# Patient Record
Sex: FEMALE | Race: WHITE | NOT HISPANIC OR LATINO | Employment: FULL TIME | URBAN - METROPOLITAN AREA
[De-identification: names, ages, dates, MRNs, and addresses within clinical notes are randomized per-mention and may not be internally consistent; named-entity substitution may affect disease eponyms.]

---

## 2017-04-19 ENCOUNTER — OFFICE VISIT (OUTPATIENT)
Dept: FAMILY MEDICINE CLINIC | Facility: CLINIC | Age: 32
End: 2017-04-19

## 2017-04-19 VITALS
DIASTOLIC BLOOD PRESSURE: 90 MMHG | OXYGEN SATURATION: 98 % | WEIGHT: 259 LBS | TEMPERATURE: 98.4 F | HEIGHT: 69 IN | HEART RATE: 117 BPM | SYSTOLIC BLOOD PRESSURE: 138 MMHG | BODY MASS INDEX: 38.36 KG/M2

## 2017-04-19 DIAGNOSIS — H81.10 BPPV (BENIGN PAROXYSMAL POSITIONAL VERTIGO), UNSPECIFIED LATERALITY: ICD-10-CM

## 2017-04-19 DIAGNOSIS — J02.9 SORE THROAT: Primary | ICD-10-CM

## 2017-04-19 LAB
EXPIRATION DATE: ABNORMAL
INTERNAL CONTROL: ABNORMAL
Lab: ABNORMAL
S PYO AG THROAT QL: POSITIVE

## 2017-04-19 PROCEDURE — 99213 OFFICE O/P EST LOW 20 MIN: CPT | Performed by: PHYSICIAN ASSISTANT

## 2017-04-19 PROCEDURE — 87880 STREP A ASSAY W/OPTIC: CPT | Performed by: PHYSICIAN ASSISTANT

## 2017-04-19 RX ORDER — MECLIZINE HYDROCHLORIDE 25 MG/1
25 TABLET ORAL 3 TIMES DAILY PRN
Qty: 15 TABLET | Refills: 0 | Status: SHIPPED | OUTPATIENT
Start: 2017-04-19 | End: 2017-10-12

## 2017-04-19 RX ORDER — LISINOPRIL 20 MG/1
1 TABLET ORAL DAILY
COMMUNITY
Start: 2017-01-16 | End: 2018-01-17 | Stop reason: SDUPTHER

## 2017-04-19 RX ORDER — AMOXICILLIN 875 MG/1
875 TABLET, COATED ORAL 2 TIMES DAILY
Qty: 20 TABLET | Refills: 0 | Status: SHIPPED | OUTPATIENT
Start: 2017-04-19 | End: 2017-10-12

## 2017-04-19 NOTE — PROGRESS NOTES
Subjective   Dianna Caicedo is a 32 y.o. female    History of Present Illness    Patient presents today complaining of sore throat since yesterday.  She states that last night she got difficult to swallow because of pain in her throat.  She has felt feverish and achy as well.     The following portions of the patient's history were reviewed and updated as appropriate: allergies, current medications, past social history and problem list    Review of Systems   Constitutional: Positive for fatigue and fever.   HENT: Positive for sore throat, trouble swallowing and voice change.    Respiratory: Negative for apnea and shortness of breath.    Skin: Negative.    Neurological: Positive for dizziness and headaches.       Objective     Vitals:    04/19/17 1111   BP: 138/90   Pulse: 117   Temp: 98.4 °F (36.9 °C)   SpO2: 98%       Physical Exam   Constitutional: She is oriented to person, place, and time. She appears well-developed and well-nourished. No distress.   HENT:   Head: Normocephalic and atraumatic.   Right Ear: Hearing and tympanic membrane normal.   Left Ear: Hearing and tympanic membrane normal.   Mouth/Throat: Posterior oropharyngeal erythema present. No oropharyngeal exudate, posterior oropharyngeal edema or tonsillar abscesses. Tonsils are 1+ on the right. Tonsils are 1+ on the left. No tonsillar exudate.   Eyes: Conjunctivae are normal. Pupils are equal, round, and reactive to light.   Neck: Normal carotid pulses present. Carotid bruit is not present.   Cardiovascular: Normal rate, regular rhythm and normal heart sounds.    Pulmonary/Chest: Effort normal and breath sounds normal. No respiratory distress.   Musculoskeletal: Normal range of motion.   Lymphadenopathy:     She has cervical adenopathy.   Neurological: She is alert and oriented to person, place, and time. She displays normal reflexes. No cranial nerve deficit. She exhibits normal muscle tone. Coordination normal.   Skin: She is not diaphoretic.    Psychiatric: She has a normal mood and affect. Her behavior is normal. Judgment and thought content normal.   Nursing note and vitals reviewed.      Assessment/Plan     Diagnoses and all orders for this visit:    Sore throat  -     POCT rapid strep A  -     amoxicillin (AMOXIL) 875 MG tablet; Take 1 tablet by mouth 2 (Two) Times a Day.    BPPV (benign paroxysmal positional vertigo), unspecified laterality  -     meclizine (ANTIVERT) 25 MG tablet; Take 1 tablet by mouth 3 (Three) Times a Day As Needed for dizziness.    Other orders  -     lisinopril (PRINIVIL,ZESTRIL) 20 MG tablet; Take 1 tablet by mouth Daily.  -     sertraline (ZOLOFT) 50 MG tablet; Take 1 tablet by mouth Daily As Needed.

## 2017-10-12 ENCOUNTER — APPOINTMENT (OUTPATIENT)
Dept: LAB | Facility: HOSPITAL | Age: 32
End: 2017-10-12

## 2017-10-12 ENCOUNTER — OFFICE VISIT (OUTPATIENT)
Dept: FAMILY MEDICINE CLINIC | Facility: CLINIC | Age: 32
End: 2017-10-12

## 2017-10-12 VITALS
RESPIRATION RATE: 14 BRPM | OXYGEN SATURATION: 100 % | TEMPERATURE: 98.4 F | SYSTOLIC BLOOD PRESSURE: 160 MMHG | BODY MASS INDEX: 41.92 KG/M2 | DIASTOLIC BLOOD PRESSURE: 110 MMHG | HEIGHT: 69 IN | WEIGHT: 283 LBS | HEART RATE: 100 BPM

## 2017-10-12 DIAGNOSIS — IMO0001 CLASS 3 OBESITY DUE TO EXCESS CALORIES WITHOUT SERIOUS COMORBIDITY WITH BODY MASS INDEX (BMI) OF 40.0 TO 44.9 IN ADULT: ICD-10-CM

## 2017-10-12 DIAGNOSIS — E03.9 HYPOTHYROIDISM, UNSPECIFIED TYPE: ICD-10-CM

## 2017-10-12 DIAGNOSIS — R60.0 LOCALIZED EDEMA: ICD-10-CM

## 2017-10-12 DIAGNOSIS — I10 ESSENTIAL HYPERTENSION: Primary | ICD-10-CM

## 2017-10-12 LAB
ANION GAP SERPL CALCULATED.3IONS-SCNC: 8 MMOL/L (ref 3–11)
BUN BLD-MCNC: 13 MG/DL (ref 9–23)
BUN/CREAT SERPL: 16.3 (ref 7–25)
CALCIUM SPEC-SCNC: 9.4 MG/DL (ref 8.7–10.4)
CHLORIDE SERPL-SCNC: 101 MMOL/L (ref 99–109)
CO2 SERPL-SCNC: 31 MMOL/L (ref 20–31)
CREAT BLD-MCNC: 0.8 MG/DL (ref 0.6–1.3)
GFR SERPL CREATININE-BSD FRML MDRD: 84 ML/MIN/1.73
GLUCOSE BLD-MCNC: 109 MG/DL (ref 70–100)
POTASSIUM BLD-SCNC: 4 MMOL/L (ref 3.5–5.5)
SODIUM BLD-SCNC: 140 MMOL/L (ref 132–146)
TSH SERPL DL<=0.05 MIU/L-ACNC: 3.32 MIU/ML (ref 0.35–5.35)

## 2017-10-12 PROCEDURE — 84443 ASSAY THYROID STIM HORMONE: CPT | Performed by: PHYSICIAN ASSISTANT

## 2017-10-12 PROCEDURE — 99214 OFFICE O/P EST MOD 30 MIN: CPT | Performed by: PHYSICIAN ASSISTANT

## 2017-10-12 PROCEDURE — 36415 COLL VENOUS BLD VENIPUNCTURE: CPT | Performed by: PHYSICIAN ASSISTANT

## 2017-10-12 PROCEDURE — 80048 BASIC METABOLIC PNL TOTAL CA: CPT | Performed by: PHYSICIAN ASSISTANT

## 2017-10-12 RX ORDER — HYDROCHLOROTHIAZIDE 25 MG/1
25 TABLET ORAL DAILY
Qty: 30 TABLET | Refills: 1 | Status: SHIPPED | OUTPATIENT
Start: 2017-10-12 | End: 2017-11-07 | Stop reason: SDUPTHER

## 2017-10-12 NOTE — PROGRESS NOTES
"Subjective   Dianna Caicedo is a 32 y.o. female    History of Present Illness  Patient comes in today concerned with swelling in her legs and ankles over the past 2 weeks.  Her weight has increased by about 25 pounds since the spring and we last saw her.  She denies any shortness of breath chest pain or headache.  Blood pressure is notably elevated today.  Patient states that she feels fine other than noticing some swelling in her ankles.  The swelling goes down at nighttime.  She states that she drinks \"her weight and water\" each day.  The following portions of the patient's history were reviewed and updated as appropriate: allergies, current medications, past social history and problem list    Review of Systems   Constitutional: Positive for unexpected weight change. Negative for fatigue.   Eyes: Negative for visual disturbance.   Respiratory: Negative.  Negative for cough, chest tightness and shortness of breath.    Cardiovascular: Positive for leg swelling. Negative for chest pain and palpitations.   Gastrointestinal: Negative for constipation, diarrhea and nausea.   Endocrine: Negative for cold intolerance and heat intolerance.   Skin: Negative for color change and rash.   Neurological: Negative for dizziness, tremors, syncope, weakness and headaches.   Psychiatric/Behavioral: Negative for agitation. The patient is not nervous/anxious.        Objective     Vitals:    10/12/17 0934   BP: (!) 160/110   Pulse: 100   Resp: 14   Temp: 98.4 °F (36.9 °C)   SpO2: 100%       Physical Exam   Constitutional: She appears well-developed and well-nourished. No distress.   HENT:   Head: Normocephalic.   No Exopthalmos   Neck: No JVD present. No thyromegaly present.   Cardiovascular: Normal rate, regular rhythm, normal heart sounds and intact distal pulses.    No murmur heard.  Pulmonary/Chest: Effort normal and breath sounds normal. No respiratory distress. She exhibits no tenderness.   Abdominal: Soft. She exhibits no " distension. There is no tenderness.   Musculoskeletal: Normal range of motion. She exhibits edema ( 1+ nonpitting ankle edema bilaterally).   Lymphadenopathy:     She has no cervical adenopathy.   Skin: Skin is warm and dry. She is not diaphoretic. No erythema. No pallor.   Psychiatric: She has a normal mood and affect.   Nursing note and vitals reviewed.      Assessment/Plan     Diagnoses and all orders for this visit:    Essential hypertension  -     hydrochlorothiazide (HYDRODIURIL) 25 MG tablet; Take 1 tablet by mouth Daily.  -     Basic Metabolic Panel    Hypothyroidism, unspecified type  -     TSH    Localized edema    Class 3 obesity due to excess calories without serious comorbidity with body mass index (BMI) of 40.0 to 44.9 in adult    Continue on lisinopril, add hydrochlorothiazide 25 mg, we'll raise to 50 mg if edema and uncontrolled hypertension persist.  Check TSH and BMP.  I will contact patient with lab results when I receive them, she'll monitor her blood pressure and her edema and follow-up if unimproved within the next 2 weeks.  Advised low calorie high-protein low-carb diet and adequate water intake daily.

## 2017-10-13 RX ORDER — LEVOTHYROXINE SODIUM 0.2 MG/1
200 TABLET ORAL DAILY
Qty: 30 TABLET | Refills: 2 | Status: SHIPPED | OUTPATIENT
Start: 2017-10-13 | End: 2019-04-24 | Stop reason: SDUPTHER

## 2017-11-01 ENCOUNTER — TELEPHONE (OUTPATIENT)
Dept: FAMILY MEDICINE CLINIC | Facility: CLINIC | Age: 32
End: 2017-11-01

## 2017-11-01 RX ORDER — AZITHROMYCIN 250 MG/1
TABLET, FILM COATED ORAL
Qty: 6 TABLET | Refills: 0 | Status: SHIPPED | OUTPATIENT
Start: 2017-11-01 | End: 2017-11-07

## 2017-11-01 NOTE — TELEPHONE ENCOUNTER
----- Message from Selin Marquez sent at 11/1/2017  9:49 AM EDT -----  Pt usually sees Dr. LYNCH    Pt called to say that she was here in the last couple weeks and now has cough, congestion and dark mucous.  She wants to know if he would call in a z pack for her?  Pt is allergic to sulfa drugs.  She uses [facility] in [place].  Thanks  And best # for pt is home # above.

## 2017-11-01 NOTE — TELEPHONE ENCOUNTER
Interesting.  I haven't seen the patient for at least 2 years but she has seen Charisse recently.  Okay for a Z-Adrian

## 2017-11-06 ENCOUNTER — TELEPHONE (OUTPATIENT)
Dept: FAMILY MEDICINE CLINIC | Facility: CLINIC | Age: 32
End: 2017-11-06

## 2017-11-06 NOTE — TELEPHONE ENCOUNTER
Last time I saw patient and her blood pressure was markedly elevated, as Dr. Lakhani and a Z-Adrian and she is are taken this and not feeling better with the combination of those 2 she needs to be seen to evaluate further.  I would be glad to work her in tomorrow at her convenience.

## 2017-11-06 NOTE — TELEPHONE ENCOUNTER
----- Message from Selin Marquez sent at 11/6/2017  1:05 PM EST -----  Pt sees Charisse (charisse wanted me to send a message)    She called to say that she took her last day of the z pack she was given this past Saturday.  She's not feeling any better and feels like it's down deep in her chest now.  She wanted to be worked in today or something stronger called in?  shes allergic to nifedepine and sulfa drugs.  She uses [facility] in [facility].  thanks

## 2017-11-07 ENCOUNTER — OFFICE VISIT (OUTPATIENT)
Dept: FAMILY MEDICINE CLINIC | Facility: CLINIC | Age: 32
End: 2017-11-07

## 2017-11-07 VITALS
HEART RATE: 82 BPM | BODY MASS INDEX: 42.36 KG/M2 | DIASTOLIC BLOOD PRESSURE: 100 MMHG | WEIGHT: 286 LBS | HEIGHT: 69 IN | OXYGEN SATURATION: 98 % | TEMPERATURE: 98.3 F | SYSTOLIC BLOOD PRESSURE: 152 MMHG

## 2017-11-07 DIAGNOSIS — I10 ESSENTIAL HYPERTENSION: ICD-10-CM

## 2017-11-07 DIAGNOSIS — J40 BRONCHITIS: Primary | ICD-10-CM

## 2017-11-07 PROCEDURE — 99214 OFFICE O/P EST MOD 30 MIN: CPT | Performed by: PHYSICIAN ASSISTANT

## 2017-11-07 RX ORDER — FLUCONAZOLE 150 MG/1
150 TABLET ORAL ONCE
Qty: 1 TABLET | Refills: 0 | Status: SHIPPED | OUTPATIENT
Start: 2017-11-07 | End: 2017-11-07

## 2017-11-07 RX ORDER — PREDNISONE 20 MG/1
20 TABLET ORAL 2 TIMES DAILY
Qty: 10 TABLET | Refills: 0 | Status: SHIPPED | OUTPATIENT
Start: 2017-11-07 | End: 2018-01-16

## 2017-11-07 RX ORDER — CEFDINIR 300 MG/1
300 CAPSULE ORAL 2 TIMES DAILY
Qty: 14 CAPSULE | Refills: 0 | Status: SHIPPED | OUTPATIENT
Start: 2017-11-07 | End: 2018-01-16

## 2017-11-07 RX ORDER — BENZONATATE 200 MG/1
200 CAPSULE ORAL 3 TIMES DAILY PRN
Qty: 21 CAPSULE | Refills: 1 | Status: SHIPPED | OUTPATIENT
Start: 2017-11-07 | End: 2018-01-16

## 2017-11-07 RX ORDER — HYDROCHLOROTHIAZIDE 25 MG/1
TABLET ORAL
Qty: 60 TABLET | Refills: 2 | Status: SHIPPED | OUTPATIENT
Start: 2017-11-07 | End: 2018-10-01 | Stop reason: SDUPTHER

## 2017-11-07 NOTE — PROGRESS NOTES
Subjective   Dianna Caicedo is a 32 y.o. female    History of Present Illness  Patient comes in today for follow-up on hypertension as well as persistent cough.  Please see previous office notes and phone messages.  Blood pressure still elevated.  Patient is tolerating hydrochlorthiazide well, she states she still having some edema although it is improved.  She denies adverse effects from it.  Regarding cough is worsened, increased chest tightness and wheezing.  The following portions of the patient's history were reviewed and updated as appropriate: allergies, current medications, past social history and problem list    Review of Systems   Constitutional: Negative for chills, fatigue, fever and unexpected weight change.   HENT: Negative.    Respiratory: Positive for wheezing. Negative for cough, chest tightness and shortness of breath.    Cardiovascular: Negative for chest pain, palpitations and leg swelling.   Gastrointestinal: Negative for nausea.   Skin: Negative for color change and rash.   Neurological: Negative for dizziness, syncope, weakness and headaches.       Objective     Vitals:    11/07/17 1424   BP: 152/100   Pulse: 82   Temp: 98.3 °F (36.8 °C)   SpO2: 98%       Physical Exam   Constitutional: She appears well-developed and well-nourished. No distress.   HENT:   Head: Normocephalic and atraumatic.   Nose: Nose normal.   Mouth/Throat: Oropharynx is clear and moist.   Neck: Neck supple. No JVD present.   Cardiovascular: Normal rate, regular rhythm, normal heart sounds and intact distal pulses.    No murmur heard.  Pulmonary/Chest: Effort normal. No stridor. No respiratory distress. She has wheezes. She exhibits no tenderness.   Abdominal: Soft. She exhibits no distension. There is no tenderness.   Musculoskeletal: She exhibits no edema.   Lymphadenopathy:     She has no cervical adenopathy.   Skin: Skin is warm and dry. She is not diaphoretic. No erythema. No pallor.   Psychiatric: She has a normal mood  and affect.   Nursing note and vitals reviewed.      Assessment/Plan     Diagnoses and all orders for this visit:    Bronchitis  -     cefdinir (OMNICEF) 300 MG capsule; Take 1 capsule by mouth 2 (Two) Times a Day.  -     fluconazole (DIFLUCAN) 150 MG tablet; Take 1 tablet by mouth 1 (One) Time for 1 dose.  -     predniSONE (DELTASONE) 20 MG tablet; Take 1 tablet by mouth 2 (Two) Times a Day.  -     benzonatate (TESSALON) 200 MG capsule; Take 1 capsule by mouth 3 (Three) Times a Day As Needed for Cough.    Essential hypertension  -     hydrochlorothiazide (HYDRODIURIL) 25 MG tablet; Take 2 daily for edema and HTN

## 2018-01-16 ENCOUNTER — APPOINTMENT (OUTPATIENT)
Dept: LAB | Facility: HOSPITAL | Age: 33
End: 2018-01-16

## 2018-01-16 ENCOUNTER — OFFICE VISIT (OUTPATIENT)
Dept: FAMILY MEDICINE CLINIC | Facility: CLINIC | Age: 33
End: 2018-01-16

## 2018-01-16 VITALS
HEIGHT: 69 IN | SYSTOLIC BLOOD PRESSURE: 142 MMHG | OXYGEN SATURATION: 99 % | HEART RATE: 122 BPM | TEMPERATURE: 98.5 F | WEIGHT: 293 LBS | DIASTOLIC BLOOD PRESSURE: 90 MMHG | BODY MASS INDEX: 43.4 KG/M2

## 2018-01-16 DIAGNOSIS — E03.9 HYPOTHYROIDISM, UNSPECIFIED TYPE: ICD-10-CM

## 2018-01-16 DIAGNOSIS — J40 BRONCHITIS: Primary | ICD-10-CM

## 2018-01-16 LAB — TSH SERPL DL<=0.05 MIU/L-ACNC: 2.09 MIU/ML (ref 0.35–5.35)

## 2018-01-16 PROCEDURE — 84443 ASSAY THYROID STIM HORMONE: CPT | Performed by: PHYSICIAN ASSISTANT

## 2018-01-16 PROCEDURE — 36415 COLL VENOUS BLD VENIPUNCTURE: CPT | Performed by: PHYSICIAN ASSISTANT

## 2018-01-16 PROCEDURE — 99213 OFFICE O/P EST LOW 20 MIN: CPT | Performed by: PHYSICIAN ASSISTANT

## 2018-01-16 RX ORDER — PREDNISONE 20 MG/1
TABLET ORAL
Qty: 10 TABLET | Refills: 0 | Status: SHIPPED | OUTPATIENT
Start: 2018-01-16 | End: 2018-03-28

## 2018-01-16 RX ORDER — AZITHROMYCIN 250 MG/1
TABLET, FILM COATED ORAL
Qty: 6 TABLET | Refills: 0 | Status: SHIPPED | OUTPATIENT
Start: 2018-01-16 | End: 2018-03-28

## 2018-01-16 NOTE — PROGRESS NOTES
Subjective   Dianna Caicedo is a 32 y.o. female  Patient comes in today for evaluation of head congestion, postnasal drainage, sore throat cough and chest congestion that have worsened since Friday.  Both her children are home with upper respiratory infections currently.  She is also due to have her TSH rechecked for hypothyroidism, on levothyroxine currently.  See previous labs.  History of Present Illness    The following portions of the patient's history were reviewed and updated as appropriate: allergies, current medications, past social history and problem list    Review of Systems   Constitutional: Negative for chills, fatigue and fever.   HENT: Positive for congestion and sore throat.    Respiratory: Positive for cough, shortness of breath and wheezing. Negative for chest tightness.    Cardiovascular: Negative for chest pain.   Gastrointestinal: Negative for nausea.   Psychiatric/Behavioral: Positive for sleep disturbance ( due to cough).       Objective     Vitals:    01/16/18 1300   BP: 142/90   Pulse: (!) 122   Temp: 98.5 °F (36.9 °C)   SpO2: 99%       Physical Exam   Constitutional: She appears well-developed and well-nourished. No distress.   Tachycardic rate in association with frequent cough   HENT:   Head: Normocephalic and atraumatic.   Nose: Nose normal.   Mouth/Throat: Oropharynx is clear and moist.   Neck: Neck supple. No JVD present. No thyromegaly present.   Cardiovascular: Regular rhythm and normal heart sounds.    No murmur heard.  Pulmonary/Chest: Effort normal. No stridor. No respiratory distress. She has wheezes. She has no rales.   Musculoskeletal: She exhibits no edema.   Lymphadenopathy:     She has no cervical adenopathy.   Skin: She is not diaphoretic.   Nursing note and vitals reviewed.      Assessment/Plan     Diagnoses and all orders for this visit:    Bronchitis  -     azithromycin (ZITHROMAX Z-AMANDA) 250 MG tablet; Take 2 tablets the first day, then 1 tablet daily for 4 days.  -      predniSONE (DELTASONE) 20 MG tablet; Take one twice daily for bronchitis    Hypothyroidism, unspecified type  -     TSH

## 2018-01-17 RX ORDER — LISINOPRIL 20 MG/1
20 TABLET ORAL DAILY
Qty: 30 TABLET | Refills: 5 | Status: SHIPPED | OUTPATIENT
Start: 2018-01-17 | End: 2018-03-28 | Stop reason: ALTCHOICE

## 2018-03-28 ENCOUNTER — OFFICE VISIT (OUTPATIENT)
Dept: FAMILY MEDICINE CLINIC | Facility: CLINIC | Age: 33
End: 2018-03-28

## 2018-03-28 VITALS
TEMPERATURE: 98.4 F | OXYGEN SATURATION: 99 % | DIASTOLIC BLOOD PRESSURE: 96 MMHG | SYSTOLIC BLOOD PRESSURE: 148 MMHG | HEIGHT: 69 IN | HEART RATE: 103 BPM | WEIGHT: 289 LBS | BODY MASS INDEX: 42.8 KG/M2

## 2018-03-28 DIAGNOSIS — IMO0001 CLASS 3 OBESITY DUE TO EXCESS CALORIES WITHOUT SERIOUS COMORBIDITY WITH BODY MASS INDEX (BMI) OF 40.0 TO 44.9 IN ADULT: ICD-10-CM

## 2018-03-28 DIAGNOSIS — R06.81 APNEA SPELL: ICD-10-CM

## 2018-03-28 DIAGNOSIS — I10 ESSENTIAL HYPERTENSION: Primary | ICD-10-CM

## 2018-03-28 PROCEDURE — 99213 OFFICE O/P EST LOW 20 MIN: CPT | Performed by: PHYSICIAN ASSISTANT

## 2018-03-28 RX ORDER — VALSARTAN 320 MG/1
320 TABLET ORAL DAILY
Qty: 30 TABLET | Refills: 1 | Status: SHIPPED | OUTPATIENT
Start: 2018-03-28 | End: 2019-01-28

## 2018-03-28 NOTE — PROGRESS NOTES
Subjective   Dianna Caicedo is a 33 y.o. female  Hypertension (Follow up on blood pressure, seen at [facility] on monday)      History of Present Illness  Patient comes today for follow-up on hypertension, she states her blood pressures fluctuated on off through the years seems like medications were 4 on stop working.  She's currently on 15 oh grams of HCTZ with 20 mg of lisinopril, blood pressure shot up last week as high as 199/156 that she went to the ER.  She states that they gave her a prescription to fill for Avapro and clonidine and chlorthalidone and suggested that she switch all of her medications.  She states she wasn't comfortable with this and she wanted to check with me.  No family history for hypertension.  She states several people that they have on medication with recently have commented that she sounds like she stops breathing in the middle the night and so she feels that she needs sleep apnea evaluation.  The following portions of the patient's history were reviewed and updated as appropriate: allergies, current medications, past social history and problem list    Review of Systems   Constitutional: Negative for fatigue and unexpected weight change.   Respiratory: Positive for apnea. Negative for cough, chest tightness and shortness of breath.    Cardiovascular: Negative for chest pain, palpitations and leg swelling.   Gastrointestinal: Negative for nausea.   Skin: Negative for color change and rash.   Neurological: Negative for dizziness, syncope, weakness and headaches.       Objective     Vitals:    03/28/18 1338   BP: 148/96   Pulse: 103   Temp: 98.4 °F (36.9 °C)   SpO2: 99%       Physical Exam   Constitutional: She appears well-developed and well-nourished. No distress.   Obesity noted     Neck: No JVD present. No thyromegaly present.   Cardiovascular: Normal rate, regular rhythm, normal heart sounds and intact distal pulses.    No murmur heard.  Pulmonary/Chest: Effort normal and breath  sounds normal. No respiratory distress. She exhibits no tenderness.   Abdominal: Soft. She exhibits no distension. There is no tenderness.   Musculoskeletal: She exhibits no edema.   Skin: Skin is warm and dry. She is not diaphoretic. No erythema. No pallor.   Psychiatric: She has a normal mood and affect. Her behavior is normal. Judgment and thought content normal.   Nursing note and vitals reviewed.      Assessment/Plan     Diagnoses and all orders for this visit:    Essential hypertension  -     valsartan (DIOVAN) 320 MG tablet; Take 1 tablet by mouth Daily.    Apnea spell  -     Ambulatory Referral to Sleep Lab    Class 3 obesity due to excess calories without serious comorbidity with body mass index (BMI) of 40.0 to 44.9 in adult    Advised patient to continue on HCTZ 50 mg daily but discontinue lisinopril, start on Diovan 320, monitor blood pressure and follow-up in 4 weeks for recheck.  If blood pressure continues to stay elevated will consider adding on metoprolol.

## 2018-04-05 ENCOUNTER — APPOINTMENT (OUTPATIENT)
Dept: LAB | Facility: HOSPITAL | Age: 33
End: 2018-04-05

## 2018-04-05 ENCOUNTER — OFFICE VISIT (OUTPATIENT)
Dept: FAMILY MEDICINE CLINIC | Facility: CLINIC | Age: 33
End: 2018-04-05

## 2018-04-05 VITALS
BODY MASS INDEX: 43.35 KG/M2 | OXYGEN SATURATION: 98 % | HEART RATE: 127 BPM | HEIGHT: 68 IN | TEMPERATURE: 101.9 F | WEIGHT: 286 LBS | DIASTOLIC BLOOD PRESSURE: 110 MMHG | SYSTOLIC BLOOD PRESSURE: 160 MMHG

## 2018-04-05 DIAGNOSIS — I10 ESSENTIAL HYPERTENSION: ICD-10-CM

## 2018-04-05 DIAGNOSIS — R50.9 ACUTE FEBRILE ILLNESS: Primary | ICD-10-CM

## 2018-04-05 DIAGNOSIS — J02.9 SORE THROAT: ICD-10-CM

## 2018-04-05 DIAGNOSIS — R52 BODY ACHES: ICD-10-CM

## 2018-04-05 LAB
BASOPHILS # BLD AUTO: 0.03 10*3/MM3 (ref 0–0.2)
BASOPHILS NFR BLD AUTO: 0.2 % (ref 0–1)
DEPRECATED RDW RBC AUTO: 38.7 FL (ref 37–54)
EOSINOPHIL # BLD AUTO: 0 10*3/MM3 (ref 0–0.3)
EOSINOPHIL NFR BLD AUTO: 0 % (ref 0–3)
ERYTHROCYTE [DISTWIDTH] IN BLOOD BY AUTOMATED COUNT: 12.8 % (ref 11.3–14.5)
EXPIRATION DATE: NORMAL
EXPIRATION DATE: NORMAL
FLUAV AG NPH QL: NEGATIVE
FLUBV AG NPH QL: NEGATIVE
HCT VFR BLD AUTO: 42.9 % (ref 34.5–44)
HGB BLD-MCNC: 14.8 G/DL (ref 11.5–15.5)
IMM GRANULOCYTES # BLD: 0.05 10*3/MM3 (ref 0–0.03)
IMM GRANULOCYTES NFR BLD: 0.3 % (ref 0–0.6)
INTERNAL CONTROL: NORMAL
INTERNAL CONTROL: NORMAL
LYMPHOCYTES # BLD AUTO: 2.56 10*3/MM3 (ref 0.6–4.8)
LYMPHOCYTES NFR BLD AUTO: 14 % (ref 24–44)
Lab: NORMAL
Lab: NORMAL
MCH RBC QN AUTO: 28.6 PG (ref 27–31)
MCHC RBC AUTO-ENTMCNC: 34.5 G/DL (ref 32–36)
MCV RBC AUTO: 83 FL (ref 80–99)
MONOCYTES # BLD AUTO: 1.16 10*3/MM3 (ref 0–1)
MONOCYTES NFR BLD AUTO: 6.3 % (ref 0–12)
NEUTROPHILS # BLD AUTO: 14.51 10*3/MM3 (ref 1.5–8.3)
NEUTROPHILS NFR BLD AUTO: 79.2 % (ref 41–71)
PLATELET # BLD AUTO: 306 10*3/MM3 (ref 150–450)
PMV BLD AUTO: 9.4 FL (ref 6–12)
RBC # BLD AUTO: 5.17 10*6/MM3 (ref 3.89–5.14)
S PYO AG THROAT QL: NEGATIVE
WBC NRBC COR # BLD: 18.31 10*3/MM3 (ref 3.5–10.8)

## 2018-04-05 PROCEDURE — 87880 STREP A ASSAY W/OPTIC: CPT | Performed by: FAMILY MEDICINE

## 2018-04-05 PROCEDURE — 85025 COMPLETE CBC W/AUTO DIFF WBC: CPT | Performed by: FAMILY MEDICINE

## 2018-04-05 PROCEDURE — 87804 INFLUENZA ASSAY W/OPTIC: CPT | Performed by: FAMILY MEDICINE

## 2018-04-05 PROCEDURE — 96372 THER/PROPH/DIAG INJ SC/IM: CPT | Performed by: FAMILY MEDICINE

## 2018-04-05 PROCEDURE — 99213 OFFICE O/P EST LOW 20 MIN: CPT | Performed by: FAMILY MEDICINE

## 2018-04-05 PROCEDURE — 36415 COLL VENOUS BLD VENIPUNCTURE: CPT | Performed by: FAMILY MEDICINE

## 2018-04-05 RX ORDER — CEFTRIAXONE 500 MG/1
500 INJECTION, POWDER, FOR SOLUTION INTRAMUSCULAR; INTRAVENOUS ONCE
Status: COMPLETED | OUTPATIENT
Start: 2018-04-05 | End: 2018-04-05

## 2018-04-05 RX ORDER — PREDNISONE 20 MG/1
20 TABLET ORAL 2 TIMES DAILY
Qty: 10 TABLET | Refills: 0 | Status: SHIPPED | OUTPATIENT
Start: 2018-04-05 | End: 2018-07-13

## 2018-04-05 RX ADMIN — CEFTRIAXONE 500 MG: 500 INJECTION, POWDER, FOR SOLUTION INTRAMUSCULAR; INTRAVENOUS at 16:39

## 2018-04-05 NOTE — PROGRESS NOTES
Subjective   Dianna Caicedo is a 33 y.o. female    Patient presents with 5 day history of fever, sore throat, chills, myalgias, earache, nasal congestion, sinus pain and pressure, generalized headache and loss of appetite.  Seen at urgent care on Monday with negative flu and strep screens.  Symptoms have persisted all week.  She is trying ibuprofen which will help her fever for about an hour.  Waking up at night with sweats.  Blood pressure has been elevated.  Urine output decreased.  Not eating or drinking much.      Fever    Associated symptoms include congestion, coughing, ear pain, headaches and a sore throat. Pertinent negatives include no chest pain, diarrhea, nausea or vomiting.   Sore Throat    Associated symptoms include congestion, coughing, ear pain, headaches and neck pain. Pertinent negatives include no diarrhea, shortness of breath or vomiting.   Earache    Associated symptoms include coughing, headaches, neck pain, rhinorrhea and a sore throat. Pertinent negatives include no diarrhea or vomiting.   Headache    Associated symptoms include coughing, ear pain, eye pain, a fever, neck pain, rhinorrhea, sinus pressure and a sore throat. Pertinent negatives include no dizziness, nausea or vomiting.       The following portions of the patient's history were reviewed and updated as appropriate: allergies, current medications, past social history and problem list    Review of Systems   Constitutional: Positive for chills and fever. Negative for fatigue.   HENT: Positive for congestion, ear pain, postnasal drip, rhinorrhea, sinus pressure and sore throat.    Eyes: Positive for pain.   Respiratory: Positive for cough. Negative for shortness of breath.    Cardiovascular: Negative for chest pain, palpitations and leg swelling.   Gastrointestinal: Negative for diarrhea, nausea and vomiting.   Endocrine: Negative for polydipsia, polyphagia and polyuria.   Genitourinary: Negative.    Musculoskeletal: Positive for  arthralgias, myalgias, neck pain and neck stiffness.   Neurological: Positive for headaches. Negative for dizziness.   Hematological: Positive for adenopathy. Does not bruise/bleed easily.   Psychiatric/Behavioral: Negative for dysphoric mood. The patient is not nervous/anxious.        Objective     Vitals:    04/05/18 1526   BP: (!) 160/110   Pulse: (!) 127   Temp: (!) 101.9 °F (38.8 °C)   SpO2: 98%       Physical Exam   Constitutional: She appears well-developed and well-nourished. She appears ill.   HENT:   Head: Normocephalic and atraumatic.   Right Ear: Tympanic membrane and ear canal normal.   Left Ear: Tympanic membrane and ear canal normal.   Nose: Mucosal edema, rhinorrhea and sinus tenderness present. Right sinus exhibits maxillary sinus tenderness and frontal sinus tenderness. Left sinus exhibits maxillary sinus tenderness and frontal sinus tenderness.   Mouth/Throat: Posterior oropharyngeal edema and posterior oropharyngeal erythema present. No oropharyngeal exudate.   Eyes: Conjunctivae are normal. Pupils are equal, round, and reactive to light. No scleral icterus.   Neck: Muscular tenderness present.   Cardiovascular: Normal rate and regular rhythm.    Pulmonary/Chest: Effort normal and breath sounds normal.   Lymphadenopathy:     She has cervical adenopathy.   Nursing note and vitals reviewed.      Assessment/Plan   Problem List Items Addressed This Visit     None      Visit Diagnoses     Acute febrile illness    -  Primary    Relevant Medications    cefTRIAXone (ROCEPHIN) injection 500 mg (Completed)    Other Relevant Orders    POC Rapid Strep A (Completed)    POC Influenza A / B (Completed)    CBC w AUTO Differential (Completed)    POC Infectious Mononucleosis Antibody    CBC Auto Differential (Completed)    Sore throat        Relevant Medications    predniSONE (DELTASONE) 20 MG tablet    cefTRIAXone (ROCEPHIN) injection 500 mg (Completed)    Other Relevant Orders    POC Rapid Strep A (Completed)     POC Influenza A / B (Completed)    CBC w AUTO Differential (Completed)    POC Infectious Mononucleosis Antibody    CBC Auto Differential (Completed)    Body aches        Relevant Medications    predniSONE (DELTASONE) 20 MG tablet    cefTRIAXone (ROCEPHIN) injection 500 mg (Completed)    Other Relevant Orders    POC Rapid Strep A (Completed)    POC Influenza A / B (Completed)    Essential hypertension            Rocephin 500 mg IM today  Follow up in 24 hours.

## 2018-04-06 ENCOUNTER — TRANSCRIBE ORDERS (OUTPATIENT)
Dept: LAB | Facility: HOSPITAL | Age: 33
End: 2018-04-06

## 2018-04-06 ENCOUNTER — OFFICE VISIT (OUTPATIENT)
Dept: FAMILY MEDICINE CLINIC | Facility: CLINIC | Age: 33
End: 2018-04-06

## 2018-04-06 ENCOUNTER — LAB (OUTPATIENT)
Dept: LAB | Facility: HOSPITAL | Age: 33
End: 2018-04-06

## 2018-04-06 VITALS
WEIGHT: 286 LBS | OXYGEN SATURATION: 98 % | SYSTOLIC BLOOD PRESSURE: 172 MMHG | DIASTOLIC BLOOD PRESSURE: 120 MMHG | TEMPERATURE: 98 F | BODY MASS INDEX: 43.35 KG/M2 | HEART RATE: 113 BPM | HEIGHT: 68 IN

## 2018-04-06 DIAGNOSIS — J02.9 ACUTE PHARYNGITIS, UNSPECIFIED ETIOLOGY: ICD-10-CM

## 2018-04-06 DIAGNOSIS — J02.9 ACUTE PHARYNGITIS, UNSPECIFIED ETIOLOGY: Primary | ICD-10-CM

## 2018-04-06 DIAGNOSIS — R50.9 ACUTE FEBRILE ILLNESS: Primary | ICD-10-CM

## 2018-04-06 DIAGNOSIS — I10 ESSENTIAL HYPERTENSION: ICD-10-CM

## 2018-04-06 DIAGNOSIS — G47.19 EXCESSIVE DAYTIME SLEEPINESS: ICD-10-CM

## 2018-04-06 DIAGNOSIS — R50.9 HYPERTHERMIA-INDUCED DEFECT: ICD-10-CM

## 2018-04-06 LAB
ALBUMIN SERPL-MCNC: 4.4 G/DL (ref 3.2–4.8)
ALBUMIN/GLOB SERPL: 1.8 G/DL (ref 1.5–2.5)
ALP SERPL-CCNC: 67 U/L (ref 25–100)
ALT SERPL W P-5'-P-CCNC: 18 U/L (ref 7–40)
ANION GAP SERPL CALCULATED.3IONS-SCNC: 9 MMOL/L (ref 3–11)
AST SERPL-CCNC: 16 U/L (ref 0–33)
BILIRUB SERPL-MCNC: 0.4 MG/DL (ref 0.3–1.2)
BUN BLD-MCNC: 11 MG/DL (ref 9–23)
BUN/CREAT SERPL: 15.7 (ref 7–25)
CALCIUM SPEC-SCNC: 9.2 MG/DL (ref 8.7–10.4)
CHLORIDE SERPL-SCNC: 104 MMOL/L (ref 99–109)
CO2 SERPL-SCNC: 29 MMOL/L (ref 20–31)
CREAT BLD-MCNC: 0.7 MG/DL (ref 0.6–1.3)
GFR SERPL CREATININE-BSD FRML MDRD: 98 ML/MIN/1.73
GLOBULIN UR ELPH-MCNC: 2.5 GM/DL
GLUCOSE BLD-MCNC: 105 MG/DL (ref 70–100)
POTASSIUM BLD-SCNC: 3.7 MMOL/L (ref 3.5–5.5)
PROT SERPL-MCNC: 6.9 G/DL (ref 5.7–8.2)
SODIUM BLD-SCNC: 142 MMOL/L (ref 132–146)

## 2018-04-06 PROCEDURE — 99214 OFFICE O/P EST MOD 30 MIN: CPT | Performed by: FAMILY MEDICINE

## 2018-04-06 PROCEDURE — 80053 COMPREHEN METABOLIC PANEL: CPT

## 2018-04-06 RX ORDER — LABETALOL 100 MG/1
100 TABLET, FILM COATED ORAL 2 TIMES DAILY
Qty: 60 TABLET | Refills: 5 | Status: SHIPPED | OUTPATIENT
Start: 2018-04-06 | End: 2018-07-13

## 2018-04-06 RX ORDER — CEFDINIR 300 MG/1
300 CAPSULE ORAL 2 TIMES DAILY
Qty: 20 CAPSULE | Refills: 0 | Status: SHIPPED | OUTPATIENT
Start: 2018-04-06 | End: 2018-07-13

## 2018-04-06 NOTE — PROGRESS NOTES
Subjective   Dianna Caicedo is a 33 y.o. female    24-hour follow-up for febrile illness.  Fever has improved.  Throat still sore.  Neck still sore.  Sinuses less congested.  Still complains of myalgias and arthralgias.  Blood pressure still quite elevated.  Lab work significant for elevated white count with left shift.  CMP was normal with normal kidney function and normal liver function.  Mono test still pending?  Patient's blood pressure remains markedly elevated which is concerning.  She has long-standing issues with hypertension. Also frustrated with sleep apnea referral not until August. Has multiple symptoms including daytime sleepiness.      Fever    Associated symptoms include congestion, coughing, ear pain, headaches and a sore throat. Pertinent negatives include no chest pain, diarrhea, nausea or vomiting.       The following portions of the patient's history were reviewed and updated as appropriate: allergies, current medications, past social history and problem list    Review of Systems   Constitutional: Positive for chills and fever. Negative for fatigue.   HENT: Positive for congestion, ear pain, postnasal drip, rhinorrhea, sinus pressure and sore throat.    Eyes: Positive for pain.   Respiratory: Positive for cough. Negative for shortness of breath.    Cardiovascular: Negative for chest pain, palpitations and leg swelling.   Gastrointestinal: Negative for diarrhea, nausea and vomiting.   Endocrine: Negative for polydipsia, polyphagia and polyuria.   Genitourinary: Negative.    Musculoskeletal: Positive for arthralgias, myalgias, neck pain and neck stiffness.   Neurological: Positive for headaches. Negative for dizziness.   Hematological: Positive for adenopathy. Does not bruise/bleed easily.   Psychiatric/Behavioral: Negative for dysphoric mood. The patient is not nervous/anxious.        Objective     Vitals:    04/06/18 1522   BP: (!) 172/120   Pulse: 113   Temp: 98 °F (36.7 °C)   SpO2: 98%        Physical Exam   Constitutional: She appears well-developed and well-nourished.   HENT:   Head: Normocephalic and atraumatic.   Right Ear: Tympanic membrane and ear canal normal.   Left Ear: Tympanic membrane and ear canal normal.   Nose: Mucosal edema, rhinorrhea and sinus tenderness present. Right sinus exhibits maxillary sinus tenderness and frontal sinus tenderness. Left sinus exhibits maxillary sinus tenderness and frontal sinus tenderness.   Mouth/Throat: Posterior oropharyngeal edema and posterior oropharyngeal erythema present. No oropharyngeal exudate.   Eyes: Conjunctivae are normal. Pupils are equal, round, and reactive to light. No scleral icterus.   Neck: Muscular tenderness present.   Cardiovascular: Normal rate and regular rhythm.    Pulmonary/Chest: Effort normal and breath sounds normal.   Lymphadenopathy:     She has cervical adenopathy.   Nursing note and vitals reviewed.      Assessment/Plan   Problem List Items Addressed This Visit     None      Visit Diagnoses     Acute febrile illness    -  Primary    Relevant Medications    cefdinir (OMNICEF) 300 MG capsule    Acute pharyngitis, unspecified etiology        Relevant Medications    cefdinir (OMNICEF) 300 MG capsule    Excessive daytime sleepiness        Relevant Orders    Home Sleep Study    Essential hypertension        Relevant Medications    labetalol (NORMODYNE) 100 MG tablet

## 2018-04-13 ENCOUNTER — TELEPHONE (OUTPATIENT)
Dept: FAMILY MEDICINE CLINIC | Facility: CLINIC | Age: 33
End: 2018-04-13

## 2018-04-13 NOTE — TELEPHONE ENCOUNTER
No.  Since she just started having the symptoms and we just started the labetalol that is the most likely culprit, but her symptoms should resolve in a few days.

## 2018-04-13 NOTE — TELEPHONE ENCOUNTER
Yes it could be due to the medication.  I am just very concerned about her blood pressure being so high.  Symptoms could be related to her high blood pressure also.  See how she feels off the medicine.

## 2018-04-13 NOTE — TELEPHONE ENCOUNTER
Patient states she is having some increased sweating, dizziness, light headedness, feels like she could pass out at any moment. Stopped labetalol because makes symptoms worse. These symptoms started on Monday. She is concerned that medication is causing this. Please advise

## 2018-04-13 NOTE — TELEPHONE ENCOUNTER
Patient already stopped the labetalol and still having symptoms, do you want her to stop all bp meds?   ( valsartan 320mg and HCTZ 25mg)

## 2018-04-13 NOTE — TELEPHONE ENCOUNTER
"----- Message from Madeleine Reese sent at 4/13/2018  1:17 PM EDT -----  Contact: PT.  Pt. SAW Dr. GANT RECENTLY.  Pt. IS HAVING \"DIFFERENT SYMPTOMS\" CAN THIS BE DUE TO MEDS. OR UNRELATED?   RX=DANIELLE/BETHANY HOPPER.  Pt. CAN BE REACHED @ [facility].  "

## 2018-04-16 ENCOUNTER — TELEPHONE (OUTPATIENT)
Dept: FAMILY MEDICINE CLINIC | Facility: CLINIC | Age: 33
End: 2018-04-16

## 2018-04-16 ENCOUNTER — LAB (OUTPATIENT)
Dept: LAB | Facility: HOSPITAL | Age: 33
End: 2018-04-16

## 2018-04-16 DIAGNOSIS — R50.9 FEBRILE ILLNESS, ACUTE: Primary | ICD-10-CM

## 2018-04-16 DIAGNOSIS — R50.9 FEBRILE ILLNESS, ACUTE: ICD-10-CM

## 2018-04-16 LAB — HETEROPH AB SER QL LA: NEGATIVE

## 2018-04-16 PROCEDURE — 86308 HETEROPHILE ANTIBODY SCREEN: CPT

## 2018-04-16 PROCEDURE — 36415 COLL VENOUS BLD VENIPUNCTURE: CPT

## 2018-04-16 NOTE — TELEPHONE ENCOUNTER
Spoke to lab, they state the order was put under POC and not a regular order for Mono test so it was not collected. Please advise

## 2018-04-16 NOTE — TELEPHONE ENCOUNTER
Spoke to patient, I already spoke to her on Friday. She is reporting a new problem.    As of today, Patient states she is now having widespread joint pain, feeling hot, some dizziness (but not as bad as Friday). Please advise

## 2018-04-16 NOTE — TELEPHONE ENCOUNTER
Likely related to recent febrile illness.  Recommend continued NSAID therapy on an as-needed basis.  Follow-up if not improved

## 2018-04-16 NOTE — TELEPHONE ENCOUNTER
----- Message from Kylee Guerrero sent at 4/16/2018 11:36 AM EDT -----  Contact: PATIENT  SAID SHE HAS BEEN WAITING FOR A CALL FROM BACK LAST Friday, SHE SENT A MESSAGE  THROUGH THE PORTAL AND HAS NOT GOT AN ANSWER. PLEASE CALL HER BACK [phone]

## 2018-04-16 NOTE — TELEPHONE ENCOUNTER
Patient wants to have this done today, she will be going to the [facility] location

## 2018-05-01 ENCOUNTER — TRANSCRIBE ORDERS (OUTPATIENT)
Dept: PULMONOLOGY | Facility: HOSPITAL | Age: 33
End: 2018-05-01

## 2018-05-01 DIAGNOSIS — G47.33 OBSTRUCTIVE SLEEP APNEA: Primary | ICD-10-CM

## 2018-05-01 DIAGNOSIS — R06.83 SNORING: ICD-10-CM

## 2018-05-01 DIAGNOSIS — G47.10 HYPERSOMNIA: ICD-10-CM

## 2018-05-14 ENCOUNTER — HOSPITAL ENCOUNTER (OUTPATIENT)
Dept: SLEEP MEDICINE | Facility: HOSPITAL | Age: 33
Discharge: HOME | End: 2018-05-14
Admitting: INTERNAL MEDICINE

## 2018-05-14 VITALS
DIASTOLIC BLOOD PRESSURE: 110 MMHG | HEART RATE: 111 BPM | SYSTOLIC BLOOD PRESSURE: 196 MMHG | BODY MASS INDEX: 43.97 KG/M2 | WEIGHT: 290.13 LBS | HEIGHT: 68 IN | OXYGEN SATURATION: 96 %

## 2018-05-14 DIAGNOSIS — R06.83 SNORING: ICD-10-CM

## 2018-05-14 DIAGNOSIS — G47.10 HYPERSOMNIA: ICD-10-CM

## 2018-05-14 DIAGNOSIS — G47.33 OBSTRUCTIVE SLEEP APNEA: ICD-10-CM

## 2018-05-14 PROCEDURE — 95811 POLYSOM 6/>YRS CPAP 4/> PARM: CPT

## 2018-07-12 ENCOUNTER — TELEPHONE (OUTPATIENT)
Dept: FAMILY MEDICINE CLINIC | Facility: CLINIC | Age: 33
End: 2018-07-12

## 2018-07-12 NOTE — TELEPHONE ENCOUNTER
Patient needs to be seen and evaluated this sounds like she could have some sort of infection.  I would be glad to work her in tomorrow.  If I don't have any openings have her come in at either 8 in the morning or 1 tomorrow.

## 2018-07-12 NOTE — TELEPHONE ENCOUNTER
----- Message from Madeleine Reese sent at 7/12/2018  1:35 PM EDT -----  Contact: PT.  PT. LUCIUS CASTILLO.  PT. HAS A ? FOR THE MA.  CONTACT PT. @ [facility]. (RE. OTC MED'S.).

## 2018-07-13 ENCOUNTER — OFFICE VISIT (OUTPATIENT)
Dept: FAMILY MEDICINE CLINIC | Facility: CLINIC | Age: 33
End: 2018-07-13

## 2018-07-13 VITALS
HEART RATE: 91 BPM | OXYGEN SATURATION: 99 % | TEMPERATURE: 98.2 F | WEIGHT: 282 LBS | DIASTOLIC BLOOD PRESSURE: 74 MMHG | HEIGHT: 68 IN | BODY MASS INDEX: 42.74 KG/M2 | SYSTOLIC BLOOD PRESSURE: 152 MMHG

## 2018-07-13 DIAGNOSIS — F41.9 ANXIETY: ICD-10-CM

## 2018-07-13 DIAGNOSIS — R19.7 DIARRHEA OF PRESUMED INFECTIOUS ORIGIN: Primary | ICD-10-CM

## 2018-07-13 PROCEDURE — 99214 OFFICE O/P EST MOD 30 MIN: CPT | Performed by: PHYSICIAN ASSISTANT

## 2018-07-13 RX ORDER — METRONIDAZOLE 500 MG/1
500 TABLET ORAL 3 TIMES DAILY
Qty: 15 TABLET | Refills: 0 | Status: SHIPPED | OUTPATIENT
Start: 2018-07-13 | End: 2018-09-04

## 2018-07-13 RX ORDER — CIPROFLOXACIN 500 MG/1
500 TABLET, FILM COATED ORAL 2 TIMES DAILY
Qty: 10 TABLET | Refills: 0 | Status: SHIPPED | OUTPATIENT
Start: 2018-07-13 | End: 2018-09-04

## 2018-07-13 RX ORDER — DICYCLOMINE HCL 20 MG
20 TABLET ORAL EVERY 6 HOURS
Qty: 20 TABLET | Refills: 0 | Status: SHIPPED | OUTPATIENT
Start: 2018-07-13 | End: 2019-01-28

## 2018-07-13 NOTE — PROGRESS NOTES
Subjective   Dianna Caicedo is a 33 y.o. female  Diarrhea (Diarrhea x5 days) and Nausea (Nausea with loss of appetitie )      History of Present Illness   Patient comes in today for evaluation of 2 separate problems that are uncontrolled.  One is of diarrhea for the past 4 days.  States she's been having 8-10 episodes of liquid stool per day.  She states she's having symptoms in the night, unrelated to eating.  She feels a lot of rumbling sensations in her abdomen with increased gaseousness and bloating.  A lot of nausea but no vomiting.  No foreign travel, no travel at all, [place] the [place], no family members are ill.  No fever but she states she has felt chilled.  She has tried Imodium without any relief.  She states that today she is having had 4 episodes of diarrhea even after taking Imodium.  Second issue is of uncontrolled symptoms of emotional depression and anxiety.  She states her symptoms have worsened over the course of the past 3 months.  No homicidal or suicidal ideations.  She has history of postpartum depression in the past couple of years ago and took Zoloft then it did well but states that she was doing fine off of that for the past year and a half.  She states she has less stress at home and at work.  The following portions of the patient's history were reviewed and updated as appropriate: allergies, current medications, past social history and problem list    Review of Systems   Constitutional: Positive for appetite change. Negative for chills, fatigue, fever and unexpected weight change.   Respiratory: Negative for cough, chest tightness and shortness of breath.    Cardiovascular: Negative for chest pain.   Gastrointestinal: Positive for abdominal distention, abdominal pain and nausea. Negative for anal bleeding, blood in stool, constipation, diarrhea, rectal pain and vomiting.   Endocrine: Negative.    Genitourinary: Negative for difficulty urinating, dysuria, flank pain, hematuria and  urgency.   Musculoskeletal: Negative for back pain.   Skin: Negative for color change and rash.   Allergic/Immunologic: Negative for food allergies and immunocompromised state.   Neurological: Negative for dizziness, tremors, weakness, light-headedness and headaches.   Psychiatric/Behavioral: Positive for dysphoric mood. Negative for agitation, behavioral problems, confusion, decreased concentration, sleep disturbance and suicidal ideas. The patient is nervous/anxious.        Objective     Vitals:    07/13/18 1323   BP: 152/74   Pulse: 91   Temp: 98.2 °F (36.8 °C)   SpO2: 99%       Physical Exam   Constitutional: She is oriented to person, place, and time. She appears well-developed and well-nourished. She is active.  Non-toxic appearance. She does not have a sickly appearance. She does not appear ill. No distress.   HENT:   Mouth/Throat: Oropharynx is clear and moist.   Eyes: Conjunctivae are normal. No scleral icterus.   Neck: No thyroid mass and no thyromegaly present.   Cardiovascular: Normal rate, regular rhythm and normal heart sounds.    Pulmonary/Chest: Effort normal and breath sounds normal.   Abdominal: Soft. Bowel sounds are normal. She exhibits no distension and no mass. There is no tenderness. There is no rebound and no guarding. No hernia.   Lymphadenopathy:     She has no cervical adenopathy.   Neurological: She is alert and oriented to person, place, and time.   Skin: Skin is warm and dry. No rash noted. She is not diaphoretic. No erythema. No pallor.   Psychiatric: Her speech is normal and behavior is normal. Judgment and thought content normal. Her mood appears anxious. Her affect is not angry and not inappropriate. Cognition and memory are normal. She exhibits a depressed mood. She is attentive.   Nursing note and vitals reviewed.      Assessment/Plan     Diagnoses and all orders for this visit:    Diarrhea of presumed infectious origin  -     metroNIDAZOLE (FLAGYL) 500 MG tablet; Take 1 tablet by  mouth 3 (Three) Times a Day.  -     ciprofloxacin (CIPRO) 500 MG tablet; Take 1 tablet by mouth 2 (Two) Times a Day. Take 1 twice daily  -     dicyclomine (BENTYL) 20 MG tablet; Take 1 tablet by mouth Every 6 (Six) Hours.    Anxiety  -     sertraline (ZOLOFT) 50 MG tablet; Take 1 tablet by mouth Daily.    Recommended bland diet, follow-up in office for further evaluation after the weekend if symptoms of diarrhea persist would recommend labs and stool studies at that time.  Follow-up in one month for recheck of anxiety, advised patient to wait until after diarrhea clears before starting Zoloft.

## 2018-07-18 ENCOUNTER — TELEPHONE (OUTPATIENT)
Dept: FAMILY MEDICINE CLINIC | Facility: CLINIC | Age: 33
End: 2018-07-18

## 2018-07-18 ENCOUNTER — LAB (OUTPATIENT)
Dept: LAB | Facility: HOSPITAL | Age: 33
End: 2018-07-18

## 2018-07-18 DIAGNOSIS — R19.7 DIARRHEA, UNSPECIFIED TYPE: ICD-10-CM

## 2018-07-18 DIAGNOSIS — R19.7 DIARRHEA, UNSPECIFIED TYPE: Primary | ICD-10-CM

## 2018-07-18 LAB
ALBUMIN SERPL-MCNC: 3.94 G/DL (ref 3.2–4.8)
ALBUMIN/GLOB SERPL: 1.9 G/DL (ref 1.5–2.5)
ALP SERPL-CCNC: 48 U/L (ref 25–100)
ALT SERPL W P-5'-P-CCNC: 101 U/L (ref 7–40)
ANION GAP SERPL CALCULATED.3IONS-SCNC: 7 MMOL/L (ref 3–11)
AST SERPL-CCNC: 71 U/L (ref 0–33)
BASOPHILS # BLD AUTO: 0.03 10*3/MM3 (ref 0–0.2)
BASOPHILS NFR BLD AUTO: 0.5 % (ref 0–1)
BILIRUB SERPL-MCNC: 0.5 MG/DL (ref 0.3–1.2)
BUN BLD-MCNC: 9 MG/DL (ref 9–23)
BUN/CREAT SERPL: 11.8 (ref 7–25)
C DIFF TOX GENS STL QL NAA+PROBE: NOT DETECTED
CALCIUM SPEC-SCNC: 8.7 MG/DL (ref 8.7–10.4)
CHLORIDE SERPL-SCNC: 104 MMOL/L (ref 99–109)
CO2 SERPL-SCNC: 29 MMOL/L (ref 20–31)
CREAT BLD-MCNC: 0.76 MG/DL (ref 0.6–1.3)
DEPRECATED RDW RBC AUTO: 41 FL (ref 37–54)
EOSINOPHIL # BLD AUTO: 0 10*3/MM3 (ref 0–0.3)
EOSINOPHIL NFR BLD AUTO: 0 % (ref 0–3)
ERYTHROCYTE [DISTWIDTH] IN BLOOD BY AUTOMATED COUNT: 13.6 % (ref 11.3–14.5)
GFR SERPL CREATININE-BSD FRML MDRD: 89 ML/MIN/1.73
GLOBULIN UR ELPH-MCNC: 2.1 GM/DL
GLUCOSE BLD-MCNC: 98 MG/DL (ref 70–100)
HCT VFR BLD AUTO: 38.2 % (ref 34.5–44)
HGB BLD-MCNC: 12.8 G/DL (ref 11.5–15.5)
IMM GRANULOCYTES # BLD: 0.04 10*3/MM3 (ref 0–0.03)
IMM GRANULOCYTES NFR BLD: 0.6 % (ref 0–0.6)
LYMPHOCYTES # BLD AUTO: 2.24 10*3/MM3 (ref 0.6–4.8)
LYMPHOCYTES NFR BLD AUTO: 34.3 % (ref 24–44)
MCH RBC QN AUTO: 27.8 PG (ref 27–31)
MCHC RBC AUTO-ENTMCNC: 33.5 G/DL (ref 32–36)
MCV RBC AUTO: 82.9 FL (ref 80–99)
MONOCYTES # BLD AUTO: 0.45 10*3/MM3 (ref 0–1)
MONOCYTES NFR BLD AUTO: 6.9 % (ref 0–12)
NEUTROPHILS # BLD AUTO: 3.78 10*3/MM3 (ref 1.5–8.3)
NEUTROPHILS NFR BLD AUTO: 57.7 % (ref 41–71)
PLATELET # BLD AUTO: 217 10*3/MM3 (ref 150–450)
PMV BLD AUTO: 8.8 FL (ref 6–12)
POTASSIUM BLD-SCNC: 3.4 MMOL/L (ref 3.5–5.5)
PROT SERPL-MCNC: 6 G/DL (ref 5.7–8.2)
RBC # BLD AUTO: 4.61 10*6/MM3 (ref 3.89–5.14)
SODIUM BLD-SCNC: 140 MMOL/L (ref 132–146)
WBC NRBC COR # BLD: 6.54 10*3/MM3 (ref 3.5–10.8)

## 2018-07-18 PROCEDURE — 85025 COMPLETE CBC W/AUTO DIFF WBC: CPT

## 2018-07-18 PROCEDURE — 80053 COMPREHEN METABOLIC PANEL: CPT

## 2018-07-18 PROCEDURE — 36415 COLL VENOUS BLD VENIPUNCTURE: CPT

## 2018-07-18 PROCEDURE — 87329 GIARDIA AG IA: CPT

## 2018-07-18 PROCEDURE — 87493 C DIFF AMPLIFIED PROBE: CPT

## 2018-07-18 PROCEDURE — 87209 SMEAR COMPLEX STAIN: CPT

## 2018-07-18 PROCEDURE — 87045 FECES CULTURE AEROBIC BACT: CPT

## 2018-07-18 PROCEDURE — 87177 OVA AND PARASITES SMEARS: CPT

## 2018-07-18 PROCEDURE — 87046 STOOL CULTR AEROBIC BACT EA: CPT

## 2018-07-18 NOTE — TELEPHONE ENCOUNTER
I have ordered blood work and stool studies, please have her go to one of the [facility] today to get this done

## 2018-07-18 NOTE — TELEPHONE ENCOUNTER
----- Message from April LUIS Vladimir sent at 7/18/2018 12:01 PM EDT -----  Contact: PT   CALL FROM PT STATING THAT SHE IS STILL HAVING RUNNY STOOLS, STATES THAT IT IS NOT SOLID BUT IS NOT AS RUNNY AS IT WAS AND NOW SHE BELIEVES THAT THERE IS BLOOD IN IT. STATES THAT IT IS BRIGHT RED. SHE STATES THAT SHE IS ON HER PERIOD BUT SHE HAS A TAMPON IN SO SHE DOES NOT THINK THAT IT IS FROM THAT. CALL BACK NUMBER FOR [phone].

## 2018-07-20 LAB
BACTERIA SPEC AEROBE CULT: NORMAL
G LAMBLIA AG STL QL IA: NEGATIVE

## 2018-07-23 ENCOUNTER — TELEPHONE (OUTPATIENT)
Dept: FAMILY MEDICINE CLINIC | Facility: CLINIC | Age: 33
End: 2018-07-23

## 2018-07-24 ENCOUNTER — TRANSCRIBE ORDERS (OUTPATIENT)
Dept: FAMILY MEDICINE CLINIC | Facility: CLINIC | Age: 33
End: 2018-07-24

## 2018-07-24 DIAGNOSIS — K52.9 CHRONIC DIARRHEA: Primary | ICD-10-CM

## 2018-07-26 LAB
O+P SPEC MICRO: NORMAL
O+P STL TRI STN: NORMAL

## 2018-09-04 ENCOUNTER — OFFICE VISIT (OUTPATIENT)
Dept: FAMILY MEDICINE CLINIC | Facility: CLINIC | Age: 33
End: 2018-09-04

## 2018-09-04 VITALS
SYSTOLIC BLOOD PRESSURE: 142 MMHG | BODY MASS INDEX: 43.95 KG/M2 | TEMPERATURE: 98.9 F | OXYGEN SATURATION: 99 % | HEART RATE: 110 BPM | HEIGHT: 68 IN | DIASTOLIC BLOOD PRESSURE: 86 MMHG | WEIGHT: 290 LBS

## 2018-09-04 DIAGNOSIS — J03.90 TONSILLITIS: Primary | ICD-10-CM

## 2018-09-04 DIAGNOSIS — F41.9 ANXIETY: ICD-10-CM

## 2018-09-04 PROCEDURE — 96372 THER/PROPH/DIAG INJ SC/IM: CPT | Performed by: PHYSICIAN ASSISTANT

## 2018-09-04 PROCEDURE — 99213 OFFICE O/P EST LOW 20 MIN: CPT | Performed by: PHYSICIAN ASSISTANT

## 2018-09-04 RX ORDER — CEFTRIAXONE 1 G/1
1 INJECTION, POWDER, FOR SOLUTION INTRAMUSCULAR; INTRAVENOUS ONCE
Status: COMPLETED | OUTPATIENT
Start: 2018-09-04 | End: 2018-09-04

## 2018-09-04 RX ORDER — TRIAMCINOLONE ACETONIDE 40 MG/ML
40 INJECTION, SUSPENSION INTRA-ARTICULAR; INTRAMUSCULAR ONCE
Status: COMPLETED | OUTPATIENT
Start: 2018-09-04 | End: 2018-09-04

## 2018-09-04 RX ORDER — AZITHROMYCIN 250 MG/1
TABLET, FILM COATED ORAL
Qty: 6 TABLET | Refills: 0 | Status: SHIPPED | OUTPATIENT
Start: 2018-09-04 | End: 2019-01-28

## 2018-09-04 RX ORDER — IBUPROFEN 800 MG/1
800 TABLET ORAL EVERY 6 HOURS PRN
Qty: 40 TABLET | Refills: 5 | OUTPATIENT
Start: 2018-09-04 | End: 2021-08-12

## 2018-09-04 RX ADMIN — TRIAMCINOLONE ACETONIDE 40 MG: 40 INJECTION, SUSPENSION INTRA-ARTICULAR; INTRAMUSCULAR at 14:09

## 2018-09-04 RX ADMIN — CEFTRIAXONE 1 G: 1 INJECTION, POWDER, FOR SOLUTION INTRAMUSCULAR; INTRAVENOUS at 14:08

## 2018-09-04 NOTE — PROGRESS NOTES
Subjective   Dianna Caicedo is a 33 y.o. female  Sore Throat (sore throat x2 days ); Fever (fever this morning ); and Generalized Body Aches (body aches x1 day )      History of Present Illness  Patient is here today with concerns of sore throat for the past 2 days difficulty swallowing, fever and body aches as well as headache.  Additionally she needs refill on her Zoloft which is working very well for her anxiety.  The following portions of the patient's history were reviewed and updated as appropriate: allergies, current medications, past social history and problem list    Review of Systems   Constitutional: Positive for appetite change, chills, diaphoresis, fatigue and fever.   HENT: Positive for sore throat, trouble swallowing and voice change.    Respiratory: Negative for apnea and shortness of breath.    Skin: Negative.    Neurological: Positive for headaches.   Psychiatric/Behavioral: Negative.        Objective     Vitals:    09/04/18 1338   BP: 142/86   Pulse: 110   Temp: 98.9 °F (37.2 °C)   SpO2: 99%       Physical Exam   Constitutional: She is oriented to person, place, and time. She appears well-developed and well-nourished. No distress.   HENT:   Head: Normocephalic and atraumatic.   Right Ear: External ear normal.   Left Ear: External ear normal.   Nose: Nose normal.   Mouth/Throat: Uvula is midline. Oropharyngeal exudate present. Tonsils are 2+ on the right. Tonsils are 2+ on the left. Tonsillar exudate.   Eyes: Conjunctivae are normal. Right eye exhibits no discharge. Left eye exhibits no discharge.   Neck: Normal range of motion. Neck supple. No thyroid mass and no thyromegaly present.   Cardiovascular: Normal rate, regular rhythm and normal heart sounds.    Pulmonary/Chest: Effort normal and breath sounds normal. No respiratory distress.   Lymphadenopathy:     She has cervical adenopathy.        Right cervical: Superficial cervical adenopathy present.        Left cervical: Superficial cervical  adenopathy present.   Neurological: She is alert and oriented to person, place, and time. No cranial nerve deficit.   Skin: Skin is warm and dry. She is not diaphoretic.   Psychiatric: She has a normal mood and affect. Her speech is normal and behavior is normal. Judgment and thought content normal. Her mood appears not anxious. Her affect is not angry and not inappropriate. Cognition and memory are normal. She does not exhibit a depressed mood. She is attentive.   Nursing note and vitals reviewed.      Assessment/Plan     Diagnoses and all orders for this visit:    Tonsillitis  -     cefTRIAXone (ROCEPHIN) injection 1 g; Inject 1 g into the appropriate muscle as directed by prescriber 1 (One) Time.  -     triamcinolone acetonide (KENALOG-40) injection 40 mg; Inject 1 mL into the appropriate muscle as directed by prescriber 1 (One) Time.    Anxiety  -     sertraline (ZOLOFT) 50 MG tablet; Take 1 tablet by mouth Daily.  -     cefTRIAXone (ROCEPHIN) injection 1 g; Inject 1 g into the appropriate muscle as directed by prescriber 1 (One) Time.  -     triamcinolone acetonide (KENALOG-40) injection 40 mg; Inject 1 mL into the appropriate muscle as directed by prescriber 1 (One) Time.    Other orders  -     azithromycin (ZITHROMAX) 250 MG tablet; Take 2 tablets the first day, then 1 tablet daily for 4 days.  -     ibuprofen (ADVIL,MOTRIN) 800 MG tablet; Take 1 tablet by mouth Every 6 (Six) Hours As Needed for Mild Pain .

## 2018-10-01 DIAGNOSIS — I10 ESSENTIAL HYPERTENSION: ICD-10-CM

## 2018-10-02 RX ORDER — HYDROCHLOROTHIAZIDE 25 MG/1
TABLET ORAL
Qty: 60 TABLET | Refills: 1 | Status: SHIPPED | OUTPATIENT
Start: 2018-10-02 | End: 2019-04-08 | Stop reason: SDUPTHER

## 2019-01-28 ENCOUNTER — OFFICE VISIT (OUTPATIENT)
Dept: FAMILY MEDICINE CLINIC | Facility: CLINIC | Age: 34
End: 2019-01-28

## 2019-01-28 VITALS
OXYGEN SATURATION: 98 % | BODY MASS INDEX: 44.41 KG/M2 | SYSTOLIC BLOOD PRESSURE: 146 MMHG | TEMPERATURE: 98.4 F | WEIGHT: 293 LBS | DIASTOLIC BLOOD PRESSURE: 98 MMHG | HEIGHT: 68 IN | HEART RATE: 101 BPM

## 2019-01-28 DIAGNOSIS — I10 ESSENTIAL HYPERTENSION: Primary | ICD-10-CM

## 2019-01-28 DIAGNOSIS — E66.01 CLASS 3 SEVERE OBESITY WITH SERIOUS COMORBIDITY AND BODY MASS INDEX (BMI) OF 45.0 TO 49.9 IN ADULT, UNSPECIFIED OBESITY TYPE (HCC): ICD-10-CM

## 2019-01-28 DIAGNOSIS — F32.A DEPRESSION, UNSPECIFIED DEPRESSION TYPE: ICD-10-CM

## 2019-01-28 PROCEDURE — 99214 OFFICE O/P EST MOD 30 MIN: CPT | Performed by: PHYSICIAN ASSISTANT

## 2019-01-28 RX ORDER — LISINOPRIL 40 MG/1
40 TABLET ORAL DAILY
Qty: 90 TABLET | Refills: 1 | Status: SHIPPED | OUTPATIENT
Start: 2019-01-28 | End: 2019-08-26 | Stop reason: SDUPTHER

## 2019-01-28 RX ORDER — MEDROXYPROGESTERONE ACETATE 10 MG/1
TABLET ORAL
COMMUNITY
Start: 2018-12-05 | End: 2019-07-23

## 2019-01-28 RX ORDER — BUPROPION HYDROCHLORIDE 150 MG/1
150 TABLET ORAL DAILY
Qty: 30 TABLET | Refills: 3 | Status: SHIPPED | OUTPATIENT
Start: 2019-01-28 | End: 2019-04-24

## 2019-01-28 RX ORDER — LISINOPRIL 20 MG/1
20 TABLET ORAL DAILY
COMMUNITY
End: 2019-01-28 | Stop reason: SDUPTHER

## 2019-01-28 NOTE — PROGRESS NOTES
Subjective   Dianna Caicedo is a 33 y.o. female  Hypertension (Follow up on BP, req 90 day supply on Lisinopril ); Depression (Follow up on depression, wants to discuss other options); and Obesity (Follow up on weight, wants to discuss options )      History of Present Illness  Patient's here today for evaluation treatment of 3 uncontrolled medical problems.  She recently was switched from valsartan to lisinopril due to a medication recall.  Her blood pressures been elevated ever since.  She denies ever specialist medication.  She's also here follow-up on depression she has been on 50 mg of Zoloft for the past year and initially thought it is helping but has felt increasingly more depressed over the past 6 months.  Denies any homicidal or suicidal ideations.  She is very frustrated about her weight.  She states she is trying to follow a low calorie high-protein diet but her weight continues to increase to a total of approximately 20 pounds over the past year.  The following portions of the patient's history were reviewed and updated as appropriate: allergies, current medications, past social history and problem list    Review of Systems   Constitutional: Positive for activity change. Negative for appetite change, fatigue and unexpected weight change.   Respiratory: Negative for cough, chest tightness and shortness of breath.    Cardiovascular: Negative for chest pain, palpitations and leg swelling.   Gastrointestinal: Negative for abdominal distention, abdominal pain, diarrhea and nausea.   Skin: Negative for color change and rash.   Neurological: Negative for dizziness, tremors, syncope, weakness, light-headedness and headaches.   Psychiatric/Behavioral: Positive for dysphoric mood and sleep disturbance. Negative for agitation, behavioral problems, confusion, decreased concentration, hallucinations, self-injury and suicidal ideas. The patient is not nervous/anxious and is not hyperactive.        Objective     Vitals:     01/28/19 1322   BP: 146/98   Pulse: 101   Temp: 98.4 °F (36.9 °C)   SpO2: 98%       Physical Exam   Constitutional: She is oriented to person, place, and time. She appears well-developed and well-nourished. No distress.   Obesity noted     Neck: No JVD present. No thyroid mass and no thyromegaly present.   Cardiovascular: Normal rate, regular rhythm, normal heart sounds and intact distal pulses.   No murmur heard.  Pulmonary/Chest: Effort normal and breath sounds normal. No respiratory distress. She exhibits no tenderness.   Abdominal: Soft. She exhibits no distension. There is no tenderness.   Musculoskeletal: She exhibits no edema.   Neurological: She is alert and oriented to person, place, and time.   Skin: Skin is warm and dry. She is not diaphoretic. No erythema. No pallor.   Psychiatric: Her speech is normal and behavior is normal. Judgment and thought content normal. Her mood appears not anxious. Her affect is not angry and not inappropriate. Cognition and memory are normal. She exhibits a depressed mood. She is attentive.   Nursing note and vitals reviewed.      Assessment/Plan     Diagnoses and all orders for this visit:    Essential hypertension    Depression, unspecified depression type    Class 3 severe obesity with serious comorbidity and body mass index (BMI) of 45.0 to 49.9 in adult, unspecified obesity type (CMS/HCC)    Other orders  -     medroxyPROGESTERone (PROVERA) 10 MG tablet;   -     Discontinue: lisinopril (PRINIVIL,ZESTRIL) 20 MG tablet; Take 20 mg by mouth Daily.  -     lisinopril (PRINIVIL,ZESTRIL) 40 MG tablet; Take 1 tablet by mouth Daily. New dose  -     buPROPion XL (WELLBUTRIN XL) 150 MG 24 hr tablet; Take 1 tablet by mouth Daily. For depression    Encouraged patient to follow a low-calorie, low-carb and high protein diet, prescription given for phentermine 37.5 mg tablets one daily #30 with no refills, discussed abuse potential this medication, Bogdan reviewed, appropriate,  follow-up in office in one month for recheck of blood pressure, depression and weight.

## 2019-02-26 ENCOUNTER — OFFICE VISIT (OUTPATIENT)
Dept: FAMILY MEDICINE CLINIC | Facility: CLINIC | Age: 34
End: 2019-02-26

## 2019-02-26 VITALS
SYSTOLIC BLOOD PRESSURE: 124 MMHG | HEIGHT: 68 IN | DIASTOLIC BLOOD PRESSURE: 84 MMHG | TEMPERATURE: 97.8 F | OXYGEN SATURATION: 99 % | WEIGHT: 289 LBS | HEART RATE: 110 BPM | BODY MASS INDEX: 43.8 KG/M2

## 2019-02-26 DIAGNOSIS — F32.A DEPRESSION, UNSPECIFIED DEPRESSION TYPE: ICD-10-CM

## 2019-02-26 DIAGNOSIS — E66.01 CLASS 3 SEVERE OBESITY WITH BODY MASS INDEX (BMI) OF 40.0 TO 44.9 IN ADULT, UNSPECIFIED OBESITY TYPE, UNSPECIFIED WHETHER SERIOUS COMORBIDITY PRESENT (HCC): ICD-10-CM

## 2019-02-26 DIAGNOSIS — F41.9 ANXIETY: Primary | ICD-10-CM

## 2019-02-26 PROCEDURE — 99214 OFFICE O/P EST MOD 30 MIN: CPT | Performed by: PHYSICIAN ASSISTANT

## 2019-02-26 NOTE — PROGRESS NOTES
Subjective   Dianna Caicedo is a 33 y.o. female  Depression (Follow up on depression, Wellbutrin makes her feel numb and cant sleep) and Weight Check (1 month follow up on weight )      History of Present Illness  + Patient comes in today for 2 separate issues one is for follow-up on obesity she is doing very well weight is down 12 pounds with the assistance of phentermine.  She states this is helping considerably with appetite control and allowing her to reduce her overall daily calories.  Denies any adverse effects from medication.  She is due for refill.  She is continuing to try to follow a low calorie low-carb high-protein diet with lots of water intake on a daily basis.  Second issue is for follow-up on depression she feels that the Wellbutrin is giving her adverse effects is causing her insomnia and causing her to feel emotionally numb.  She would like to come off of this medication.  She is on a problem with anxiety.  She is done well on Zoloft 50 mg for the past year but having breakthrough anxiety and depression.  The following portions of the patient's history were reviewed and updated as appropriate: allergies, current medications, past social history and problem list    Review of Systems   Constitutional: Positive for activity change. Negative for appetite change, fatigue and unexpected weight change.   Respiratory: Negative for chest tightness and shortness of breath.    Cardiovascular: Negative for chest pain.   Gastrointestinal: Negative for abdominal distention, abdominal pain, diarrhea and nausea.   Neurological: Negative for dizziness, tremors, weakness, light-headedness and headaches.   Psychiatric/Behavioral: Positive for dysphoric mood and sleep disturbance. Negative for agitation, behavioral problems, confusion, decreased concentration and suicidal ideas. The patient is nervous/anxious.        Objective     Vitals:    02/26/19 1050   BP: 124/84   Pulse: 110   Temp: 97.8 °F (36.6 °C)   SpO2: 99%        Physical Exam   Constitutional: She is oriented to person, place, and time. She appears well-developed and well-nourished. No distress.   Obesity noted     Neck: No thyroid mass and no thyromegaly present.   Cardiovascular: Normal rate, regular rhythm and normal heart sounds.   Pulmonary/Chest: Effort normal and breath sounds normal.   Abdominal: Soft. There is no tenderness.   Neurological: She is alert and oriented to person, place, and time. No cranial nerve deficit. Coordination normal.   Skin: She is not diaphoretic.   Psychiatric: Her speech is normal and behavior is normal. Judgment and thought content normal. Her mood appears anxious. Her affect is not angry and not inappropriate. Cognition and memory are normal. She does not exhibit a depressed mood. She is attentive.   Nursing note and vitals reviewed.      Assessment/Plan     Diagnoses and all orders for this visit:    Anxiety  -     sertraline (ZOLOFT) 50 MG tablet; TAKE 2 DAILY NEW DOSE    Class 3 severe obesity with body mass index (BMI) of 40.0 to 44.9 in adult, unspecified obesity type, unspecified whether serious comorbidity present (CMS/[facility])    Depression, unspecified depression type    Discontinue Wellbutrin and increase Zoloft to 75 mg daily after 1 week increase to 100 mg daily if anxiety and depressive symptoms are persistent.  Follow-up in 2 weeks if not improved, sooner if any adverse effects with new medication otherwise follow-up in 2 months for recheck of weight/anxiety and depression.  Refilled phentermine 37.5 Milgrim tablets 1 daily #30 with 1 refill discussed abuse potential this medication, Bogdan reviewed, appropriate, encourage patient to continue with low calorie low-carb high-protein diet and daily exercise.

## 2019-04-08 DIAGNOSIS — I10 ESSENTIAL HYPERTENSION: ICD-10-CM

## 2019-04-11 RX ORDER — HYDROCHLOROTHIAZIDE 25 MG/1
TABLET ORAL
Qty: 60 TABLET | Refills: 0 | Status: SHIPPED | OUTPATIENT
Start: 2019-04-11 | End: 2019-06-19 | Stop reason: SDUPTHER

## 2019-04-24 ENCOUNTER — OFFICE VISIT (OUTPATIENT)
Dept: FAMILY MEDICINE CLINIC | Facility: CLINIC | Age: 34
End: 2019-04-24

## 2019-04-24 VITALS
WEIGHT: 285 LBS | DIASTOLIC BLOOD PRESSURE: 88 MMHG | TEMPERATURE: 98.3 F | BODY MASS INDEX: 43.19 KG/M2 | HEIGHT: 68 IN | OXYGEN SATURATION: 99 % | HEART RATE: 104 BPM | SYSTOLIC BLOOD PRESSURE: 134 MMHG

## 2019-04-24 DIAGNOSIS — E66.01 CLASS 3 SEVERE OBESITY WITH BODY MASS INDEX (BMI) OF 40.0 TO 44.9 IN ADULT, UNSPECIFIED OBESITY TYPE, UNSPECIFIED WHETHER SERIOUS COMORBIDITY PRESENT (HCC): ICD-10-CM

## 2019-04-24 DIAGNOSIS — E03.9 HYPOTHYROIDISM, UNSPECIFIED TYPE: ICD-10-CM

## 2019-04-24 DIAGNOSIS — J40 BRONCHITIS: Primary | ICD-10-CM

## 2019-04-24 PROCEDURE — 99214 OFFICE O/P EST MOD 30 MIN: CPT | Performed by: PHYSICIAN ASSISTANT

## 2019-04-24 RX ORDER — LEVOTHYROXINE SODIUM 0.2 MG/1
200 TABLET ORAL DAILY
Qty: 90 TABLET | Refills: 1 | Status: SHIPPED | OUTPATIENT
Start: 2019-04-24 | End: 2019-07-23 | Stop reason: SDUPTHER

## 2019-04-24 RX ORDER — LEVOTHYROXINE SODIUM 0.2 MG/1
200 TABLET ORAL DAILY
Qty: 30 TABLET | Refills: 5 | Status: SHIPPED | OUTPATIENT
Start: 2019-04-24 | End: 2019-04-24 | Stop reason: SDUPTHER

## 2019-04-24 RX ORDER — AZITHROMYCIN 250 MG/1
TABLET, FILM COATED ORAL
Qty: 6 TABLET | Refills: 1 | Status: SHIPPED | OUTPATIENT
Start: 2019-04-24 | End: 2019-07-23

## 2019-04-24 RX ORDER — BENZONATATE 200 MG/1
200 CAPSULE ORAL 3 TIMES DAILY PRN
Qty: 21 CAPSULE | Refills: 1 | Status: SHIPPED | OUTPATIENT
Start: 2019-04-24 | End: 2019-07-23

## 2019-04-24 NOTE — PROGRESS NOTES
Subjective   Dianna Caicedo is a 34 y.o. female  Hypothyroidism (Req refill on levothyroxine, possible bloodwork ); Weight Check (Follow up on weight, req refill on Phentermine ); and Cough (Semi productive cough with some wheezing )      History of Present Illness  Patient comes in today for 3 separate issues to chronic medical problems which are stable needing medication refills and one new problem which is uncontrolled.  Hypothyroidism and obesity are stable medications she needs refills on phentermine and levothyroxine.  Doing well in these medications and denies any adverse effects.  Cough is been ongoing for the past week with some wheezing.  She finished Augmentin 2 weeks ago for strep throat.  She states her throat is feeling better but the cough started a few days after that.  Cough is dry.  The following portions of the patient's history were reviewed and updated as appropriate: allergies, current medications, past social history and problem list    Review of Systems   Constitutional: Positive for activity change and appetite change. Negative for chills, fatigue, fever and unexpected weight change.   HENT: Negative.    Eyes: Negative for visual disturbance.   Respiratory: Positive for cough, chest tightness and wheezing. Negative for shortness of breath.    Cardiovascular: Negative for chest pain and palpitations.   Gastrointestinal: Negative for abdominal distention, abdominal pain, constipation, diarrhea and nausea.   Endocrine: Negative for cold intolerance and heat intolerance.   Neurological: Negative for tremors.   Psychiatric/Behavioral: Negative for agitation and dysphoric mood. The patient is not nervous/anxious.        Objective     Vitals:    04/24/19 0848   BP: 134/88   Pulse: 104   Temp: 98.3 °F (36.8 °C)   SpO2: 99%       Physical Exam   Constitutional: She appears well-developed and well-nourished. No distress.   Obesity noted     HENT:   Head: Normocephalic and atraumatic.   Nose: Nose  normal.   Mouth/Throat: Oropharynx is clear and moist.   No Exopthalmos   Neck: Neck supple. No JVD present. No thyromegaly present.   Cardiovascular: Normal rate, regular rhythm and normal heart sounds.   No murmur heard.  Pulmonary/Chest: Effort normal. No stridor. No respiratory distress. She has wheezes.   Abdominal: Soft. There is no tenderness.   Musculoskeletal: She exhibits no edema.   Lymphadenopathy:     She has no cervical adenopathy.   Skin: Skin is warm and dry. She is not diaphoretic.   Psychiatric: She has a normal mood and affect. Her behavior is normal. Judgment and thought content normal.   Nursing note and vitals reviewed.      Assessment/Plan     Diagnoses and all orders for this visit:    Bronchitis    Hypothyroidism, unspecified type    Class 3 severe obesity with body mass index (BMI) of 40.0 to 44.9 in adult, unspecified obesity type, unspecified whether serious comorbidity present (CMS/[facility])    Other orders  -     Discontinue: levothyroxine (SYNTHROID) 200 MCG tablet; Take 1 tablet by mouth Daily. New dosage, repeat TSH in 6-8 weeks  -     azithromycin (ZITHROMAX Z-AMANDA) 250 MG tablet; Take 2 tablets the first day, then 1 tablet daily for 4 days.  -     benzonatate (TESSALON) 200 MG capsule; Take 1 capsule by mouth 3 (Three) Times a Day As Needed for Cough.  -     levothyroxine (SYNTHROID) 200 MCG tablet; Take 1 tablet by mouth Daily. New dosage, repeat TSH in 6-8 weeks    Refilled phentermine 37.5 mg tablets 1 daily #30 with 1 refill discussed abuse potential medication importance of following a low calorie low-carb high-protein diet with regular daily exercise follow-up in 2 months for recheck of weight.

## 2019-06-19 DIAGNOSIS — I10 ESSENTIAL HYPERTENSION: ICD-10-CM

## 2019-06-19 DIAGNOSIS — F41.9 ANXIETY: ICD-10-CM

## 2019-06-19 RX ORDER — HYDROCHLOROTHIAZIDE 25 MG/1
25 TABLET ORAL 2 TIMES DAILY
Qty: 180 TABLET | Refills: 0 | Status: SHIPPED | OUTPATIENT
Start: 2019-06-19 | End: 2020-02-05 | Stop reason: SDUPTHER

## 2019-07-23 ENCOUNTER — LAB (OUTPATIENT)
Dept: LAB | Facility: HOSPITAL | Age: 34
End: 2019-07-23

## 2019-07-23 ENCOUNTER — OFFICE VISIT (OUTPATIENT)
Dept: FAMILY MEDICINE CLINIC | Facility: CLINIC | Age: 34
End: 2019-07-23

## 2019-07-23 VITALS
WEIGHT: 289 LBS | HEIGHT: 68 IN | TEMPERATURE: 98.2 F | BODY MASS INDEX: 43.8 KG/M2 | OXYGEN SATURATION: 99 % | DIASTOLIC BLOOD PRESSURE: 80 MMHG | HEART RATE: 86 BPM | SYSTOLIC BLOOD PRESSURE: 124 MMHG

## 2019-07-23 DIAGNOSIS — L02.93 RECURRENT BOILS: Primary | ICD-10-CM

## 2019-07-23 DIAGNOSIS — E03.9 ACQUIRED HYPOTHYROIDISM: ICD-10-CM

## 2019-07-23 DIAGNOSIS — G47.33 OSA (OBSTRUCTIVE SLEEP APNEA): ICD-10-CM

## 2019-07-23 LAB — TSH SERPL DL<=0.05 MIU/L-ACNC: 1.21 MIU/ML (ref 0.27–4.2)

## 2019-07-23 PROCEDURE — 99213 OFFICE O/P EST LOW 20 MIN: CPT | Performed by: PHYSICIAN ASSISTANT

## 2019-07-23 PROCEDURE — 84443 ASSAY THYROID STIM HORMONE: CPT

## 2019-07-23 PROCEDURE — 36415 COLL VENOUS BLD VENIPUNCTURE: CPT

## 2019-07-23 RX ORDER — LEVOTHYROXINE SODIUM 0.2 MG/1
200 TABLET ORAL DAILY
Qty: 90 TABLET | Refills: 3 | Status: SHIPPED | OUTPATIENT
Start: 2019-07-23 | End: 2020-07-07 | Stop reason: SDUPTHER

## 2019-07-23 NOTE — PROGRESS NOTES
Subjective   Dianna Caicedo is a 34 y.o. female  Hypothyroidism (Follow up hypothyroidism, req labs ) and Referrals (Req referral to Dr. Adams for NGUYEN, Dermatology appt for boils on groin)      History of Present Illness  Patient comes in for follow-up on hypothyroidism, she states she is continued to have problems with struggles losing weight and some generalized thinning of hair.  She is taking her thyroid medication daily.  She also needs a referral to pulmonary for her annual follow-up on NGUYEN which is currently stable on a CPAP machine.  Additionally she continues to have problems with recurrent boils in her groin, no actively flared up once today but would like to see dermatology for further treatment options as antibiotics and topical creams have not been successful in reducing the number of outbreaks.  The following portions of the patient's history were reviewed and updated as appropriate: allergies, current medications, past social history and problem list    Review of Systems   Constitutional: Positive for fatigue and unexpected weight change.   Eyes: Negative for visual disturbance.   Respiratory: Positive for apnea ( Stable on CPAP).    Cardiovascular: Negative for chest pain and palpitations.   Gastrointestinal: Negative for constipation and diarrhea.   Endocrine: Negative for cold intolerance and heat intolerance.   Skin:        Recurrent boils in groin.   Neurological: Negative for tremors.   Psychiatric/Behavioral: Negative for agitation. The patient is not nervous/anxious.        Objective     Vitals:    07/23/19 0931   BP: 124/80   Pulse: 86   Temp: 98.2 °F (36.8 °C)   SpO2: 99%       Physical Exam   Constitutional: She appears well-developed and well-nourished. No distress.   Obesity noted     HENT:   Head: Normocephalic.   No Exopthalmos   Neck: No JVD present. No thyromegaly present.   Cardiovascular: Normal rate and regular rhythm.   Pulmonary/Chest: Effort normal and breath sounds normal.    Abdominal: Soft. There is no tenderness.   Lymphadenopathy:     She has no cervical adenopathy.   Skin: Skin is warm and dry. She is not diaphoretic.   Psychiatric: She has a normal mood and affect. Her behavior is normal. Judgment and thought content normal.   Nursing note and vitals reviewed.      Assessment/Plan     Diagnoses and all orders for this visit:    Recurrent boils  -     Ambulatory Referral to Dermatology    Acquired hypothyroidism  -     TSH; Future    NGUYEN (obstructive sleep apnea)  -     Ambulatory Referral to Pulmonology    Other orders  -     levothyroxine (SYNTHROID) 200 MCG tablet; Take 1 tablet by mouth Daily. New dosage, repeat TSH in 6-8 weeks

## 2019-07-30 ENCOUNTER — TELEPHONE (OUTPATIENT)
Dept: FAMILY MEDICINE CLINIC | Facility: CLINIC | Age: 34
End: 2019-07-30

## 2019-08-01 ENCOUNTER — TRANSCRIBE ORDERS (OUTPATIENT)
Dept: FAMILY MEDICINE CLINIC | Facility: CLINIC | Age: 34
End: 2019-08-01

## 2019-08-01 DIAGNOSIS — E34.9 ENDOCRINE DISORDER, UNSPECIFIED: Primary | ICD-10-CM

## 2019-08-01 DIAGNOSIS — M25.562 ACUTE PAIN OF LEFT KNEE: ICD-10-CM

## 2019-08-27 RX ORDER — LISINOPRIL 40 MG/1
TABLET ORAL
Qty: 90 TABLET | Refills: 1 | Status: SHIPPED | OUTPATIENT
Start: 2019-08-27 | End: 2020-07-07 | Stop reason: SDUPTHER

## 2019-08-28 ENCOUNTER — OFFICE VISIT (OUTPATIENT)
Dept: ORTHOPEDIC SURGERY | Facility: CLINIC | Age: 34
End: 2019-08-28

## 2019-08-28 VITALS — WEIGHT: 284.83 LBS | HEART RATE: 95 BPM | OXYGEN SATURATION: 98 % | BODY MASS INDEX: 43.17 KG/M2 | HEIGHT: 68 IN

## 2019-08-28 DIAGNOSIS — M17.5 OTHER SECONDARY OSTEOARTHRITIS OF RIGHT KNEE: Primary | ICD-10-CM

## 2019-08-28 PROCEDURE — 99203 OFFICE O/P NEW LOW 30 MIN: CPT | Performed by: ORTHOPAEDIC SURGERY

## 2019-08-28 RX ORDER — CLINDAMYCIN PHOSPHATE 11.9 MG/ML
SOLUTION TOPICAL AS NEEDED
COMMUNITY
Start: 2019-07-30 | End: 2023-01-16

## 2019-08-28 RX ORDER — IBUPROFEN 600 MG/1
TABLET ORAL
COMMUNITY
Start: 2019-08-18 | End: 2020-02-05
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
## 2019-09-05 ENCOUNTER — HOSPITAL ENCOUNTER (OUTPATIENT)
Dept: MRI IMAGING | Facility: HOSPITAL | Age: 34
Discharge: HOME | End: 2019-09-05
Admitting: ORTHOPAEDIC SURGERY

## 2019-09-05 DIAGNOSIS — M17.5 OTHER SECONDARY OSTEOARTHRITIS OF RIGHT KNEE: ICD-10-CM

## 2019-09-05 PROCEDURE — 73721 MRI JNT OF LWR EXTRE W/O DYE: CPT

## 2019-09-13 ENCOUNTER — OFFICE VISIT (OUTPATIENT)
Dept: ORTHOPEDIC SURGERY | Facility: CLINIC | Age: 34
End: 2019-09-13

## 2019-09-13 VITALS — BODY MASS INDEX: 43.77 KG/M2 | WEIGHT: 288.8 LBS | HEART RATE: 114 BPM | HEIGHT: 68 IN | OXYGEN SATURATION: 98 %

## 2019-09-13 DIAGNOSIS — M17.5 OTHER SECONDARY OSTEOARTHRITIS OF RIGHT KNEE: Primary | ICD-10-CM

## 2019-09-13 PROCEDURE — 20610 DRAIN/INJ JOINT/BURSA W/O US: CPT | Performed by: ORTHOPAEDIC SURGERY

## 2019-09-13 PROCEDURE — 99213 OFFICE O/P EST LOW 20 MIN: CPT | Performed by: ORTHOPAEDIC SURGERY

## 2019-09-13 RX ORDER — ROPIVACAINE HYDROCHLORIDE 5 MG/ML
4 INJECTION, SOLUTION EPIDURAL; INFILTRATION; PERINEURAL
Status: COMPLETED | OUTPATIENT
Start: 2019-09-13 | End: 2019-09-13

## 2019-09-13 RX ORDER — DOXYCYCLINE HYCLATE 100 MG/1
CAPSULE ORAL
COMMUNITY
Start: 2019-09-05 | End: 2020-02-05

## 2019-09-13 RX ORDER — FLUCONAZOLE 150 MG/1
TABLET ORAL
COMMUNITY
Start: 2019-09-05 | End: 2019-10-23

## 2019-09-13 RX ORDER — TRIAMCINOLONE ACETONIDE 40 MG/ML
40 INJECTION, SUSPENSION INTRA-ARTICULAR; INTRAMUSCULAR
Status: COMPLETED | OUTPATIENT
Start: 2019-09-13 | End: 2019-09-13

## 2019-09-13 RX ADMIN — ROPIVACAINE HYDROCHLORIDE 4 ML: 5 INJECTION, SOLUTION EPIDURAL; INFILTRATION; PERINEURAL at 11:35

## 2019-09-13 RX ADMIN — TRIAMCINOLONE ACETONIDE 40 MG: 40 INJECTION, SUSPENSION INTRA-ARTICULAR; INTRAMUSCULAR at 11:35
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
## 2019-09-13 NOTE — PROGRESS NOTES
Procedure   Large Joint Arthrocentesis: R knee  Date/Time: 9/13/2019 11:35 AM  Consent given by: patient  Site marked: site marked  Timeout: Immediately prior to procedure a time out was called to verify the correct patient, procedure, equipment, support staff and site/side marked as required   Supporting Documentation  Indications: pain   Procedure Details  Location: knee - R knee  Preparation: Patient was prepped and draped in the usual sterile fashion  Needle size: 22 G  Approach: anterolateral  Medications administered: 4 mL ropivacaine 0.5 %; 40 mg triamcinolone acetonide 40 MG/ML  Aspirate amount: 0 mL  Patient tolerance: patient tolerated the procedure well with no immediate complications

## 2019-10-23 ENCOUNTER — OFFICE VISIT (OUTPATIENT)
Dept: FAMILY MEDICINE CLINIC | Facility: CLINIC | Age: 34
End: 2019-10-23

## 2019-10-23 VITALS
HEIGHT: 65 IN | SYSTOLIC BLOOD PRESSURE: 118 MMHG | TEMPERATURE: 98.4 F | OXYGEN SATURATION: 98 % | BODY MASS INDEX: 48.82 KG/M2 | WEIGHT: 293 LBS | HEART RATE: 101 BPM | DIASTOLIC BLOOD PRESSURE: 66 MMHG

## 2019-10-23 DIAGNOSIS — J18.9 COMMUNITY ACQUIRED PNEUMONIA OF LEFT LOWER LOBE OF LUNG: Primary | ICD-10-CM

## 2019-10-23 PROCEDURE — 99213 OFFICE O/P EST LOW 20 MIN: CPT | Performed by: PHYSICIAN ASSISTANT

## 2019-10-23 RX ORDER — BENZONATATE 200 MG/1
200 CAPSULE ORAL 3 TIMES DAILY PRN
Qty: 30 CAPSULE | Refills: 1 | Status: SHIPPED | OUTPATIENT
Start: 2019-10-23 | End: 2019-11-11

## 2019-10-23 RX ORDER — CLARITHROMYCIN 500 MG/1
500 TABLET, COATED ORAL 2 TIMES DAILY
Qty: 14 TABLET | Refills: 0 | Status: SHIPPED | OUTPATIENT
Start: 2019-10-23 | End: 2019-11-11

## 2019-10-23 NOTE — PROGRESS NOTES
Subjective   Dianna Caicedo is a 34 y.o. female  Pneumonia (Follow up [facility]  for pneumonia, still having some SOB, completed Zpack)      History of Present Illness  Patient comes in for follow-up on recent diagnosis of left lower lobe pneumonia community acquired.  She has been taking Vantin and Z-Adrian and states she still feeling very weak and has a persistent cough and chest congestion.  She had a fever initially, fever does not appear  The following portions of the patient's history were reviewed and updated as appropriate: allergies, current medications, past social history and problem list    Review of Systems   Constitutional: Positive for fatigue. Negative for chills, diaphoresis, fever and unexpected weight change.   HENT: Positive for congestion.    Respiratory: Positive for cough and shortness of breath. Negative for choking, chest tightness and wheezing.    Cardiovascular: Negative for chest pain and leg swelling.   Gastrointestinal: Negative.  Negative for nausea.   Musculoskeletal: Negative for back pain.   Allergic/Immunologic: Negative for environmental allergies, food allergies and immunocompromised state.       Objective     Vitals:    10/23/19 1501   BP: 118/66   Pulse: 101   Temp: 98.4 °F (36.9 °C)   SpO2: 98%       Physical Exam   Constitutional: She appears well-developed and well-nourished. She has a sickly appearance. No distress.   HENT:   Head: Normocephalic and atraumatic.   Nose: Nose normal.   Mouth/Throat: Oropharynx is clear and moist.   Neck: Neck supple. No JVD present.   Cardiovascular: Normal rate, regular rhythm and normal heart sounds.   No murmur heard.  Pulmonary/Chest: Effort normal. No stridor. No respiratory distress. She has no wheezes. She has rales ( Left lower lobe).   Musculoskeletal: She exhibits no edema.   Lymphadenopathy:     She has no cervical adenopathy.   Skin: Skin is warm and dry. She is not diaphoretic. No pallor.   Nursing note and vitals  reviewed.      Assessment/Plan     Diagnoses and all orders for this visit:    Community acquired pneumonia of left lower lobe of lung (CMS/HCC)  -     XR Chest PA & Lateral; Future    Other orders  -     clarithromycin (BIAXIN) 500 MG tablet; Take 1 tablet by mouth 2 (Two) Times a Day.  -     benzonatate (TESSALON) 200 MG capsule; Take 1 capsule by mouth 3 (Three) Times a Day As Needed for Cough.    Check chest x-ray in 1 week.  Work note given for 1 week due to persistent pneumonia.

## 2019-10-31 ENCOUNTER — HOSPITAL ENCOUNTER (OUTPATIENT)
Dept: GENERAL RADIOLOGY | Facility: HOSPITAL | Age: 34
Discharge: HOME | End: 2019-10-31
Admitting: PHYSICIAN ASSISTANT

## 2019-10-31 DIAGNOSIS — J18.9 COMMUNITY ACQUIRED PNEUMONIA OF LEFT LOWER LOBE OF LUNG: ICD-10-CM

## 2019-10-31 PROCEDURE — 71046 X-RAY EXAM CHEST 2 VIEWS: CPT

## 2019-11-11 ENCOUNTER — OFFICE VISIT (OUTPATIENT)
Dept: ORTHOPEDIC SURGERY | Facility: CLINIC | Age: 34
End: 2019-11-11

## 2019-11-11 VITALS — WEIGHT: 293 LBS | HEIGHT: 65 IN | HEART RATE: 103 BPM | OXYGEN SATURATION: 98 % | BODY MASS INDEX: 48.82 KG/M2

## 2019-11-11 DIAGNOSIS — M17.5 OTHER SECONDARY OSTEOARTHRITIS OF RIGHT KNEE: Primary | ICD-10-CM

## 2019-11-11 PROCEDURE — 99212 OFFICE O/P EST SF 10 MIN: CPT | Performed by: ORTHOPAEDIC SURGERY
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
## 2019-12-31 ENCOUNTER — TELEPHONE (OUTPATIENT)
Dept: FAMILY MEDICINE CLINIC | Facility: CLINIC | Age: 34
End: 2019-12-31

## 2019-12-31 NOTE — TELEPHONE ENCOUNTER
Pt just over pneumonia 1 mo ago. Pt stated she believes the congestion has moved back into her chest. Pt wanted to know if there was any way to be worked in for an appt today?

## 2019-12-31 NOTE — TELEPHONE ENCOUNTER
Sorry, we do not have any openings today, if she feels worse then I would recommend she go to the [facility].

## 2020-02-05 ENCOUNTER — OFFICE VISIT (OUTPATIENT)
Dept: FAMILY MEDICINE CLINIC | Facility: CLINIC | Age: 35
End: 2020-02-05

## 2020-02-05 VITALS
BODY MASS INDEX: 44.41 KG/M2 | SYSTOLIC BLOOD PRESSURE: 114 MMHG | TEMPERATURE: 98.8 F | DIASTOLIC BLOOD PRESSURE: 82 MMHG | HEIGHT: 68 IN | HEART RATE: 100 BPM | OXYGEN SATURATION: 99 % | WEIGHT: 293 LBS

## 2020-02-05 DIAGNOSIS — F41.9 ANXIETY: ICD-10-CM

## 2020-02-05 DIAGNOSIS — M54.9 BACK PAIN, UNSPECIFIED BACK LOCATION, UNSPECIFIED BACK PAIN LATERALITY, UNSPECIFIED CHRONICITY: Primary | ICD-10-CM

## 2020-02-05 DIAGNOSIS — Z23 IMMUNIZATION DUE: ICD-10-CM

## 2020-02-05 DIAGNOSIS — E66.01 CLASS 3 SEVERE OBESITY WITH SERIOUS COMORBIDITY AND BODY MASS INDEX (BMI) OF 45.0 TO 49.9 IN ADULT, UNSPECIFIED OBESITY TYPE (HCC): ICD-10-CM

## 2020-02-05 DIAGNOSIS — I10 ESSENTIAL HYPERTENSION: ICD-10-CM

## 2020-02-05 DIAGNOSIS — S39.012A STRAIN OF LUMBAR REGION, INITIAL ENCOUNTER: ICD-10-CM

## 2020-02-05 LAB
BILIRUB BLD-MCNC: NEGATIVE MG/DL
CLARITY, POC: CLEAR
COLOR UR: YELLOW
GLUCOSE UR STRIP-MCNC: NEGATIVE MG/DL
KETONES UR QL: NEGATIVE
LEUKOCYTE EST, POC: NEGATIVE
NITRITE UR-MCNC: NEGATIVE MG/ML
PH UR: 6 [PH] (ref 5–8)
PROT UR STRIP-MCNC: NEGATIVE MG/DL
RBC # UR STRIP: NEGATIVE /UL
SP GR UR: 1.02 (ref 1–1.03)
UROBILINOGEN UR QL: NORMAL

## 2020-02-05 PROCEDURE — 99214 OFFICE O/P EST MOD 30 MIN: CPT | Performed by: PHYSICIAN ASSISTANT

## 2020-02-05 PROCEDURE — 81003 URINALYSIS AUTO W/O SCOPE: CPT | Performed by: PHYSICIAN ASSISTANT

## 2020-02-05 RX ORDER — HYDROCHLOROTHIAZIDE 25 MG/1
25 TABLET ORAL 2 TIMES DAILY
Qty: 180 TABLET | Refills: 3 | Status: SHIPPED | OUTPATIENT
Start: 2020-02-05 | End: 2020-07-07 | Stop reason: SDUPTHER

## 2020-02-05 RX ORDER — TIZANIDINE 2 MG/1
TABLET ORAL
Qty: 20 TABLET | Refills: 1 | Status: SHIPPED | OUTPATIENT
Start: 2020-02-05 | End: 2022-04-26

## 2020-02-05 NOTE — PROGRESS NOTES
Subjective   Dianna Caicedo is a 34 y.o. female  Back Pain (lower right sided back pain x1 week ) and Med Refill (Req med refills on Phentermine to local pharm and HCTZ and Zoloft to Mail order)      History of Present Illness  + Patient comes in today for follow-up on hypertension, anxiety, obesity and for evaluation of a new problem of lower right-sided back pain for the past week.  Anxiety stable on Zoloft due for refills, hypertension stable on HCTZ due for refills, obesity uncontrolled weight is up BMI 45.8, patient has done well on phentermine in the past and requests a refill of this, she is not currently on it.  She has been trying to follow a low calorie diet but having difficulty with appetite suppression.  She is trying to stay active.  Trying to follow a diet low in carbohydrates and low in sugars.  Right-sided back pain started without any history of trauma or change in activities.  No abdominal pain no blood in urine no fever.  No rash.  The following portions of the patient's history were reviewed and updated as appropriate: allergies, current medications, past social history and problem list    Review of Systems   Constitutional: Positive for activity change and appetite change. Negative for fatigue and unexpected weight change.   Respiratory: Negative.  Negative for cough, chest tightness and shortness of breath.    Cardiovascular: Negative for chest pain, palpitations and leg swelling.   Gastrointestinal: Negative.  Negative for abdominal distention, abdominal pain, diarrhea and nausea.   Genitourinary: Negative.    Musculoskeletal: Positive for back pain. Negative for arthralgias, gait problem and myalgias.   Skin: Negative for color change and rash.   Neurological: Negative for dizziness, tremors, syncope, weakness, light-headedness, numbness and headaches.   Psychiatric/Behavioral: Negative for agitation, behavioral problems, confusion, decreased concentration, dysphoric mood, sleep disturbance  and suicidal ideas. The patient is nervous/anxious.        Objective     Vitals:    02/05/20 1631   BP: 114/82   Pulse: 100   Temp: 98.8 °F (37.1 °C)   SpO2: 99%       Physical Exam   Constitutional: She is oriented to person, place, and time. She appears well-developed and well-nourished. No distress.   Cardiovascular: Normal rate, regular rhythm and normal heart sounds.   Pulmonary/Chest: Effort normal and breath sounds normal.   Abdominal: Soft. Bowel sounds are normal.   Musculoskeletal:        Lumbar back: She exhibits decreased range of motion, tenderness, pain and spasm. She exhibits no bony tenderness, no swelling and no deformity.        Back:         Arms:  Neurological: She is alert and oriented to person, place, and time. She has normal reflexes.   Skin: Skin is warm and dry. No rash noted. She is not diaphoretic. No erythema. No pallor.   Nursing note and vitals reviewed.  Reviewed results of urinalysis with patient negative in office.    Assessment/Plan     Dianna was seen today for back pain and med refill.    Diagnoses and all orders for this visit:    Back pain, unspecified back location, unspecified back pain laterality, unspecified chronicity  -     POC Urinalysis Dipstick, Automated    Strain of lumbar region, initial encounter    Essential hypertension  -     hydroCHLOROthiazide (HYDRODIURIL) 25 MG tablet; Take 1 tablet by mouth 2 (Two) Times a Day.    Anxiety  -     sertraline (ZOLOFT) 50 MG tablet; TAKE 2 DAILY NEW DOSE    Class 3 severe obesity with serious comorbidity and body mass index (BMI) of 45.0 to 49.9 in adult, unspecified obesity type (CMS/[facility])    Immunization due  -     influenza vac split quad (FLUZONE,FLUARIX,AFLURIA) injection 0.5 mL    Other orders  -     tiZANidine (ZANAFLEX) 2 MG tablet; Take 1-2 nightly for muscle spasm    Phentermine 37.5 mg tablets 1 daily #30 with 2 refills discussed importance of low calorie low-carb diet regular exercise adequate water intake daily and  follow-up in 3 months for recheck.

## 2020-02-07 ENCOUNTER — TELEPHONE (OUTPATIENT)
Dept: FAMILY MEDICINE CLINIC | Facility: CLINIC | Age: 35
End: 2020-02-07

## 2020-02-07 RX ORDER — CEPHALEXIN 500 MG/1
500 CAPSULE ORAL 3 TIMES DAILY
Qty: 15 CAPSULE | Refills: 0 | OUTPATIENT
Start: 2020-02-07 | End: 2020-02-26

## 2020-02-07 NOTE — TELEPHONE ENCOUNTER
I spoke to patient today and she sent me a pic looked like it was infected maybe.  I called her in an antibiotic and told her I would have someone call and check on her Monday also need to report it to [facility] since its an immunization reaction.

## 2020-02-07 NOTE — TELEPHONE ENCOUNTER
Regarding: Visit Follow-Up Question  Contact: [phone]  ----- Message from Sonia Hooker MA sent at 2/7/2020 12:01 PM EST -----       ----- Message from Dianna Caicedo to Charisse Meyer PA-C sent at 2/7/2020 11:31 AM -----   Can you tell me if this is normal from flu shot? It’s a  knot and hot to the touch and red.   I rubbed with the hopes of removing the soreness.

## 2020-02-26 ENCOUNTER — APPOINTMENT (OUTPATIENT)
Dept: CT IMAGING | Facility: HOSPITAL | Age: 35
End: 2020-02-26

## 2020-02-26 ENCOUNTER — APPOINTMENT (OUTPATIENT)
Dept: CARDIOLOGY | Facility: HOSPITAL | Age: 35
End: 2020-02-26

## 2020-02-26 ENCOUNTER — HOSPITAL ENCOUNTER (EMERGENCY)
Facility: HOSPITAL | Age: 35
Discharge: HOME | End: 2020-02-26
Attending: EMERGENCY MEDICINE | Admitting: EMERGENCY MEDICINE

## 2020-02-26 VITALS
DIASTOLIC BLOOD PRESSURE: 82 MMHG | HEIGHT: 68 IN | SYSTOLIC BLOOD PRESSURE: 139 MMHG | TEMPERATURE: 98 F | WEIGHT: 293 LBS | RESPIRATION RATE: 16 BRPM | OXYGEN SATURATION: 95 % | BODY MASS INDEX: 44.41 KG/M2 | HEART RATE: 89 BPM

## 2020-02-26 DIAGNOSIS — R55 SYNCOPE, UNSPECIFIED SYNCOPE TYPE: Primary | ICD-10-CM

## 2020-02-26 DIAGNOSIS — R00.0 TACHYCARDIA: ICD-10-CM

## 2020-02-26 LAB
ALBUMIN SERPL-MCNC: 5.2 G/DL (ref 3.5–5.2)
ALBUMIN/GLOB SERPL: 1.6 G/DL
ALP SERPL-CCNC: 58 U/L (ref 39–117)
ALT SERPL W P-5'-P-CCNC: 37 U/L (ref 1–33)
ANION GAP SERPL CALCULATED.3IONS-SCNC: 19 MMOL/L (ref 5–15)
AST SERPL-CCNC: 27 U/L (ref 1–32)
BASOPHILS # BLD AUTO: 0.05 10*3/MM3 (ref 0–0.2)
BASOPHILS NFR BLD AUTO: 0.4 % (ref 0–1.5)
BH CV LOWER VASCULAR LEFT COMMON FEMORAL AUGMENT: NORMAL
BH CV LOWER VASCULAR LEFT COMMON FEMORAL COMPRESS: NORMAL
BH CV LOWER VASCULAR LEFT COMMON FEMORAL PHASIC: NORMAL
BH CV LOWER VASCULAR LEFT COMMON FEMORAL SPONT: NORMAL
BH CV LOWER VASCULAR LEFT DISTAL FEMORAL AUGMENT: NORMAL
BH CV LOWER VASCULAR LEFT DISTAL FEMORAL COMPRESS: NORMAL
BH CV LOWER VASCULAR LEFT DISTAL FEMORAL PHASIC: NORMAL
BH CV LOWER VASCULAR LEFT DISTAL FEMORAL SPONT: NORMAL
BH CV LOWER VASCULAR LEFT GASTRONEMIUS COMPRESS: NORMAL
BH CV LOWER VASCULAR LEFT GREATER SAPH AK COMPRESS: NORMAL
BH CV LOWER VASCULAR LEFT GREATER SAPH BK COMPRESS: NORMAL
BH CV LOWER VASCULAR LEFT LESSER SAPH COMPRESS: NORMAL
BH CV LOWER VASCULAR LEFT MID FEMORAL AUGMENT: NORMAL
BH CV LOWER VASCULAR LEFT MID FEMORAL COMPRESS: NORMAL
BH CV LOWER VASCULAR LEFT MID FEMORAL PHASIC: NORMAL
BH CV LOWER VASCULAR LEFT MID FEMORAL SPONT: NORMAL
BH CV LOWER VASCULAR LEFT PERONEAL COMPRESS: NORMAL
BH CV LOWER VASCULAR LEFT POPLITEAL AUGMENT: NORMAL
BH CV LOWER VASCULAR LEFT POPLITEAL COMPRESS: NORMAL
BH CV LOWER VASCULAR LEFT POPLITEAL PHASIC: NORMAL
BH CV LOWER VASCULAR LEFT POPLITEAL SPONT: NORMAL
BH CV LOWER VASCULAR LEFT POSTERIOR TIBIAL COMPRESS: NORMAL
BH CV LOWER VASCULAR LEFT PROFUNDA FEMORAL COMPRESS: NORMAL
BH CV LOWER VASCULAR LEFT PROFUNDA FEMORAL PHASIC: NORMAL
BH CV LOWER VASCULAR LEFT PROFUNDA FEMORAL SPONT: NORMAL
BH CV LOWER VASCULAR LEFT PROXIMAL FEMORAL AUGMENT: NORMAL
BH CV LOWER VASCULAR LEFT PROXIMAL FEMORAL COMPRESS: NORMAL
BH CV LOWER VASCULAR LEFT PROXIMAL FEMORAL PHASIC: NORMAL
BH CV LOWER VASCULAR LEFT PROXIMAL FEMORAL SPONT: NORMAL
BH CV LOWER VASCULAR LEFT SAPHENOFEMORAL JUNCTION COMPRESS: NORMAL
BH CV LOWER VASCULAR LEFT SAPHENOFEMORAL JUNCTION PHASIC: NORMAL
BH CV LOWER VASCULAR LEFT SAPHENOFEMORAL JUNCTION SPONT: NORMAL
BH CV LOWER VASCULAR RIGHT COMMON FEMORAL AUGMENT: NORMAL
BH CV LOWER VASCULAR RIGHT COMMON FEMORAL COMPRESS: NORMAL
BH CV LOWER VASCULAR RIGHT COMMON FEMORAL PHASIC: NORMAL
BH CV LOWER VASCULAR RIGHT COMMON FEMORAL SPONT: NORMAL
BILIRUB SERPL-MCNC: 1.1 MG/DL (ref 0.2–1.2)
BUN BLD-MCNC: 13 MG/DL (ref 6–20)
BUN/CREAT SERPL: 11.9 (ref 7–25)
CALCIUM SPEC-SCNC: 10.1 MG/DL (ref 8.6–10.5)
CHLORIDE SERPL-SCNC: 90 MMOL/L (ref 98–107)
CO2 SERPL-SCNC: 27 MMOL/L (ref 22–29)
CREAT BLD-MCNC: 1.09 MG/DL (ref 0.57–1)
D DIMER PPP FEU-MCNC: 3.56 MCGFEU/ML (ref 0–0.56)
DEPRECATED RDW RBC AUTO: 39.2 FL (ref 37–54)
EOSINOPHIL # BLD AUTO: 0 10*3/MM3 (ref 0–0.4)
EOSINOPHIL NFR BLD AUTO: 0 % (ref 0.3–6.2)
ERYTHROCYTE [DISTWIDTH] IN BLOOD BY AUTOMATED COUNT: 13.1 % (ref 12.3–15.4)
GFR SERPL CREATININE-BSD FRML MDRD: 58 ML/MIN/1.73
GLOBULIN UR ELPH-MCNC: 3.3 GM/DL
GLUCOSE BLD-MCNC: 99 MG/DL (ref 65–99)
HCG INTACT+B SERPL-ACNC: <0.5 MIU/ML
HCT VFR BLD AUTO: 50 % (ref 34–46.6)
HGB BLD-MCNC: 16.8 G/DL (ref 12–15.9)
HOLD SPECIMEN: NORMAL
IMM GRANULOCYTES # BLD AUTO: 0.06 10*3/MM3 (ref 0–0.05)
IMM GRANULOCYTES NFR BLD AUTO: 0.5 % (ref 0–0.5)
LYMPHOCYTES # BLD AUTO: 2.75 10*3/MM3 (ref 0.7–3.1)
LYMPHOCYTES NFR BLD AUTO: 22 % (ref 19.6–45.3)
MCH RBC QN AUTO: 28 PG (ref 26.6–33)
MCHC RBC AUTO-ENTMCNC: 33.6 G/DL (ref 31.5–35.7)
MCV RBC AUTO: 83.3 FL (ref 79–97)
MONOCYTES # BLD AUTO: 0.83 10*3/MM3 (ref 0.1–0.9)
MONOCYTES NFR BLD AUTO: 6.6 % (ref 5–12)
NEUTROPHILS # BLD AUTO: 8.81 10*3/MM3 (ref 1.7–7)
NEUTROPHILS NFR BLD AUTO: 70.5 % (ref 42.7–76)
NRBC BLD AUTO-RTO: 0 /100 WBC (ref 0–0.2)
PLATELET # BLD AUTO: 383 10*3/MM3 (ref 140–450)
PMV BLD AUTO: 8.9 FL (ref 6–12)
POTASSIUM BLD-SCNC: 3.8 MMOL/L (ref 3.5–5.2)
PROT SERPL-MCNC: 8.5 G/DL (ref 6–8.5)
RBC # BLD AUTO: 6 10*6/MM3 (ref 3.77–5.28)
SODIUM BLD-SCNC: 136 MMOL/L (ref 136–145)
WBC NRBC COR # BLD: 12.5 10*3/MM3 (ref 3.4–10.8)
WHOLE BLOOD HOLD SPECIMEN: NORMAL
WHOLE BLOOD HOLD SPECIMEN: NORMAL

## 2020-02-26 PROCEDURE — 85379 FIBRIN DEGRADATION QUANT: CPT | Performed by: EMERGENCY MEDICINE

## 2020-02-26 PROCEDURE — 93971 EXTREMITY STUDY: CPT | Performed by: INTERNAL MEDICINE

## 2020-02-26 PROCEDURE — 84702 CHORIONIC GONADOTROPIN TEST: CPT | Performed by: EMERGENCY MEDICINE

## 2020-02-26 PROCEDURE — 85025 COMPLETE CBC W/AUTO DIFF WBC: CPT | Performed by: EMERGENCY MEDICINE

## 2020-02-26 PROCEDURE — 93005 ELECTROCARDIOGRAM TRACING: CPT | Performed by: EMERGENCY MEDICINE

## 2020-02-26 PROCEDURE — 93971 EXTREMITY STUDY: CPT

## 2020-02-26 PROCEDURE — 70450 CT HEAD/BRAIN W/O DYE: CPT

## 2020-02-26 PROCEDURE — 71275 CT ANGIOGRAPHY CHEST: CPT

## 2020-02-26 PROCEDURE — 0 IOPAMIDOL PER 1 ML: Performed by: EMERGENCY MEDICINE

## 2020-02-26 PROCEDURE — 80053 COMPREHEN METABOLIC PANEL: CPT | Performed by: EMERGENCY MEDICINE

## 2020-02-26 PROCEDURE — 99284 EMERGENCY DEPT VISIT MOD MDM: CPT

## 2020-02-26 RX ORDER — SODIUM CHLORIDE 0.9 % (FLUSH) 0.9 %
10 SYRINGE (ML) INJECTION AS NEEDED
Status: DISCONTINUED | OUTPATIENT
Start: 2020-02-26 | End: 2020-02-26 | Stop reason: HOSPADM

## 2020-02-26 RX ADMIN — SODIUM CHLORIDE, PRESERVATIVE FREE 10 ML: 5 INJECTION INTRAVENOUS at 11:35

## 2020-02-26 RX ADMIN — SODIUM CHLORIDE, PRESERVATIVE FREE 10 ML: 5 INJECTION INTRAVENOUS at 11:07

## 2020-02-26 RX ADMIN — IOPAMIDOL 85 ML: 755 INJECTION, SOLUTION INTRAVENOUS at 12:59

## 2020-02-26 RX ADMIN — SODIUM CHLORIDE 1000 ML: 9 INJECTION, SOLUTION INTRAVENOUS at 11:36

## 2020-02-28 ENCOUNTER — OFFICE VISIT (OUTPATIENT)
Dept: CARDIOLOGY | Facility: HOSPITAL | Age: 35
End: 2020-02-28

## 2020-02-28 ENCOUNTER — HOSPITAL ENCOUNTER (OUTPATIENT)
Dept: CARDIOLOGY | Facility: HOSPITAL | Age: 35
Discharge: HOME | End: 2020-02-28

## 2020-02-28 VITALS
RESPIRATION RATE: 23 BRPM | WEIGHT: 293 LBS | SYSTOLIC BLOOD PRESSURE: 153 MMHG | OXYGEN SATURATION: 96 % | DIASTOLIC BLOOD PRESSURE: 85 MMHG | BODY MASS INDEX: 44.41 KG/M2 | TEMPERATURE: 98.6 F | HEIGHT: 68 IN | HEART RATE: 92 BPM

## 2020-02-28 DIAGNOSIS — E66.01 MORBID OBESITY WITH BMI OF 45.0-49.9, ADULT (HCC): ICD-10-CM

## 2020-02-28 DIAGNOSIS — I10 ESSENTIAL HYPERTENSION: ICD-10-CM

## 2020-02-28 DIAGNOSIS — Z99.89 OSA ON CPAP: ICD-10-CM

## 2020-02-28 DIAGNOSIS — G47.33 OSA ON CPAP: ICD-10-CM

## 2020-02-28 DIAGNOSIS — R55 SYNCOPE AND COLLAPSE: ICD-10-CM

## 2020-02-28 DIAGNOSIS — R55 SYNCOPE AND COLLAPSE: Primary | ICD-10-CM

## 2020-02-28 PROCEDURE — 99214 OFFICE O/P EST MOD 30 MIN: CPT | Performed by: NURSE PRACTITIONER
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
## 2020-02-28 NOTE — PROGRESS NOTES
[facility] Heart Monitor Documentation    Dianna Caicedo  1985  [id number]  02/28/20    AVINASH Wang    [] ZIO XT Patch  Model [id number] Prescribed for N/A Days    · Serial Number: (N + 9 Digits) N   · Apply-By Date on Box:   · USPS Tracking Number:   · USPS Tracking        [] [employer] Body[employer]ian MINI PLUS Mobile Cardiac Telemetry  Model BGMINIPLUS Prescribed for 30 Days    · Serial Number: (BGM + 7 Digits) [id number]   · Shipped-By Date on Box: [id number]  · UPS Tracking Number: [id number]  · UPS Tracking      [] [employer]ian MINI Holter Monitor  Model BGMINIEL Prescribed for N/A Days    · Serial Number: (7 Digits)   · Shipped-By Date on Box:   · UPS Tracking Number: [id number]  · UPS Tracking        This monitor was applied to the patient's chest and checked for proper functioning.  Ms. Dianna HINA Caicedo was instructed in the proper use of this monitor.  She was given the opportunity to ask questions and left the office with the device 's instruction manual.    Key Benjamin MA, 2:50 PM, 02/28/20                  [facility]

## 2020-03-04 PROBLEM — R55 SYNCOPE AND COLLAPSE: Status: ACTIVE | Noted: 2020-03-04

## 2020-03-11 ENCOUNTER — HOSPITAL ENCOUNTER (OUTPATIENT)
Dept: CARDIOLOGY | Facility: HOSPITAL | Age: 35
Discharge: HOME | End: 2020-03-11
Admitting: NURSE PRACTITIONER

## 2020-03-11 DIAGNOSIS — R55 SYNCOPE AND COLLAPSE: ICD-10-CM

## 2020-03-11 LAB
BH CV ECHO MEAS - AO MAX PG (FULL): 5 MMHG
BH CV ECHO MEAS - AO MAX PG: 9 MMHG
BH CV ECHO MEAS - AO MEAN PG (FULL): 2 MMHG
BH CV ECHO MEAS - AO MEAN PG: 4 MMHG
BH CV ECHO MEAS - AO V2 MAX: 152 CM/SEC
BH CV ECHO MEAS - AO V2 MEAN: 91.4 CM/SEC
BH CV ECHO MEAS - AO V2 VTI: 29.9 CM
BH CV ECHO MEAS - BSA(HAYCOCK): 2.6 M^2
BH CV ECHO MEAS - BSA: 2.4 M^2
BH CV ECHO MEAS - BZI_BMI: 46.2 KILOGRAMS/M^2
BH CV ECHO MEAS - BZI_METRIC_HEIGHT: 172.7 CM
BH CV ECHO MEAS - BZI_METRIC_WEIGHT: 137.9 KG
BH CV ECHO MEAS - EDV(CUBED): 84 ML
BH CV ECHO MEAS - EDV(TEICH): 86.8 ML
BH CV ECHO MEAS - EF(CUBED): 85.1 %
BH CV ECHO MEAS - EF(TEICH): 78.7 %
BH CV ECHO MEAS - ESV(CUBED): 12.5 ML
BH CV ECHO MEAS - ESV(TEICH): 18.5 ML
BH CV ECHO MEAS - FS: 47 %
BH CV ECHO MEAS - IVS/LVPW: 0.89
BH CV ECHO MEAS - IVSD: 0.96 CM
BH CV ECHO MEAS - LA DIMENSION: 3.7 CM
BH CV ECHO MEAS - LAD MAJOR: 4.6 CM
BH CV ECHO MEAS - LAT PEAK E' VEL: 9.5 CM/SEC
BH CV ECHO MEAS - LATERAL E/E' RATIO: 8
BH CV ECHO MEAS - LV MASS(C)D: 149.5 GRAMS
BH CV ECHO MEAS - LV MASS(C)DI: 61.2 GRAMS/M^2
BH CV ECHO MEAS - LV MAX PG: 4 MMHG
BH CV ECHO MEAS - LV MEAN PG: 2 MMHG
BH CV ECHO MEAS - LV V1 MAX: 100 CM/SEC
BH CV ECHO MEAS - LV V1 MEAN: 64.1 CM/SEC
BH CV ECHO MEAS - LV V1 VTI: 18.9 CM
BH CV ECHO MEAS - LVIDD: 4.4 CM
BH CV ECHO MEAS - LVIDS: 2.3 CM
BH CV ECHO MEAS - LVPWD: 1.1 CM
BH CV ECHO MEAS - MV A MAX VEL: 61.7 CM/SEC
BH CV ECHO MEAS - MV DEC TIME: 0.11 SEC
BH CV ECHO MEAS - MV E MAX VEL: 76.5 CM/SEC
BH CV ECHO MEAS - MV E/A: 1.2
BH CV ECHO MEAS - PA ACC SLOPE: 594.5 CM/SEC^2
BH CV ECHO MEAS - PA ACC TIME: 0.14 SEC
BH CV ECHO MEAS - PA MAX PG: 7.2 MMHG
BH CV ECHO MEAS - PA PR(ACCEL): 14.9 MMHG
BH CV ECHO MEAS - PA V2 MAX: 134.5 CM/SEC
BH CV ECHO MEAS - RVDD: 2 CM
BH CV ECHO MEAS - SI(CUBED): 29.3 ML/M^2
BH CV ECHO MEAS - SI(TEICH): 27.9 ML/M^2
BH CV ECHO MEAS - SV(CUBED): 71.5 ML
BH CV ECHO MEAS - SV(TEICH): 68.2 ML
BH CV ECHO MEAS - TAPSE (>1.6): 2.6 CM2
BH CV ECHO MEAS - TV MAX PG: 1.6 MMHG
BH CV ECHO MEAS - TV V2 MAX: 64.2 CM/SEC
BH CV XLRA - RV BASE: 3.2 CM
BH CV XLRA - RV LENGTH: 6.1 CM
BH CV XLRA - RV MID: 2.7 CM
BH CV XLRA - TDI S': 14.1 CM/SEC

## 2020-03-11 PROCEDURE — 93306 TTE W/DOPPLER COMPLETE: CPT

## 2020-03-11 PROCEDURE — 93306 TTE W/DOPPLER COMPLETE: CPT | Performed by: INTERNAL MEDICINE

## 2020-04-01 ENCOUNTER — TELEPHONE (OUTPATIENT)
Dept: FAMILY MEDICINE CLINIC | Facility: CLINIC | Age: 35
End: 2020-04-01

## 2020-04-01 DIAGNOSIS — M53.3 COCCYX PAIN: Primary | ICD-10-CM

## 2020-04-01 NOTE — TELEPHONE ENCOUNTER
PT WANTED TO KNOW IF NIDIA NIETO COULD PUT IN AN XRAY ORDER FOR URGENT CARE AT [facility]/...SHE FELL THIS MORNING BETWEEN HER TREADMILL...FEELS LIKE SHE BROKE HER TAILBONE..SHE SAID ITS REALLY PAINFUL WHEN SHE SITS; PLEASE ADVISE    LYDIA: [phone]

## 2020-04-01 NOTE — TELEPHONE ENCOUNTER
I can put an order in for an x-ray but they may not do it  unless she goes through the urgent care provider.

## 2020-04-04 ENCOUNTER — HOSPITAL ENCOUNTER (OUTPATIENT)
Dept: GENERAL RADIOLOGY | Facility: HOSPITAL | Age: 35
Discharge: HOME | End: 2020-04-04
Admitting: FAMILY MEDICINE

## 2020-04-04 DIAGNOSIS — M53.3 COCCYX PAIN: ICD-10-CM

## 2020-04-04 PROCEDURE — 72220 X-RAY EXAM SACRUM TAILBONE: CPT

## 2020-04-07 PROCEDURE — 93228 REMOTE 30 DAY ECG REV/REPORT: CPT | Performed by: INTERNAL MEDICINE
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
## 2020-04-10 ENCOUNTER — TELEMEDICINE (OUTPATIENT)
Dept: CARDIOLOGY | Facility: HOSPITAL | Age: 35
End: 2020-04-10

## 2020-04-10 VITALS
BODY MASS INDEX: 44.41 KG/M2 | WEIGHT: 293 LBS | HEART RATE: 81 BPM | DIASTOLIC BLOOD PRESSURE: 90 MMHG | HEIGHT: 68 IN | SYSTOLIC BLOOD PRESSURE: 152 MMHG

## 2020-04-10 DIAGNOSIS — I10 ESSENTIAL HYPERTENSION: ICD-10-CM

## 2020-04-10 DIAGNOSIS — E66.01 MORBID OBESITY WITH BMI OF 45.0-49.9, ADULT (HCC): ICD-10-CM

## 2020-04-10 DIAGNOSIS — R55 SYNCOPE AND COLLAPSE: Primary | ICD-10-CM

## 2020-04-10 PROCEDURE — 99213 OFFICE O/P EST LOW 20 MIN: CPT | Performed by: NURSE PRACTITIONER

## 2020-04-10 RX ORDER — METOPROLOL SUCCINATE 25 MG/1
25 TABLET, EXTENDED RELEASE ORAL DAILY
Qty: 30 TABLET | Refills: 3 | Status: SHIPPED | OUTPATIENT
Start: 2020-04-10 | End: 2020-06-30 | Stop reason: SDUPTHER

## 2020-04-10 RX ORDER — VENLAFAXINE HYDROCHLORIDE 75 MG/1
75 CAPSULE, EXTENDED RELEASE ORAL DAILY
COMMUNITY
Start: 2020-03-11 | End: 2020-07-07 | Stop reason: SDUPTHER

## 2020-04-10 NOTE — PROGRESS NOTES
You have chosen to receive care through the use of telemedicine. Telemedicine enables health care providers at different locations to provide safe, effective, and convenient care through the use of technology. As with any health care service, there are risks associated with the use of telemedicine, including equipment failure, poor connections, and  issues.    • Do you understand the risks and benefits of telemedicine as I have explained them to you? Yes  • Have your questions regarding telemedicine been answered? Yes  • Do you consent to the use of telemedicine in your medical care today? Yes

## 2020-05-11 ENCOUNTER — OFFICE VISIT (OUTPATIENT)
Dept: ORTHOPEDIC SURGERY | Facility: CLINIC | Age: 35
End: 2020-05-11

## 2020-05-11 VITALS — WEIGHT: 293 LBS | BODY MASS INDEX: 44.41 KG/M2 | HEIGHT: 68 IN | OXYGEN SATURATION: 98 % | HEART RATE: 102 BPM

## 2020-05-11 DIAGNOSIS — M17.5 OTHER SECONDARY OSTEOARTHRITIS OF RIGHT KNEE: Primary | ICD-10-CM

## 2020-05-11 PROCEDURE — 20610 DRAIN/INJ JOINT/BURSA W/O US: CPT | Performed by: ORTHOPAEDIC SURGERY

## 2020-05-11 RX ORDER — TRIAMCINOLONE ACETONIDE 40 MG/ML
40 INJECTION, SUSPENSION INTRA-ARTICULAR; INTRAMUSCULAR
Status: COMPLETED | OUTPATIENT
Start: 2020-05-11 | End: 2020-05-11

## 2020-05-11 RX ORDER — ROPIVACAINE HYDROCHLORIDE 5 MG/ML
4 INJECTION, SOLUTION EPIDURAL; INFILTRATION; PERINEURAL
Status: COMPLETED | OUTPATIENT
Start: 2020-05-11 | End: 2020-05-11

## 2020-05-11 RX ADMIN — TRIAMCINOLONE ACETONIDE 40 MG: 40 INJECTION, SUSPENSION INTRA-ARTICULAR; INTRAMUSCULAR at 13:56

## 2020-05-11 RX ADMIN — ROPIVACAINE HYDROCHLORIDE 4 ML: 5 INJECTION, SOLUTION EPIDURAL; INFILTRATION; PERINEURAL at 13:56

## 2020-05-11 NOTE — PROGRESS NOTES
Procedure   Large Joint Arthrocentesis: R knee  Date/Time: 5/11/2020 1:56 PM  Consent given by: patient  Site marked: site marked  Timeout: Immediately prior to procedure a time out was called to verify the correct patient, procedure, equipment, support staff and site/side marked as required   Supporting Documentation  Indications: pain   Procedure Details  Location: knee - R knee  Preparation: Patient was prepped and draped in the usual sterile fashion  Needle size: 22 G  Approach: anterolateral  Medications administered: 40 mg triamcinolone acetonide 40 MG/ML; 4 mL ropivacaine 0.5 %  Patient tolerance: patient tolerated the procedure well with no immediate complications
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
## 2020-05-18 ENCOUNTER — OFFICE VISIT (OUTPATIENT)
Dept: FAMILY MEDICINE CLINIC | Facility: CLINIC | Age: 35
End: 2020-05-18

## 2020-05-18 VITALS
OXYGEN SATURATION: 98 % | TEMPERATURE: 97.7 F | BODY MASS INDEX: 44.41 KG/M2 | SYSTOLIC BLOOD PRESSURE: 144 MMHG | RESPIRATION RATE: 16 BRPM | HEIGHT: 68 IN | WEIGHT: 293 LBS | HEART RATE: 86 BPM | DIASTOLIC BLOOD PRESSURE: 90 MMHG

## 2020-05-18 DIAGNOSIS — R10.13 MIDEPIGASTRIC PAIN: Primary | ICD-10-CM

## 2020-05-18 DIAGNOSIS — E03.9 ACQUIRED HYPOTHYROIDISM: ICD-10-CM

## 2020-05-18 PROCEDURE — 99214 OFFICE O/P EST MOD 30 MIN: CPT | Performed by: PHYSICIAN ASSISTANT

## 2020-05-18 RX ORDER — PANTOPRAZOLE SODIUM 40 MG/1
40 TABLET, DELAYED RELEASE ORAL DAILY
Qty: 30 TABLET | Refills: 11 | Status: SHIPPED | OUTPATIENT
Start: 2020-05-18 | End: 2020-07-07 | Stop reason: SDUPTHER

## 2020-05-18 NOTE — PROGRESS NOTES
Subjective   Dianna Caicedo is a 35 y.o. female  Abdominal Pain (Midepigastric pain, worse when sitting, bloating x1 week,) and Hot Flashes      History of Present Illness  White female who comes with epigastric pain bloating x1 week pain radiating to back patient has slight nausea upset stomach worse after eating heavy meals patient states she feels uncomfortable when she stands up epigastric pain radiates to the back she is slight nausea no fever no chills no nausea vomiting patient states feels full like a rubber band around the midsection.  Patient has a history of hypothyroidism needs labs  The following portions of the patient's history were reviewed and updated as appropriate: allergies, current medications, past social history and problem list    Review of Systems   Constitutional: Negative for fatigue and unexpected weight change.   Respiratory: Negative for cough, chest tightness and shortness of breath.    Cardiovascular: Negative for chest pain, palpitations and leg swelling.   Gastrointestinal: Positive for abdominal pain and nausea.   Skin: Negative for color change and rash.   Neurological: Negative for dizziness, syncope, weakness and headaches.       Objective     Vitals:    05/18/20 1521   BP: 144/90   Pulse: 86   Resp: 16   Temp: 97.7 °F (36.5 °C)   SpO2: 98%       Physical Exam   Constitutional: She appears well-developed and well-nourished.   Neck: No JVD present.   Cardiovascular: Normal rate, regular rhythm, normal heart sounds, intact distal pulses and normal pulses.   No murmur heard.  Pulmonary/Chest: Effort normal and breath sounds normal. No respiratory distress.   Abdominal: Soft. Bowel sounds are normal. There is no hepatosplenomegaly. There is tenderness in the epigastric area.   Musculoskeletal: She exhibits no edema.   Skin: Skin is warm and dry.   Nursing note and vitals reviewed.      Assessment/Plan     Dianna was seen today for abdominal pain and hot flashes.    Diagnoses and all  orders for this visit:    Midepigastric pain  -     Amylase  -     Lipase  -     US Gallbladder; Future  -     Helicobacter Pylori, IgA IgG IgM  -     Comprehensive Metabolic Panel  -     pantoprazole (Protonix) 40 MG EC tablet; Take 1 tablet by mouth Daily.    Acquired hypothyroidism  -     TSH; Future       Answers for HPI/ROS submitted by the patient on 5/18/2020   Abdominal pain  What is the primary reason for your visit?: Abdominal Pain  Follow-up if no better resolving      Follow-up after testing

## 2020-05-19 ENCOUNTER — LAB (OUTPATIENT)
Dept: LAB | Facility: HOSPITAL | Age: 35
End: 2020-05-19

## 2020-05-19 DIAGNOSIS — E03.9 ACQUIRED HYPOTHYROIDISM: ICD-10-CM

## 2020-05-19 LAB
ALBUMIN SERPL-MCNC: 3.9 G/DL (ref 3.5–5.2)
ALBUMIN/GLOB SERPL: 1.4 G/DL
ALP SERPL-CCNC: 48 U/L (ref 39–117)
ALT SERPL W P-5'-P-CCNC: 25 U/L (ref 1–33)
AMYLASE SERPL-CCNC: 60 U/L (ref 28–100)
ANION GAP SERPL CALCULATED.3IONS-SCNC: 14.7 MMOL/L (ref 5–15)
AST SERPL-CCNC: 21 U/L (ref 1–32)
BILIRUB SERPL-MCNC: 0.4 MG/DL (ref 0.2–1.2)
BUN BLD-MCNC: 12 MG/DL (ref 6–20)
BUN/CREAT SERPL: 10.7 (ref 7–25)
CALCIUM SPEC-SCNC: 9.1 MG/DL (ref 8.6–10.5)
CHLORIDE SERPL-SCNC: 97 MMOL/L (ref 98–107)
CO2 SERPL-SCNC: 27.3 MMOL/L (ref 22–29)
CREAT BLD-MCNC: 1.12 MG/DL (ref 0.57–1)
GFR SERPL CREATININE-BSD FRML MDRD: 56 ML/MIN/1.73
GLOBULIN UR ELPH-MCNC: 2.7 GM/DL
GLUCOSE BLD-MCNC: 104 MG/DL (ref 65–99)
LIPASE SERPL-CCNC: 39 U/L (ref 13–60)
POTASSIUM BLD-SCNC: 4.1 MMOL/L (ref 3.5–5.2)
PROT SERPL-MCNC: 6.6 G/DL (ref 6–8.5)
SODIUM BLD-SCNC: 139 MMOL/L (ref 136–145)
TSH SERPL DL<=0.05 MIU/L-ACNC: 3.46 UIU/ML (ref 0.27–4.2)

## 2020-05-19 PROCEDURE — 82150 ASSAY OF AMYLASE: CPT | Performed by: PHYSICIAN ASSISTANT

## 2020-05-19 PROCEDURE — 36415 COLL VENOUS BLD VENIPUNCTURE: CPT | Performed by: PHYSICIAN ASSISTANT

## 2020-05-19 PROCEDURE — 80053 COMPREHEN METABOLIC PANEL: CPT | Performed by: PHYSICIAN ASSISTANT

## 2020-05-19 PROCEDURE — 86677 HELICOBACTER PYLORI ANTIBODY: CPT | Performed by: PHYSICIAN ASSISTANT

## 2020-05-19 PROCEDURE — 83690 ASSAY OF LIPASE: CPT | Performed by: PHYSICIAN ASSISTANT

## 2020-05-19 PROCEDURE — 84443 ASSAY THYROID STIM HORMONE: CPT

## 2020-05-21 ENCOUNTER — HOSPITAL ENCOUNTER (OUTPATIENT)
Dept: ULTRASOUND IMAGING | Facility: HOSPITAL | Age: 35
Discharge: HOME | End: 2020-05-21
Admitting: PHYSICIAN ASSISTANT

## 2020-05-21 DIAGNOSIS — R10.13 MIDEPIGASTRIC PAIN: ICD-10-CM

## 2020-05-21 LAB
H PYLORI IGA SER IA-ACNC: <9 UNITS (ref 0–8.9)
H PYLORI IGG SER IA-ACNC: 0.4 INDEX VALUE (ref 0–0.79)
H PYLORI IGM SER-ACNC: <9 UNITS (ref 0–8.9)

## 2020-05-21 PROCEDURE — 76705 ECHO EXAM OF ABDOMEN: CPT

## 2020-05-23 ENCOUNTER — APPOINTMENT (OUTPATIENT)
Dept: ULTRASOUND IMAGING | Facility: HOSPITAL | Age: 35
End: 2020-05-23

## 2020-05-28 ENCOUNTER — APPOINTMENT (OUTPATIENT)
Dept: ULTRASOUND IMAGING | Facility: HOSPITAL | Age: 35
End: 2020-05-28

## 2020-06-12 ENCOUNTER — TELEPHONE (OUTPATIENT)
Dept: FAMILY MEDICINE CLINIC | Facility: CLINIC | Age: 35
End: 2020-06-12

## 2020-06-12 ENCOUNTER — PATIENT MESSAGE (OUTPATIENT)
Dept: FAMILY MEDICINE CLINIC | Facility: CLINIC | Age: 35
End: 2020-06-12

## 2020-06-12 NOTE — TELEPHONE ENCOUNTER
----- Message from Dianna Caicedo sent at 6/12/2020  1:51 PM EDT -----  Regarding: Test Results Question  Contact: [phone]  I would assume the results have came in and nothing to worry about since I never received a call. At this point the pain comes and goes, do I need to do anything going forward?

## 2020-06-15 NOTE — TELEPHONE ENCOUNTER
Her ultrasound did show a fatty liver but no gallstones, she still having problems we need do a HIDA scan sorry I did not tell her on Friday but I was having  My own gallbladder surgery ironically

## 2020-06-30 DIAGNOSIS — I10 ESSENTIAL HYPERTENSION: ICD-10-CM

## 2020-06-30 RX ORDER — HYDROCHLOROTHIAZIDE 25 MG/1
25 TABLET ORAL 2 TIMES DAILY
Qty: 180 TABLET | Refills: 1 | Status: CANCELLED | OUTPATIENT
Start: 2020-06-30

## 2020-06-30 RX ORDER — LISINOPRIL 40 MG/1
40 TABLET ORAL DAILY
Qty: 90 TABLET | Refills: 0 | Status: CANCELLED | OUTPATIENT
Start: 2020-06-30

## 2020-06-30 RX ORDER — METOPROLOL SUCCINATE 25 MG/1
25 TABLET, EXTENDED RELEASE ORAL DAILY
Qty: 90 TABLET | Refills: 2 | Status: SHIPPED | OUTPATIENT
Start: 2020-06-30 | End: 2020-12-07 | Stop reason: SDUPTHER

## 2020-06-30 RX ORDER — IBUPROFEN 800 MG/1
800 TABLET ORAL EVERY 6 HOURS PRN
Qty: 90 TABLET | Refills: 5 | Status: CANCELLED | OUTPATIENT
Start: 2020-06-30

## 2020-06-30 RX ORDER — LEVOTHYROXINE SODIUM 0.2 MG/1
200 TABLET ORAL DAILY
Qty: 90 TABLET | Refills: 0 | Status: CANCELLED | OUTPATIENT
Start: 2020-06-30

## 2020-07-07 ENCOUNTER — TELEMEDICINE (OUTPATIENT)
Dept: FAMILY MEDICINE CLINIC | Facility: CLINIC | Age: 35
End: 2020-07-07

## 2020-07-07 DIAGNOSIS — R10.13 MIDEPIGASTRIC PAIN: ICD-10-CM

## 2020-07-07 DIAGNOSIS — I10 ESSENTIAL HYPERTENSION: ICD-10-CM

## 2020-07-07 DIAGNOSIS — E03.9 ACQUIRED HYPOTHYROIDISM: ICD-10-CM

## 2020-07-07 DIAGNOSIS — R19.7 DIARRHEA, UNSPECIFIED TYPE: ICD-10-CM

## 2020-07-07 DIAGNOSIS — R11.2 NAUSEA AND VOMITING, INTRACTABILITY OF VOMITING NOT SPECIFIED, UNSPECIFIED VOMITING TYPE: Primary | ICD-10-CM

## 2020-07-07 DIAGNOSIS — F32.A DEPRESSIVE DISORDER: ICD-10-CM

## 2020-07-07 PROCEDURE — 99214 OFFICE O/P EST MOD 30 MIN: CPT | Performed by: PHYSICIAN ASSISTANT

## 2020-07-07 RX ORDER — LEVOTHYROXINE SODIUM 0.2 MG/1
200 TABLET ORAL DAILY
Qty: 90 TABLET | Refills: 3 | Status: SHIPPED | OUTPATIENT
Start: 2020-07-07 | End: 2021-12-10

## 2020-07-07 RX ORDER — LISINOPRIL 40 MG/1
40 TABLET ORAL DAILY
Qty: 90 TABLET | Refills: 1 | Status: SHIPPED | OUTPATIENT
Start: 2020-07-07 | End: 2021-08-09

## 2020-07-07 RX ORDER — VENLAFAXINE HYDROCHLORIDE 75 MG/1
75 CAPSULE, EXTENDED RELEASE ORAL DAILY
Qty: 90 CAPSULE | Refills: 3 | Status: SHIPPED | OUTPATIENT
Start: 2020-07-07 | End: 2020-11-02

## 2020-07-07 RX ORDER — VENLAFAXINE HYDROCHLORIDE 75 MG/1
75 CAPSULE, EXTENDED RELEASE ORAL DAILY
Qty: 30 CAPSULE | Refills: 11 | Status: SHIPPED | OUTPATIENT
Start: 2020-07-07 | End: 2020-07-07 | Stop reason: SDUPTHER

## 2020-07-07 RX ORDER — HYDROCHLOROTHIAZIDE 25 MG/1
25 TABLET ORAL 2 TIMES DAILY
Qty: 180 TABLET | Refills: 3 | Status: SHIPPED | OUTPATIENT
Start: 2020-07-07 | End: 2021-08-09

## 2020-07-07 RX ORDER — PANTOPRAZOLE SODIUM 40 MG/1
40 TABLET, DELAYED RELEASE ORAL DAILY
Qty: 90 TABLET | Refills: 3 | Status: SHIPPED | OUTPATIENT
Start: 2020-07-07 | End: 2022-11-14

## 2020-07-07 NOTE — PROGRESS NOTES
Subjective   Dianna Caicedo is a 35 y.o. female  Med Refill      History of Present Illness  Patient is a pleasant 45-year-old white female presents today via video visit after giving her consent for this to be her office visit.  She is on hypertension, depressive disorder, hypothyroidism as well as persistent midepigastric sherry pain with intermittent nausea and vomiting, occasional diarrhea.  PCPs office notes, patient had ultrasound done showing fatty liver but no abnormality in gallbladder.  She is concerned regarding her continued symptoms after eating.  She is taking pantoprazole continue to have symptoms of indigestion.  She was changed from Zoloft to Effexor by her gynecologist Dr. Barroso due to decreased libido, currently feels the depression is stable on his medication will continue this and stop Zoloft and have her side effects of decreased below.  Video visit length 20 minutes.  The following portions of the patient's history were reviewed and updated as appropriate: allergies, current medications, past social history and problem list    Review of Systems   Constitutional: Negative for appetite change, fatigue and unexpected weight change.   Eyes: Negative for visual disturbance.   Respiratory: Negative for cough, chest tightness and shortness of breath.    Cardiovascular: Negative for chest pain, palpitations and leg swelling.   Gastrointestinal: Positive for abdominal distention, abdominal pain, diarrhea, nausea and vomiting. Negative for constipation.   Endocrine: Negative for cold intolerance and heat intolerance.   Skin: Negative for color change and rash.   Neurological: Negative for dizziness, tremors, syncope, weakness, light-headedness and headaches.   Psychiatric/Behavioral: Negative for agitation, behavioral problems, confusion, decreased concentration, dysphoric mood, sleep disturbance and suicidal ideas. The patient is not nervous/anxious.        Objective     There were no vitals filed for  this visit.    Physical Exam   Constitutional: She is oriented to person, place, and time. She appears well-developed and well-nourished.  Non-toxic appearance. She does not have a sickly appearance. She does not appear ill. No distress.   Obesity noted     Eyes: No scleral icterus.   Neck: No thyromegaly present.   Cardiovascular: Normal rate.   Pulmonary/Chest: Effort normal. No respiratory distress.   Abdominal:   Patient denies abdominal pain at this time states she has a sensation of advanced wheezing around her upper abdomen when she eats.   Neurological: She is alert and oriented to person, place, and time. No cranial nerve deficit.   Skin: No rash noted. She is not diaphoretic. No erythema. No pallor.   Psychiatric: She has a normal mood and affect. Her behavior is normal. Judgment and thought content normal.       Assessment/Plan     Dianna was seen today for med refill.    Diagnoses and all orders for this visit:    Nausea and vomiting, intractability of vomiting not specified, unspecified vomiting type  -     NM HIDA Scan With Pharmacological Intervention; Future    Midepigastric pain  -     pantoprazole (Protonix) 40 MG EC tablet; Take 1 tablet by mouth Daily.  -     NM HIDA Scan With Pharmacological Intervention; Future    Essential hypertension  -     hydroCHLOROthiazide (HYDRODIURIL) 25 MG tablet; Take 1 tablet by mouth 2 (Two) Times a Day.    Diarrhea, unspecified type  -     NM HIDA Scan With Pharmacological Intervention; Future    Depressive disorder    Acquired hypothyroidism    Other orders  -     Discontinue: venlafaxine XR (EFFEXOR-XR) 75 MG 24 hr capsule; Take 1 capsule by mouth Daily.  -     venlafaxine XR (EFFEXOR-XR) 75 MG 24 hr capsule; Take 1 capsule by mouth Daily.  -     lisinopril (PRINIVIL,ZESTRIL) 40 MG tablet; Take 1 tablet by mouth Daily.  -     levothyroxine (Synthroid) 200 MCG tablet; Take 1 tablet by mouth Daily.

## 2020-08-04 ENCOUNTER — HOSPITAL ENCOUNTER (OUTPATIENT)
Dept: NUCLEAR MEDICINE | Facility: HOSPITAL | Age: 35
Discharge: HOME | End: 2020-08-04

## 2020-08-04 VITALS — WEIGHT: 293 LBS | BODY MASS INDEX: 46.84 KG/M2

## 2020-08-04 DIAGNOSIS — R10.13 MIDEPIGASTRIC PAIN: ICD-10-CM

## 2020-08-04 DIAGNOSIS — R19.7 DIARRHEA, UNSPECIFIED TYPE: ICD-10-CM

## 2020-08-04 DIAGNOSIS — R11.2 NAUSEA AND VOMITING, INTRACTABILITY OF VOMITING NOT SPECIFIED, UNSPECIFIED VOMITING TYPE: ICD-10-CM

## 2020-08-04 PROCEDURE — 78227 HEPATOBIL SYST IMAGE W/DRUG: CPT

## 2020-08-04 PROCEDURE — A9537 TC99M MEBROFENIN: HCPCS | Performed by: PHYSICIAN ASSISTANT

## 2020-08-04 PROCEDURE — 0 TECHNETIUM TC 99M MEBROFENIN KIT: Performed by: PHYSICIAN ASSISTANT

## 2020-08-04 PROCEDURE — 25010000002 SINCALIDE PER 5 MCG: Performed by: PHYSICIAN ASSISTANT

## 2020-08-04 RX ORDER — KIT FOR THE PREPARATION OF TECHNETIUM TC 99M MEBROFENIN 45 MG/10ML
1 INJECTION, POWDER, LYOPHILIZED, FOR SOLUTION INTRAVENOUS
Status: COMPLETED | OUTPATIENT
Start: 2020-08-04 | End: 2020-08-04

## 2020-08-04 RX ADMIN — MEBROFENIN 1 DOSE: 45 INJECTION, POWDER, LYOPHILIZED, FOR SOLUTION INTRAVENOUS at 08:24

## 2020-08-04 RX ADMIN — SINCALIDE 2.8 MCG: 5 INJECTION, POWDER, LYOPHILIZED, FOR SOLUTION INTRAVENOUS at 09:13

## 2020-08-11 ENCOUNTER — TRANSCRIBE ORDERS (OUTPATIENT)
Dept: FAMILY MEDICINE CLINIC | Facility: CLINIC | Age: 35
End: 2020-08-11

## 2020-08-11 DIAGNOSIS — R10.31 RIGHT LOWER QUADRANT ABDOMINAL PAIN: Primary | ICD-10-CM

## 2020-08-21 ENCOUNTER — TELEMEDICINE (OUTPATIENT)
Dept: GASTROENTEROLOGY | Facility: CLINIC | Age: 35
End: 2020-08-21

## 2020-08-21 DIAGNOSIS — R10.9 CHRONIC ABDOMINAL PAIN: Primary | ICD-10-CM

## 2020-08-21 DIAGNOSIS — R19.7 DIARRHEA, UNSPECIFIED TYPE: ICD-10-CM

## 2020-08-21 DIAGNOSIS — G89.29 CHRONIC ABDOMINAL PAIN: Primary | ICD-10-CM

## 2020-08-21 DIAGNOSIS — K76.0 FATTY LIVER: ICD-10-CM

## 2020-08-21 PROCEDURE — 99204 OFFICE O/P NEW MOD 45 MIN: CPT | Performed by: INTERNAL MEDICINE
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
## 2020-09-11 ENCOUNTER — TELEPHONE (OUTPATIENT)
Dept: FAMILY MEDICINE CLINIC | Facility: CLINIC | Age: 35
End: 2020-09-11

## 2020-09-14 ENCOUNTER — OFFICE VISIT (OUTPATIENT)
Dept: ORTHOPEDIC SURGERY | Facility: CLINIC | Age: 35
End: 2020-09-14

## 2020-09-14 VITALS — WEIGHT: 293 LBS | OXYGEN SATURATION: 98 % | BODY MASS INDEX: 44.41 KG/M2 | HEIGHT: 68 IN | HEART RATE: 96 BPM

## 2020-09-14 DIAGNOSIS — M17.5 OTHER SECONDARY OSTEOARTHRITIS OF RIGHT KNEE: Primary | ICD-10-CM

## 2020-09-14 DIAGNOSIS — M79.671 RIGHT FOOT PAIN: ICD-10-CM

## 2020-09-14 PROCEDURE — 99214 OFFICE O/P EST MOD 30 MIN: CPT | Performed by: ORTHOPAEDIC SURGERY

## 2020-09-14 PROCEDURE — 20610 DRAIN/INJ JOINT/BURSA W/O US: CPT | Performed by: ORTHOPAEDIC SURGERY

## 2020-09-14 RX ORDER — ROPIVACAINE HYDROCHLORIDE 5 MG/ML
4 INJECTION, SOLUTION EPIDURAL; INFILTRATION; PERINEURAL
Status: COMPLETED | OUTPATIENT
Start: 2020-09-14 | End: 2020-09-14

## 2020-09-14 RX ORDER — TRIAMCINOLONE ACETONIDE 40 MG/ML
40 INJECTION, SUSPENSION INTRA-ARTICULAR; INTRAMUSCULAR
Status: COMPLETED | OUTPATIENT
Start: 2020-09-14 | End: 2020-09-14

## 2020-09-14 RX ADMIN — ROPIVACAINE HYDROCHLORIDE 4 ML: 5 INJECTION, SOLUTION EPIDURAL; INFILTRATION; PERINEURAL at 08:57

## 2020-09-14 RX ADMIN — TRIAMCINOLONE ACETONIDE 40 MG: 40 INJECTION, SUSPENSION INTRA-ARTICULAR; INTRAMUSCULAR at 08:57

## 2020-09-14 NOTE — PROGRESS NOTES
Procedure   Large Joint Arthrocentesis: R knee  Date/Time: 9/14/2020 8:57 AM  Consent given by: patient  Site marked: site marked  Timeout: Immediately prior to procedure a time out was called to verify the correct patient, procedure, equipment, support staff and site/side marked as required   Supporting Documentation  Indications: pain   Procedure Details  Location: knee - R knee  Preparation: Patient was prepped and draped in the usual sterile fashion  Needle size: 22 G  Approach: anterolateral  Medications administered: 40 mg triamcinolone acetonide 40 MG/ML; 4 mL ropivacaine 0.5 %  Patient tolerance: patient tolerated the procedure well with no immediate complications
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
## 2020-09-28 ENCOUNTER — OFFICE VISIT (OUTPATIENT)
Dept: ORTHOPEDIC SURGERY | Facility: CLINIC | Age: 35
End: 2020-09-28

## 2020-09-28 VITALS — BODY MASS INDEX: 44.41 KG/M2 | HEART RATE: 83 BPM | OXYGEN SATURATION: 99 % | HEIGHT: 68 IN | WEIGHT: 293 LBS

## 2020-09-28 DIAGNOSIS — E66.01 OBESITY, MORBID, BMI 40.0-49.9 (HCC): ICD-10-CM

## 2020-09-28 DIAGNOSIS — M79.671 RIGHT FOOT PAIN: Primary | ICD-10-CM

## 2020-09-28 PROCEDURE — 99213 OFFICE O/P EST LOW 20 MIN: CPT | Performed by: ORTHOPAEDIC SURGERY
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
## 2020-10-18 ENCOUNTER — HOSPITAL ENCOUNTER (OUTPATIENT)
Dept: MRI IMAGING | Facility: HOSPITAL | Age: 35
Discharge: HOME | End: 2020-10-18
Admitting: ORTHOPAEDIC SURGERY

## 2020-10-18 DIAGNOSIS — M79.671 RIGHT FOOT PAIN: ICD-10-CM

## 2020-10-18 PROCEDURE — 73718 MRI LOWER EXTREMITY W/O DYE: CPT

## 2020-10-30 ENCOUNTER — TELEPHONE (OUTPATIENT)
Dept: FAMILY MEDICINE CLINIC | Facility: CLINIC | Age: 35
End: 2020-10-30

## 2020-10-30 NOTE — TELEPHONE ENCOUNTER
Patient to schedule appointment for evaluation if she feels that she needs medication for her respiratory symptoms

## 2020-10-30 NOTE — TELEPHONE ENCOUNTER
----- Message from Dianna Caicedo sent at 10/29/2020  3:49 PM EDT -----  Regarding: Non-Urgent Medical Question  Contact: [phone]  Currently currently in quarantine because my  tested positive for COVID-19. I’ve been tested for the third time but my results won’t be back until tomorrow. I feel like I have only the symptoms of bronchitis can I possibly get a prescription just for that or would I need to see you with a negative result obviously from my Covid test.

## 2020-11-02 ENCOUNTER — OFFICE VISIT (OUTPATIENT)
Dept: ORTHOPEDIC SURGERY | Facility: CLINIC | Age: 35
End: 2020-11-02

## 2020-11-02 DIAGNOSIS — M79.671 RIGHT FOOT PAIN: Primary | ICD-10-CM

## 2020-11-02 PROCEDURE — 99212 OFFICE O/P EST SF 10 MIN: CPT | Performed by: ORTHOPAEDIC SURGERY
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
## 2020-11-04 ENCOUNTER — TELEPHONE (OUTPATIENT)
Dept: FAMILY MEDICINE CLINIC | Facility: CLINIC | Age: 35
End: 2020-11-04

## 2020-11-04 NOTE — TELEPHONE ENCOUNTER
PATIENT CALLED AND STATED SHE HAS COVID. PATIENT STATED SHE IS HAVING A LOT OF PRESSURE BEHIND HER EYES. WANTED TO KNOW IF THERE WAS ANYTHING SHE COULD TAKE OTHER THEN IBUPROFEN AND TYLENOL. THEY ARE NOT HELPING WITH THE PAIN     PLEASE CALL AND ADVISE -[phone] (H)

## 2020-12-07 DIAGNOSIS — I10 ESSENTIAL HYPERTENSION: ICD-10-CM

## 2020-12-07 RX ORDER — METOPROLOL SUCCINATE 25 MG/1
25 TABLET, EXTENDED RELEASE ORAL DAILY
Qty: 90 TABLET | Refills: 1 | Status: SHIPPED | OUTPATIENT
Start: 2020-12-07 | End: 2021-12-10 | Stop reason: SDUPTHER

## 2020-12-07 NOTE — TELEPHONE ENCOUNTER
Patient states mail order is having a hard time getting Metoprolol in and needs this sent to her local pharm

## 2020-12-30 ENCOUNTER — TELEPHONE (OUTPATIENT)
Dept: FAMILY MEDICINE CLINIC | Facility: CLINIC | Age: 35
End: 2020-12-30

## 2020-12-30 ENCOUNTER — TRANSCRIBE ORDERS (OUTPATIENT)
Dept: FAMILY MEDICINE CLINIC | Facility: CLINIC | Age: 35
End: 2020-12-30

## 2020-12-30 DIAGNOSIS — R20.0 BILATERAL HAND NUMBNESS: ICD-10-CM

## 2020-12-30 DIAGNOSIS — M79.642 BILATERAL HAND PAIN: Primary | ICD-10-CM

## 2020-12-30 DIAGNOSIS — M79.641 BILATERAL HAND PAIN: Primary | ICD-10-CM

## 2021-01-25 ENCOUNTER — TELEPHONE (OUTPATIENT)
Dept: FAMILY MEDICINE CLINIC | Facility: CLINIC | Age: 36
End: 2021-01-25

## 2021-01-25 NOTE — TELEPHONE ENCOUNTER
PT. IS WANTING A REFERRAL FOR [facility] FOR BOIL UNDER ARMPITS.  NEEDING THIS RENEWED; OFFICE IS ATTEMPTING TO GET HER IN TOMORROW.  PHONE # FOR ABOVE IS: [phone].    PT. CAN BE REACHED @ [phone].

## 2021-02-02 ENCOUNTER — TELEPHONE (OUTPATIENT)
Dept: FAMILY MEDICINE CLINIC | Facility: CLINIC | Age: 36
End: 2021-02-02

## 2021-02-02 ENCOUNTER — TRANSCRIBE ORDERS (OUTPATIENT)
Dept: FAMILY MEDICINE CLINIC | Facility: CLINIC | Age: 36
End: 2021-02-02

## 2021-02-02 ENCOUNTER — HOSPITAL ENCOUNTER (OUTPATIENT)
Dept: NEUROLOGY | Facility: HOSPITAL | Age: 36
Discharge: HOME | End: 2021-02-02
Admitting: PHYSICIAN ASSISTANT

## 2021-02-02 DIAGNOSIS — G56.03 BILATERAL CARPAL TUNNEL SYNDROME: Primary | ICD-10-CM

## 2021-02-02 PROCEDURE — 95886 MUSC TEST DONE W/N TEST COMP: CPT

## 2021-02-02 PROCEDURE — 95910 NRV CNDJ TEST 7-8 STUDIES: CPT

## 2021-03-22 ENCOUNTER — OFFICE VISIT (OUTPATIENT)
Dept: OBSTETRICS AND GYNECOLOGY | Facility: CLINIC | Age: 36
End: 2021-03-22

## 2021-03-22 VITALS
HEIGHT: 68 IN | WEIGHT: 293 LBS | DIASTOLIC BLOOD PRESSURE: 84 MMHG | BODY MASS INDEX: 44.41 KG/M2 | SYSTOLIC BLOOD PRESSURE: 122 MMHG

## 2021-03-22 DIAGNOSIS — Z01.419 PAP TEST, AS PART OF ROUTINE GYNECOLOGICAL EXAMINATION: Primary | ICD-10-CM

## 2021-03-22 PROBLEM — Z00.00 ANNUAL PHYSICAL EXAM: Status: ACTIVE | Noted: 2021-03-22

## 2021-03-22 PROCEDURE — 99395 PREV VISIT EST AGE 18-39: CPT | Performed by: OBSTETRICS & GYNECOLOGY

## 2021-03-22 RX ORDER — SERTRALINE HYDROCHLORIDE 100 MG/1
100 TABLET, FILM COATED ORAL DAILY
COMMUNITY
End: 2021-12-10 | Stop reason: SDUPTHER

## 2021-03-22 NOTE — PROGRESS NOTES
GYN Annual Exam     CC - Here for annual exam.     Subjective   HPI  Dianna Caicedo is a 36 y.o. female, , who presents for annual well woman exam.   Patient's last menstrual period was 2020.  Periods are regular every 28-30 days, lasting 5 days.  Dysmenorrhea:mild, occurring first 1-2 days of flow with back pain.  Patient reports problems with: anxiety and changes in libido. Left collar bone lump, noticed 1 month ago. Covid positive 10/2020 and first Vaccine 2021  Partner Status: Marital Status: .  New Partners since last visit: no.  Desires STD Screening:  No. Sleeps with C denisse machine for sleep apnea    Additional OB/GYN History   Current contraception: vasectomy   Last Pap :   Last Completed Pap Smear       Status Date      PAP SMEAR Done 3/11/2020 in [place]        History of abnormal Pap smear: no  Family history of uterine, colon, breast, or ovarian cancer: no  Performs monthly Self-Breast Exam: no  Last mammogram:   Last Completed Mammogram    Patient has no health maintenance due at this time       Feelings of Anxiety or Depression: yes - stable on meds  Tobacco Usage?: No   OB History        5    Para   2    Term   0       2    AB   3    Living   2       SAB   3    TAB   0    Ectopic   0    Molar        Multiple   0    Live Births   2                Health Maintenance   Topic Date Due   • Annual Gynecologic Pelvic and Breast Exam  Never done   • ANNUAL PHYSICAL  Never done   • TDAP/TD VACCINES (1 - Tdap) Never done   • HEPATITIS C SCREENING  Never done   • INFLUENZA VACCINE  2020   • PAP SMEAR  2023   • Pneumococcal Vaccine 0-64  Aged Out   • MENINGOCOCCAL VACCINE  Aged Out         Current Outpatient Medications:   •  clindamycin (CLEOCIN T) 1 % external solution, , Disp: , Rfl:   •  hydroCHLOROthiazide (HYDRODIURIL) 25 MG tablet, Take 1 tablet by mouth 2 (Two) Times a Day., Disp: 180 tablet, Rfl: 3  •  ibuprofen (ADVIL,MOTRIN) 800 MG tablet, Take 1  "tablet by mouth Every 6 (Six) Hours As Needed for Mild Pain . (Patient taking differently: Take 800 mg by mouth Every 6 (Six) Hours As Needed for Mild Pain . prn), Disp: 40 tablet, Rfl: 5  •  lisinopril (PRINIVIL,ZESTRIL) 40 MG tablet, Take 1 tablet by mouth Daily., Disp: 90 tablet, Rfl: 1  •  metoprolol succinate XL (TOPROL-XL) 25 MG 24 hr tablet, Take 1 tablet by mouth Daily., Disp: 90 tablet, Rfl: 1  •  pantoprazole (Protonix) 40 MG EC tablet, Take 1 tablet by mouth Daily. (Patient taking differently: Take 40 mg by mouth Daily. As needed), Disp: 90 tablet, Rfl: 3  •  sertraline (ZOLOFT) 100 MG tablet, Take 100 mg by mouth Daily. 2 po qd per pt, Disp: , Rfl:   •  tiZANidine (ZANAFLEX) 2 MG tablet, Take 1-2 nightly for muscle spasm, Disp: 20 tablet, Rfl: 1  •  levothyroxine (Synthroid) 200 MCG tablet, Take 1 tablet by mouth Daily., Disp: 90 tablet, Rfl: 3    The additional following portions of the patient's history were reviewed and updated as appropriate: current medications, past family history, past medical history, past social history, past surgical history and problem list.    Review of Systems   Constitutional: Negative.  Negative for activity change.   HENT: Negative.    Eyes: Negative.    Respiratory: Positive for apnea.    Cardiovascular: Negative.    Gastrointestinal: Negative.    Endocrine: Negative.    Genitourinary: Negative.    Allergic/Immunologic: Negative.    Neurological: Negative.    Hematological: Positive for adenopathy.   Psychiatric/Behavioral: The patient is nervous/anxious.        I have reviewed and agree with the HPI, ROS, and historical information as entered above. Jorge Sidhu MD    Objective   /84   Ht 172.7 cm (68\")   Wt 136 kg (300 lb)   LMP 03/17/2020   Breastfeeding No   BMI 45.61 kg/m²     PE    Breast: Without masses ,nontender, no skin changes or retractions  Axilla: Normal, no lymphadenopathy  Heart: Regular rate no murmurs rubs or gallops  Lungs: Clear to " auscultation, normal breath sounds bilaterally  Abdomen: Soft nontender, no hepatosplenomegaly, no guarding or rebound, no masses  Pelvic exam  External genitalia: Normal introitus and vulva  Vagina: Normal mucosa no bleeding inflammation or discharge  Bladder: Normal position nontender  Urethral meatus and urethra: Normal nontender  Cervix: No lesions, no discharge, bleeding or inflammation  Bimanual: Nontender adnexa clear, no sign of uterine or ovarian enlargement  Anal: No external lesions or hemorrhoids       Assessment/Plan     Assessment     Problem List Items Addressed This Visit     None      Visit Diagnoses     Pap test, as part of routine gynecological examination    -  Primary    Relevant Orders    Pap IG, HPV-hr          1. GYN annual well woman exam.   2. Annual exam no problems    Plan     1. Fibrocystic breast changes - Encouraged decreasing caffeine, supportive bra, low dose vitamin E supplementation.  2. Reviewed HPV guidelines  3. Reviewed monthly self breast exams.  Instructed to call with lumps, pain, or breast discharge.  Yearly mammograms ordered.  4. Reviewed exercise and weight loss as preventative health measures.  Activity recommends - Adult 150-300 min/week of multi-component physical activities that include balance training, aerobic and physical strengthening.  Disabled or ill adults should still try to fulfill these requirements, with modifications based on their conditions.  Healthy lifestyle changes including diet and exercise reviewed  Preventative health measures reviewed    Jorge Sidhu MD  03/22/2021

## 2021-03-26 DIAGNOSIS — Z01.419 PAP TEST, AS PART OF ROUTINE GYNECOLOGICAL EXAMINATION: ICD-10-CM

## 2021-06-21 ENCOUNTER — TELEPHONE (OUTPATIENT)
Dept: FAMILY MEDICINE CLINIC | Facility: CLINIC | Age: 36
End: 2021-06-21

## 2021-06-22 ENCOUNTER — TRANSCRIBE ORDERS (OUTPATIENT)
Dept: FAMILY MEDICINE CLINIC | Facility: CLINIC | Age: 36
End: 2021-06-22

## 2021-06-22 DIAGNOSIS — G47.30 SLEEP APNEA, UNSPECIFIED TYPE: Primary | ICD-10-CM

## 2021-08-08 DIAGNOSIS — I10 ESSENTIAL HYPERTENSION: ICD-10-CM

## 2021-08-10 RX ORDER — HYDROCHLOROTHIAZIDE 25 MG/1
TABLET ORAL
Qty: 60 TABLET | Refills: 0 | Status: SHIPPED | OUTPATIENT
Start: 2021-08-10 | End: 2021-12-10 | Stop reason: SDUPTHER

## 2021-08-10 RX ORDER — METOPROLOL SUCCINATE 25 MG/1
TABLET, EXTENDED RELEASE ORAL
Qty: 90 TABLET | Refills: 3 | OUTPATIENT
Start: 2021-08-10

## 2021-08-10 RX ORDER — LISINOPRIL 40 MG/1
TABLET ORAL
Qty: 30 TABLET | Refills: 0 | Status: SHIPPED | OUTPATIENT
Start: 2021-08-10 | End: 2021-12-10 | Stop reason: SDUPTHER

## 2021-08-12 ENCOUNTER — TELEPHONE (OUTPATIENT)
Dept: FAMILY MEDICINE CLINIC | Facility: CLINIC | Age: 36
End: 2021-08-12

## 2021-08-12 PROCEDURE — 84550 ASSAY OF BLOOD/URIC ACID: CPT | Performed by: FAMILY MEDICINE

## 2021-08-12 NOTE — TELEPHONE ENCOUNTER
----- Message from Dianna Caicedo sent at 8/12/2021 12:06 PM EDT -----  Regarding: Non-Urgent Medical Question  Contact: [phone]  I understand you all may not be seeing patients in office. I've hurt my right foot somehow (unable to put full pressure flat on it). I don't know if I've caused a stress fracture or not but the injury feels like my past injury on my left foot few years ago ( 5 Stress Fractures and a tear.  Trying to see if I can possible get a referral for either Ortho that [facility] uses or [facility] here in [place]. I'm afraid X-rays will be needed but not show what possible a MRI will. If I need to visit an ER I can, please advise what I need to do.

## 2021-08-13 ENCOUNTER — TELEPHONE (OUTPATIENT)
Dept: URGENT CARE | Facility: CLINIC | Age: 36
End: 2021-08-13

## 2021-08-13 NOTE — TELEPHONE ENCOUNTER
I would suggest starting with an in office appointment with me next week.  We could also try to get an appointment with orthopedics but generally Dr. Richardson needs notes from our office before she will except a referral.

## 2021-08-19 ENCOUNTER — TELEPHONE (OUTPATIENT)
Dept: FAMILY MEDICINE CLINIC | Facility: CLINIC | Age: 36
End: 2021-08-19

## 2021-08-19 NOTE — TELEPHONE ENCOUNTER
----- Message from Dianna Caicedo sent at 8/19/2021  9:22 AM EDT -----  Regarding: RE: Non-Urgent Medical Question  Contact: [phone]  After speaking with you all I couldn’t stand the pain, went to urgent treatment. My acid levels were high.   I’m still having uncontrollable pain and have appointment with Dr Jhaveri Monday. I wanted to see if I could get a blood draw order for my acid again so I could get today possible have the results before my appointment.     Would you all like for me to schedule a follow up for Monday also?

## 2021-08-23 ENCOUNTER — OFFICE VISIT (OUTPATIENT)
Dept: ORTHOPEDIC SURGERY | Facility: CLINIC | Age: 36
End: 2021-08-23

## 2021-08-23 VITALS
SYSTOLIC BLOOD PRESSURE: 174 MMHG | BODY MASS INDEX: 42.78 KG/M2 | DIASTOLIC BLOOD PRESSURE: 93 MMHG | HEIGHT: 69 IN | WEIGHT: 288.8 LBS | HEART RATE: 97 BPM

## 2021-08-23 DIAGNOSIS — M79.671 RIGHT FOOT PAIN: Primary | ICD-10-CM

## 2021-08-23 PROCEDURE — 99214 OFFICE O/P EST MOD 30 MIN: CPT | Performed by: ORTHOPAEDIC SURGERY

## 2021-08-23 RX ORDER — COLCHICINE 0.6 MG/1
0.6 TABLET ORAL 4 TIMES DAILY
Qty: 10 TABLET | Refills: 0 | Status: SHIPPED | OUTPATIENT
Start: 2021-08-23 | End: 2021-09-15

## 2021-08-23 RX ORDER — INDOMETHACIN 75 MG/1
75 CAPSULE, EXTENDED RELEASE ORAL 2 TIMES DAILY WITH MEALS
Qty: 20 CAPSULE | Refills: 0 | Status: SHIPPED | OUTPATIENT
Start: 2021-08-23 | End: 2021-09-15
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
## 2021-08-24 ENCOUNTER — TELEPHONE (OUTPATIENT)
Dept: ORTHOPEDIC SURGERY | Facility: CLINIC | Age: 36
End: 2021-08-24

## 2021-08-24 NOTE — TELEPHONE ENCOUNTER
CAREY - see below      Marilu Crews            ----- Message from Dianna Caicedo sent at 8/24/2021  8:45 AM EDT -----  Regarding: Prescription Question  Contact: [phone]  Just wanted to send you a message regarding my 2 day prescription from yesterday. Pharmacy stated that insurance was rejecting it. You may have received a notification from pharmacy. Please follow up with Pharmacy at [facility].

## 2021-08-24 NOTE — TELEPHONE ENCOUNTER
I spoke with Aspen at [facility] [employer] to get prior authorization.  She said to tell pharmacy to run medication as tablets generic-colcrys and it will go through.Called pharmacy back and gave them the NDC # [id number].  It was finally approved.  They will have medication ready around 2:00.    Marilu Crews

## 2021-09-03 ENCOUNTER — HOSPITAL ENCOUNTER (OUTPATIENT)
Dept: MRI IMAGING | Facility: HOSPITAL | Age: 36
Discharge: HOME | End: 2021-09-03
Admitting: ORTHOPAEDIC SURGERY

## 2021-09-03 DIAGNOSIS — M79.671 RIGHT FOOT PAIN: ICD-10-CM

## 2021-09-03 PROCEDURE — 73718 MRI LOWER EXTREMITY W/O DYE: CPT

## 2021-09-15 ENCOUNTER — OFFICE VISIT (OUTPATIENT)
Dept: ORTHOPEDIC SURGERY | Facility: CLINIC | Age: 36
End: 2021-09-15

## 2021-09-15 VITALS
HEART RATE: 90 BPM | DIASTOLIC BLOOD PRESSURE: 99 MMHG | BODY MASS INDEX: 44.41 KG/M2 | WEIGHT: 293 LBS | SYSTOLIC BLOOD PRESSURE: 174 MMHG | HEIGHT: 68 IN

## 2021-09-15 DIAGNOSIS — M84.374D STRESS FRACTURE OF RIGHT FOOT WITH ROUTINE HEALING, SUBSEQUENT ENCOUNTER: Primary | ICD-10-CM

## 2021-09-15 PROCEDURE — 99212 OFFICE O/P EST SF 10 MIN: CPT | Performed by: ORTHOPAEDIC SURGERY

## 2021-09-15 RX ORDER — NITROFURANTOIN 25; 75 MG/1; MG/1
100 CAPSULE ORAL 2 TIMES DAILY
COMMUNITY
Start: 2021-09-05 | End: 2021-12-10
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
## 2021-09-27 ENCOUNTER — OFFICE VISIT (OUTPATIENT)
Dept: SLEEP MEDICINE | Facility: HOSPITAL | Age: 36
End: 2021-09-27

## 2021-09-27 VITALS
SYSTOLIC BLOOD PRESSURE: 183 MMHG | HEIGHT: 68 IN | HEART RATE: 84 BPM | BODY MASS INDEX: 44.41 KG/M2 | DIASTOLIC BLOOD PRESSURE: 80 MMHG | OXYGEN SATURATION: 97 % | WEIGHT: 293 LBS

## 2021-09-27 DIAGNOSIS — Z99.89 OSA ON CPAP: Primary | ICD-10-CM

## 2021-09-27 DIAGNOSIS — I10 HYPERTENSION, UNSPECIFIED TYPE: ICD-10-CM

## 2021-09-27 DIAGNOSIS — G47.33 OSA ON CPAP: Primary | ICD-10-CM

## 2021-09-27 DIAGNOSIS — E66.01 CLASS 3 SEVERE OBESITY WITH SERIOUS COMORBIDITY AND BODY MASS INDEX (BMI) OF 45.0 TO 49.9 IN ADULT, UNSPECIFIED OBESITY TYPE (HCC): ICD-10-CM

## 2021-09-27 PROCEDURE — 99204 OFFICE O/P NEW MOD 45 MIN: CPT | Performed by: INTERNAL MEDICINE
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
## 2021-10-22 ENCOUNTER — TRANSCRIBE ORDERS (OUTPATIENT)
Dept: ADMINISTRATIVE | Facility: HOSPITAL | Age: 36
End: 2021-10-22

## 2021-10-22 DIAGNOSIS — Z11.59 ENCOUNTER FOR SCREENING FOR VIRAL DISEASE: Primary | ICD-10-CM

## 2021-11-03 ENCOUNTER — LAB (OUTPATIENT)
Dept: PREADMISSION TESTING | Facility: HOSPITAL | Age: 36
End: 2021-11-03

## 2021-11-03 DIAGNOSIS — Z11.59 ENCOUNTER FOR SCREENING FOR VIRAL DISEASE: ICD-10-CM

## 2021-11-03 LAB — SARS-COV-2 RNA PNL SPEC NAA+PROBE: NOT DETECTED

## 2021-11-03 PROCEDURE — U0004 COV-19 TEST NON-CDC HGH THRU: HCPCS | Performed by: SURGERY

## 2021-12-10 ENCOUNTER — OFFICE VISIT (OUTPATIENT)
Dept: FAMILY MEDICINE CLINIC | Facility: CLINIC | Age: 36
End: 2021-12-10

## 2021-12-10 VITALS
DIASTOLIC BLOOD PRESSURE: 82 MMHG | TEMPERATURE: 97 F | SYSTOLIC BLOOD PRESSURE: 136 MMHG | WEIGHT: 293 LBS | OXYGEN SATURATION: 99 % | HEART RATE: 90 BPM | HEIGHT: 68 IN | BODY MASS INDEX: 44.41 KG/M2

## 2021-12-10 DIAGNOSIS — F32.A DEPRESSIVE DISORDER: ICD-10-CM

## 2021-12-10 DIAGNOSIS — I10 ESSENTIAL HYPERTENSION: ICD-10-CM

## 2021-12-10 DIAGNOSIS — E03.9 ACQUIRED HYPOTHYROIDISM: Primary | ICD-10-CM

## 2021-12-10 DIAGNOSIS — I10 PRIMARY HYPERTENSION: ICD-10-CM

## 2021-12-10 DIAGNOSIS — E66.01 CLASS 3 SEVERE OBESITY WITH BODY MASS INDEX (BMI) OF 45.0 TO 49.9 IN ADULT, UNSPECIFIED OBESITY TYPE, UNSPECIFIED WHETHER SERIOUS COMORBIDITY PRESENT (HCC): ICD-10-CM

## 2021-12-10 DIAGNOSIS — G56.03 BILATERAL CARPAL TUNNEL SYNDROME: ICD-10-CM

## 2021-12-10 DIAGNOSIS — F41.9 ANXIETY: ICD-10-CM

## 2021-12-10 PROCEDURE — 99214 OFFICE O/P EST MOD 30 MIN: CPT | Performed by: PHYSICIAN ASSISTANT

## 2021-12-10 RX ORDER — METOPROLOL SUCCINATE 25 MG/1
25 TABLET, EXTENDED RELEASE ORAL DAILY
Qty: 90 TABLET | Refills: 1 | Status: SHIPPED | OUTPATIENT
Start: 2021-12-10 | End: 2023-02-28

## 2021-12-10 RX ORDER — BUSPIRONE HYDROCHLORIDE 10 MG/1
10 TABLET ORAL 2 TIMES DAILY
Qty: 60 TABLET | Refills: 5 | Status: SHIPPED | OUTPATIENT
Start: 2021-12-10 | End: 2023-02-28

## 2021-12-10 RX ORDER — LISINOPRIL 40 MG/1
40 TABLET ORAL DAILY
Qty: 90 TABLET | Refills: 1 | Status: SHIPPED | OUTPATIENT
Start: 2021-12-10 | End: 2022-10-20 | Stop reason: SDUPTHER

## 2021-12-10 RX ORDER — HYDROCHLOROTHIAZIDE 25 MG/1
TABLET ORAL
Qty: 180 TABLET | Refills: 1 | Status: SHIPPED | OUTPATIENT
Start: 2021-12-10 | End: 2022-12-07

## 2021-12-10 RX ORDER — SERTRALINE HYDROCHLORIDE 100 MG/1
TABLET, FILM COATED ORAL
Qty: 180 TABLET | Refills: 3 | Status: SHIPPED | OUTPATIENT
Start: 2021-12-10 | End: 2022-12-07 | Stop reason: SDUPTHER

## 2021-12-10 NOTE — PROGRESS NOTES
"Subjective   Dianna Caicedo is a 36 y.o. female  Hypertension (follow up on BP, refill on HCTZ,metoprolol, Lisinopril ) and Anxiety (Follow up on anxiety, refill on zoloft)      History of Present Illness     Patient is a 36-year-old female seen today to talk about anxiety and hypertension. The patient notes she started going to [facility] to try and loose weight without any progress. She reports that she was instructed to phase off of her levothyroxine by Dr. Yomi Hatfield at [facility] due to having blood work done with him and her levels showed no signs of hypothyroidism. Since being off her thyroid medication she has not redone any blood work to check her thyroid levels. She states that she never received the referral to an orthopedic surgeon for her bilateral carpal tunnel. Her blood pressure is stable in office today. She is taking her lisinopril, hydrochlorothiazide, and metoprolol as prescribed without any side effects. The patient reports her anxiety and depression are \"all over the place\".  She reports the last 1-1/2 months her symptoms have worsened. The patient notes she is currently taking her sertraline as prescribed. She is taking tizanidine p.r.n. and pantoprazole p.r.n and is not needing any refills at this time.     The following portions of the patient's history were reviewed and updated as appropriate: allergies, current medications, past social history and problem list    Review of Systems   Constitutional: Negative for appetite change, fatigue and unexpected weight change.   Eyes: Negative for visual disturbance.   Respiratory: Negative for cough, chest tightness and shortness of breath.    Cardiovascular: Negative for chest pain, palpitations and leg swelling.   Gastrointestinal: Negative for abdominal distention, abdominal pain, constipation, diarrhea and nausea.   Endocrine: Negative for cold intolerance and heat intolerance.   Musculoskeletal:        Bilateral carpel " tunnel   Skin: Negative for color change and rash.   Neurological: Negative for dizziness, tremors, syncope, weakness, light-headedness and headaches.   Psychiatric/Behavioral: Positive for dysphoric mood. Negative for agitation, behavioral problems, confusion, decreased concentration and suicidal ideas. The patient is nervous/anxious.    All other systems reviewed and are negative.      Objective     Vitals:    12/10/21 1609   BP: 136/82   Pulse: 90   Temp: 97 °F (36.1 °C)   SpO2: 99%       Physical Exam  Vitals and nursing note reviewed.   Constitutional:       General: She is not in acute distress.     Appearance: Normal appearance. She is obese. She is not ill-appearing, toxic-appearing or diaphoretic.      Comments: BMI of 46.38     HENT:      Head: Normocephalic and atraumatic.   Eyes:      Comments: No exopthalmos noted   Neck:      Thyroid: No thyroid mass, thyromegaly or thyroid tenderness.      Vascular: No carotid bruit or JVD.   Cardiovascular:      Rate and Rhythm: Normal rate and regular rhythm.      Pulses: Normal pulses.      Heart sounds: Normal heart sounds. No murmur heard.      Pulmonary:      Effort: Pulmonary effort is normal. No respiratory distress.      Breath sounds: Normal breath sounds.   Abdominal:      Palpations: Abdomen is soft. There is no mass.      Tenderness: There is no abdominal tenderness.   Lymphadenopathy:      Cervical: No cervical adenopathy.   Skin:     General: Skin is warm and dry.   Neurological:      Mental Status: She is alert and oriented to person, place, and time.      Coordination: Coordination normal.   Psychiatric:         Attention and Perception: Attention and perception normal. She is attentive.         Mood and Affect: Mood is anxious and depressed. Affect is tearful. Affect is not angry or inappropriate.         Speech: Speech normal.         Behavior: Behavior normal.         Thought Content: Thought content normal.         Cognition and Memory: Cognition  normal.         Judgment: Judgment normal.         Assessment/Plan     Diagnoses and all orders for this visit:    1. Acquired hypothyroidism (Primary)  -     TSH; Future  -     T4, Free; Future    2. Bilateral carpal tunnel syndrome  -     Ambulatory Referral to Hand Surgery    3. Primary hypertension  -     Basic metabolic panel; Future    4. Essential hypertension  -     metoprolol succinate XL (TOPROL-XL) 25 MG 24 hr tablet; Take 1 tablet by mouth Daily.  Dispense: 90 tablet; Refill: 1  -     hydroCHLOROthiazide (HYDRODIURIL) 25 MG tablet; Take 2 daily  Dispense: 180 tablet; Refill: 1    5. Class 3 severe obesity with body mass index (BMI) of 45.0 to 49.9 in adult, unspecified obesity type, unspecified whether serious comorbidity present (HCC)  -     Ambulatory Referral to Bariatric Surgery    6. Depressive disorder    7. Anxiety    Other orders  -     lisinopril (PRINIVIL,ZESTRIL) 40 MG tablet; Take 1 tablet by mouth Daily.  Dispense: 90 tablet; Refill: 1  -     busPIRone (BUSPAR) 10 MG tablet; Take 1 tablet by mouth 2 (Two) Times a Day. For anxiety  Dispense: 60 tablet; Refill: 5  -     sertraline (ZOLOFT) 100 MG tablet; 2 po qd  Dispense: 180 tablet; Refill: 3     The patient states that she was advised to discontinue taking her levothyroxine for her hypothyroidism by her chiropractor, so she has been off of it since summer of 2021. Labs ordered to recheck thyroid levels and a metabolic panel. Referral to an orthopedic surgeon for consultation and evaluation of bilateral carpel tunnel syndrome. The patient is to continue all medications as prescribed and refills were sent to her pharmacy today. For her anxiety and depression, she is to continue taking her sertraline and we are going to add Buspar as well. Discussed patient's weight concerns with her today and we will do a referral to a bariatric surgeon for a consultation on weight loss surgery.    Transcribed from ambient dictation for FRANCISCO Rey by  Hannah Aguilar.    Answers for HPI/ROS submitted by the patient on 12/9/2021  Please describe your symptoms.: Follow up to get meds refilled, Back pain has went from a once in awhile to a daily pain.  Have you had these symptoms before?: Yes  How long have you been having these symptoms?: Greater than 2 weeks  What is the primary reason for your visit?: Other

## 2021-12-17 ENCOUNTER — LAB (OUTPATIENT)
Dept: LAB | Facility: HOSPITAL | Age: 36
End: 2021-12-17

## 2021-12-17 DIAGNOSIS — E03.9 ACQUIRED HYPOTHYROIDISM: ICD-10-CM

## 2021-12-17 DIAGNOSIS — I10 PRIMARY HYPERTENSION: ICD-10-CM

## 2021-12-17 LAB
ANION GAP SERPL CALCULATED.3IONS-SCNC: 15.3 MMOL/L (ref 5–15)
BUN SERPL-MCNC: 12 MG/DL (ref 6–20)
BUN/CREAT SERPL: 11.3 (ref 7–25)
CALCIUM SPEC-SCNC: 9.8 MG/DL (ref 8.6–10.5)
CHLORIDE SERPL-SCNC: 97 MMOL/L (ref 98–107)
CO2 SERPL-SCNC: 26.7 MMOL/L (ref 22–29)
CREAT SERPL-MCNC: 1.06 MG/DL (ref 0.57–1)
GFR SERPL CREATININE-BSD FRML MDRD: 59 ML/MIN/1.73
GLUCOSE SERPL-MCNC: 120 MG/DL (ref 65–99)
POTASSIUM SERPL-SCNC: 4.2 MMOL/L (ref 3.5–5.2)
SODIUM SERPL-SCNC: 139 MMOL/L (ref 136–145)
T4 FREE SERPL-MCNC: 1.4 NG/DL (ref 0.93–1.7)
TSH SERPL DL<=0.05 MIU/L-ACNC: 3.01 UIU/ML (ref 0.27–4.2)

## 2021-12-17 PROCEDURE — 84439 ASSAY OF FREE THYROXINE: CPT

## 2021-12-17 PROCEDURE — 36415 COLL VENOUS BLD VENIPUNCTURE: CPT

## 2021-12-17 PROCEDURE — 80048 BASIC METABOLIC PNL TOTAL CA: CPT

## 2021-12-17 PROCEDURE — 84443 ASSAY THYROID STIM HORMONE: CPT

## 2022-02-04 ENCOUNTER — TELEPHONE (OUTPATIENT)
Dept: FAMILY MEDICINE CLINIC | Facility: CLINIC | Age: 37
End: 2022-02-04

## 2022-02-04 RX ORDER — AZITHROMYCIN 250 MG/1
TABLET, FILM COATED ORAL
Qty: 6 TABLET | Refills: 0 | Status: SHIPPED | OUTPATIENT
Start: 2022-02-04 | End: 2022-04-26

## 2022-02-04 NOTE — TELEPHONE ENCOUNTER
----- Message from Dianna Caicedo sent at 2/4/2022  9:05 AM EST -----  Regarding: Sinus Infection   Any possible way of getting a ZPak possible. I’ve already been tested for Covid, but pressure in head and eyes will not go away. Trying to prevent from moving into my chest.   Thank you!

## 2022-03-14 ENCOUNTER — TELEPHONE (OUTPATIENT)
Dept: FAMILY MEDICINE CLINIC | Facility: CLINIC | Age: 37
End: 2022-03-14

## 2022-03-28 ENCOUNTER — OFFICE VISIT (OUTPATIENT)
Dept: SLEEP MEDICINE | Facility: HOSPITAL | Age: 37
End: 2022-03-28

## 2022-03-28 VITALS
OXYGEN SATURATION: 97 % | SYSTOLIC BLOOD PRESSURE: 149 MMHG | HEART RATE: 89 BPM | HEIGHT: 69 IN | DIASTOLIC BLOOD PRESSURE: 89 MMHG | WEIGHT: 293 LBS | BODY MASS INDEX: 43.4 KG/M2

## 2022-03-28 DIAGNOSIS — Z99.89 OSA ON CPAP: Primary | ICD-10-CM

## 2022-03-28 DIAGNOSIS — G47.33 OSA ON CPAP: Primary | ICD-10-CM

## 2022-03-28 PROCEDURE — 99213 OFFICE O/P EST LOW 20 MIN: CPT | Performed by: NURSE PRACTITIONER

## 2022-03-28 NOTE — PROGRESS NOTES
Chief Complaint:   Chief Complaint   Patient presents with   • Follow-up       HPI:    Dianna Caicedo is a 37 y.o. female here for follow-up of sleep apnea.  Patient has a history of hypertension, DVT, morbid obesity, syncope, and sleep apnea.  Patient was last seen 9/27/2021 and was continuing to do well on CPAP therapy.  Her download was unavailable on her last appointment.  Patient states she is doing well sleeping 7 to 9 hours nightly and does feel rested upon awakening.  She goes to sleep quickly and very rarely will get up during the night.  Patient has an Arona score of 7/24.  She has no concerns or complaints and will continue CPAP therapy.  Patient is going to have a consult for a gastric sleeve if she should lose 20% of her body weight she will let me know so that we may retest.        Current medications are:   Current Outpatient Medications:   •  azithromycin (Zithromax Z-Adrian) 250 MG tablet, Take 2 tablets by mouth on day 1, then 1 tablet daily on days 2-5, Disp: 6 tablet, Rfl: 0  •  busPIRone (BUSPAR) 10 MG tablet, Take 1 tablet by mouth 2 (Two) Times a Day. For anxiety, Disp: 60 tablet, Rfl: 5  •  clindamycin (CLEOCIN T) 1 % external solution, , Disp: , Rfl:   •  hydroCHLOROthiazide (HYDRODIURIL) 25 MG tablet, Take 2 daily, Disp: 180 tablet, Rfl: 1  •  lisinopril (PRINIVIL,ZESTRIL) 40 MG tablet, Take 1 tablet by mouth Daily., Disp: 90 tablet, Rfl: 1  •  metoprolol succinate XL (TOPROL-XL) 25 MG 24 hr tablet, Take 1 tablet by mouth Daily., Disp: 90 tablet, Rfl: 1  •  mupirocin (BACTROBAN) 2 % ointment, , Disp: , Rfl:   •  naproxen (EC NAPROSYN) 500 MG EC tablet, Take 1 tablet by mouth 2 (Two) Times a Day With Meals., Disp: 20 tablet, Rfl: 0  •  pantoprazole (Protonix) 40 MG EC tablet, Take 1 tablet by mouth Daily. (Patient taking differently: Take 40 mg by mouth Daily. As needed), Disp: 90 tablet, Rfl: 3  •  sertraline (ZOLOFT) 100 MG tablet, 2 po qd, Disp: 180 tablet, Rfl: 3  •  tiZANidine  (ZANAFLEX) 2 MG tablet, Take 1-2 nightly for muscle spasm, Disp: 20 tablet, Rfl: 1.      The patient's relevant past medical, surgical, family and social history were reviewed and updated in Epic as appropriate.       Review of Systems   Respiratory: Positive for apnea.    Cardiovascular: Positive for leg swelling.   Gastrointestinal:        Heartburn   Musculoskeletal: Positive for arthralgias and myalgias.   Neurological: Positive for headaches.   Psychiatric/Behavioral: Positive for dysphoric mood and sleep disturbance. The patient is nervous/anxious.    All other systems reviewed and are negative.        Objective:    Physical Exam  Constitutional:       Appearance: Normal appearance.   HENT:      Head: Normocephalic and atraumatic.      Mouth/Throat:      Comments: Mallampati 3 anatomy  Cardiovascular:      Rate and Rhythm: Normal rate and regular rhythm.   Pulmonary:      Breath sounds: Normal breath sounds.   Skin:     General: Skin is warm and dry.   Neurological:      Mental Status: She is alert and oriented to person, place, and time.   Psychiatric:         Mood and Affect: Mood normal.         Behavior: Behavior normal.         Thought Content: Thought content normal.         Judgment: Judgment normal.       90/90 days of use  Greater than 4-hour use 100%  90% pressure 5.9  AHI 3.4  Settings 6-20    ASSESSMENT/PLAN    Diagnoses and all orders for this visit:    1. NGUYEN on CPAP (Primary)  -     PAP Therapy            1. Counseled patient regarding multimodal approach with healthy nutrition, healthy sleep, regular physical activity, social activities, counseling, and medications. Encouraged to practice lateral sleep position. Avoid alcohol and sedatives close to bedtime.  2. Refill supplies x1 year.  Return to clinic 1 year or sooner symptoms warrant.    I have reviewed the results of my evaluation and impression and discussed my recommendations in detail with the patient.      Signed by  Cydney Matthews,  APRN    March 28, 2022      CC: Charisse Meyer, NOE          No ref. provider found

## 2022-04-26 ENCOUNTER — DOCUMENTATION (OUTPATIENT)
Dept: BARIATRICS/WEIGHT MGMT | Facility: CLINIC | Age: 37
End: 2022-04-26

## 2022-04-26 ENCOUNTER — OFFICE VISIT (OUTPATIENT)
Dept: BEHAVIORAL HEALTH | Facility: CLINIC | Age: 37
End: 2022-04-26

## 2022-04-26 ENCOUNTER — TELEPHONE (OUTPATIENT)
Dept: BARIATRICS/WEIGHT MGMT | Facility: CLINIC | Age: 37
End: 2022-04-26

## 2022-04-26 ENCOUNTER — OFFICE VISIT (OUTPATIENT)
Dept: BARIATRICS/WEIGHT MGMT | Facility: CLINIC | Age: 37
End: 2022-04-26

## 2022-04-26 VITALS
WEIGHT: 293 LBS | SYSTOLIC BLOOD PRESSURE: 122 MMHG | HEIGHT: 68 IN | HEART RATE: 107 BPM | BODY MASS INDEX: 44.41 KG/M2 | DIASTOLIC BLOOD PRESSURE: 68 MMHG | RESPIRATION RATE: 18 BRPM | OXYGEN SATURATION: 98 % | TEMPERATURE: 96.3 F

## 2022-04-26 DIAGNOSIS — I10 ESSENTIAL HYPERTENSION: Primary | ICD-10-CM

## 2022-04-26 DIAGNOSIS — Z63.6 CAREGIVER BURDEN: ICD-10-CM

## 2022-04-26 DIAGNOSIS — E66.01 MORBID OBESITY WITH BMI OF 45.0-49.9, ADULT: ICD-10-CM

## 2022-04-26 DIAGNOSIS — R60.0 LOWER EXTREMITY EDEMA: ICD-10-CM

## 2022-04-26 DIAGNOSIS — G89.29 CHRONIC JOINT PAIN: ICD-10-CM

## 2022-04-26 DIAGNOSIS — Z99.89 OSA ON CPAP: ICD-10-CM

## 2022-04-26 DIAGNOSIS — Z86.718 PERSONAL HISTORY OF DVT (DEEP VEIN THROMBOSIS): ICD-10-CM

## 2022-04-26 DIAGNOSIS — F41.9 ANXIETY: ICD-10-CM

## 2022-04-26 DIAGNOSIS — E66.01 MORBID OBESITY: Primary | ICD-10-CM

## 2022-04-26 DIAGNOSIS — F32.A ANXIETY AND DEPRESSION: ICD-10-CM

## 2022-04-26 DIAGNOSIS — F43.9 FEELING STRESSED OUT: ICD-10-CM

## 2022-04-26 DIAGNOSIS — Z71.89 ENCOUNTER FOR PSYCHOLOGICAL ASSESSMENT PRIOR TO BARIATRIC SURGERY: ICD-10-CM

## 2022-04-26 DIAGNOSIS — G47.33 OSA ON CPAP: ICD-10-CM

## 2022-04-26 DIAGNOSIS — Z11.52 ENCOUNTER FOR SCREENING FOR COVID-19: ICD-10-CM

## 2022-04-26 DIAGNOSIS — R10.13 DYSPEPSIA: ICD-10-CM

## 2022-04-26 DIAGNOSIS — R12 HEARTBURN: ICD-10-CM

## 2022-04-26 DIAGNOSIS — F41.9 ANXIETY AND DEPRESSION: ICD-10-CM

## 2022-04-26 DIAGNOSIS — M25.50 CHRONIC JOINT PAIN: ICD-10-CM

## 2022-04-26 PROBLEM — N97.9 FEMALE INFERTILITY: Status: ACTIVE | Noted: 2022-04-26

## 2022-04-26 PROBLEM — I82.409 DVT (DEEP VENOUS THROMBOSIS): Status: ACTIVE | Noted: 2022-04-26

## 2022-04-26 PROBLEM — M10.9 GOUT: Status: ACTIVE | Noted: 2022-04-26

## 2022-04-26 PROCEDURE — 90791 PSYCH DIAGNOSTIC EVALUATION: CPT | Performed by: PSYCHOLOGIST

## 2022-04-26 PROCEDURE — 99204 OFFICE O/P NEW MOD 45 MIN: CPT | Performed by: PHYSICIAN ASSISTANT

## 2022-04-26 SDOH — SOCIAL STABILITY - SOCIAL INSECURITY: DEPENDENT RELATIVE NEEDING CARE AT HOME: Z63.6

## 2022-04-26 NOTE — PROGRESS NOTES
PROGRESS NOTE    Data:    Dianna Caicedo is a 37 y.o. female who met with the undersigned for a scheduled psychological evaluation from 10:30 - 11:15am.       Clinical Maneuvering/Intervention:      Chief complaint and history of presenting illness/Problems: struggling with obesity for several years. Despite trying different weight loss plans and diets, the pt reported being unsuccessful in losing weight. A psychological evaluation was conducted in order to assess past and current level of functioning. Areas assessed included, but were not limited to: perception of social support, perception of ability to face and deal with challenges in life (positive functioning), anxiety symptoms, depressive symptoms, perspective on beliefs/belief system, coping skills for stress, intelligence level, addiction issues, etc. Therapeutic rapport was established. Interventions conducted today were geared towards assessing the pt's readiness for weight loss surgery and identifying and psychological contraindications for undergoing such a major life change. Social support was deemed strong (specific to weight loss surgery/weight loss in this manner and in a general sense): , mother, father, supervisor at work, and co-workers. Current psychological struggles were described as low, but included depression and anxiety. She was tearful in session when talking about being overweight and not being able to lose weight. She was tearful when talking about caring for her  with a brain disease when he is losing motor functioning and cognitive functioning. Coping skills for distress and related to undergoing a major life change such as weight loss surgery/weight loss were deemed adequate and included using resources to meet her needs, relying on social support, finding gratitude in small things, and having a good sense of humor. At the same time, she continued to talk about the high degree of stress in her life, being the main source  of income, caring for her , caring for her children, and then not feeling healthy/overweight. She was informed to take more time and do an action plan to help her feel more stable in life before having weight loss surgery. The pt agreed to the plan (see below).    Past Family and Social History:      History of family mental health problems: none endorsed    Psychosocial history: treatment of psychiatric care in the past (N/A), alcohol/substance abuse treatment in the past (N/A) , alcohol/substance abuse problems (N/A), inpatient psychiatric care (N/A).    Mental Status Exam (MSE):  Hygiene:  good  Dress: normal  Attitude:  cooperative and proactive  Motor Activity: normal  Speech: normal  Mood:   sad and stressed  Affect:  congruent  Thought Processes: normal  Thought Content:  worried  Suicidal Thoughts:  not endorsed  Homicidal Thoughts:  not endorsed  Crisis Safety Plan: not needed   Hallucinations:  none      Patient's Support Network Includes:  family, friends      Progress toward goal: there is evidence to suggest that she is taking measures to improve the quality of her life including seeking weight loss surgery.       Functional Status: moderate      Prognosis: good with weight loss, counseling, medication, and assistance caring for her      Evaluation, Diagnoses, and Ability/Capacity to Respond to Treatment:      The pt presented to be struggling with depression, anxiety, caregiver burden, stress and obesity (BMI = 47.2, morbid obesity). Results of MSE demonstrated a functional status of moderate. Strengths: belief in self that she will be successful with weight loss surgery, etc (see detailed list of coping skills above). Needed for growth (CPT code requirement for Weaknesses): weight loss.      From a psychological standpoint, the pt does not present as a good candidate for bariatric surgery. Mood stabilization and stress reduction needed prior to taking on this life-changing surgery. See  plan below.    Treatment Plan:      Short term goals: 1. increase/adjustment of psychotropic medication to help with anxiety/depression (required), 2. At least one more psychological evaluation to take place no sooner than 5/26/22 (two was encouraged, but one is required), and 3. [facility] and resources to help her care for her  (not required for weight loss surgery; highly encouraged only).     Mary Ellen Tanner, PhD, LP
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
## 2022-04-26 NOTE — TELEPHONE ENCOUNTER
Maurice Mittal MD Hitt, Patricia JOHNSON PA-C  Yes, let's get a hematology evaluation, thanks!

## 2022-04-29 ENCOUNTER — LAB (OUTPATIENT)
Dept: PREADMISSION TESTING | Facility: HOSPITAL | Age: 37
End: 2022-04-29

## 2022-04-29 LAB — SARS-COV-2 RNA PNL SPEC NAA+PROBE: NOT DETECTED

## 2022-04-29 PROCEDURE — C9803 HOPD COVID-19 SPEC COLLECT: HCPCS | Performed by: PHYSICIAN ASSISTANT

## 2022-04-29 PROCEDURE — U0004 COV-19 TEST NON-CDC HGH THRU: HCPCS | Performed by: PHYSICIAN ASSISTANT

## 2022-05-02 ENCOUNTER — LAB REQUISITION (OUTPATIENT)
Dept: LAB | Facility: HOSPITAL | Age: 37
End: 2022-05-02

## 2022-05-02 DIAGNOSIS — R12 HEARTBURN: ICD-10-CM

## 2022-05-02 PROCEDURE — 88305 TISSUE EXAM BY PATHOLOGIST: CPT | Performed by: SURGERY

## 2022-05-03 LAB
CYTO UR: NORMAL
LAB AP CASE REPORT: NORMAL
LAB AP CLINICAL INFORMATION: NORMAL
PATH REPORT.FINAL DX SPEC: NORMAL
PATH REPORT.GROSS SPEC: NORMAL

## 2022-05-09 ENCOUNTER — OFFICE VISIT (OUTPATIENT)
Dept: BARIATRICS/WEIGHT MGMT | Facility: CLINIC | Age: 37
End: 2022-05-09

## 2022-05-09 ENCOUNTER — OFFICE VISIT (OUTPATIENT)
Dept: BEHAVIORAL HEALTH | Facility: CLINIC | Age: 37
End: 2022-05-09

## 2022-05-09 VITALS
HEIGHT: 68 IN | RESPIRATION RATE: 18 BRPM | TEMPERATURE: 98.5 F | HEART RATE: 76 BPM | SYSTOLIC BLOOD PRESSURE: 136 MMHG | BODY MASS INDEX: 44.41 KG/M2 | OXYGEN SATURATION: 98 % | WEIGHT: 293 LBS | DIASTOLIC BLOOD PRESSURE: 68 MMHG

## 2022-05-09 DIAGNOSIS — E66.01 MORBID OBESITY: Primary | ICD-10-CM

## 2022-05-09 DIAGNOSIS — Z71.3 ENCOUNTER FOR WEIGHT LOSS COUNSELING: ICD-10-CM

## 2022-05-09 DIAGNOSIS — E66.01 MORBID OBESITY WITH BMI OF 45.0-49.9, ADULT: Primary | ICD-10-CM

## 2022-05-09 DIAGNOSIS — Z86.718 PERSONAL HISTORY OF DVT (DEEP VEIN THROMBOSIS): Primary | ICD-10-CM

## 2022-05-09 DIAGNOSIS — R45.89 FEELING DOWN: ICD-10-CM

## 2022-05-09 DIAGNOSIS — F41.9 ANXIETY AND DEPRESSION: ICD-10-CM

## 2022-05-09 DIAGNOSIS — F32.A ANXIETY AND DEPRESSION: ICD-10-CM

## 2022-05-09 PROCEDURE — 99213 OFFICE O/P EST LOW 20 MIN: CPT | Performed by: PHYSICIAN ASSISTANT

## 2022-05-09 PROCEDURE — 90834 PSYTX W PT 45 MINUTES: CPT | Performed by: PSYCHOLOGIST
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
## 2022-05-09 NOTE — PROGRESS NOTES
PROGRESS NOTE    Data:  Dianna Caicedo is a 37 y.o. female who met with the undersigned for a scheduled individual outpatient therapy session from       Clinical Maneuvering/Intervention:      The pt talked about struggling with the relationships she is having with her children, worrying about her 's ailing health, and feeling overwhelmed/drained. She was tearful in session when talking about feeling overwhelmed and then feeling guilty for getting short with her children. Stressors were processed individually and in detail. Venting of frustrations was conducted in order to help the pt feel less tense emotionally and gain insight into issues. Feelings were processed and validated several times in session. Perspective taking was conducted multiple times in order to help the pt feel less stuck, less overwhelmed, and see challenges as much more manageable. Active listening was conducted in order to help the pt make sense of stressors and start moving towards potential solutions. The pt was assisted with finding solutions based on existing skill-set and abilities. Time was allocated specifically to assess what is 'working' in the pt's life, versus what is 'not working,' (if anything) in terms of helping to improve mood and quality of life. [facility] [place], [place]. She was commended for this effort. When it came to weight loss surgery, she talked about having many worries about it. An action plan was designed to empower the pt to know what to do. Simple steps of one, two, three were laid out in order to help the pt feel more in control of things and feel less stressed. She will give herself time to 'get her head around it,' and decide if it is something she would like (or not like) to do. She talked about feeling on the fence about whether or not to increase her medication for anxiety/mood. She was assisted with the process of making the decision herself and  with her doctor over time. Homework was assigned tailored to pt's needs. The pt expressed gratitude for today's session.    Mental Status Exam  Hygiene:  good  Dress: normal  Attitude:  cooperative and proactive  Motor Activity: normal  Speech: normal  Mood:  anxious  Affect:  congruent  Thought Processes: normal  Thought Content:  normal  Suicidal Thoughts:  not endorsed  Homicidal Thoughts:  not endorsed  Crisis Safety Plan: not needed   Hallucinations:  none      Patient's Support Network Includes:  family, friends      Progress toward goal: there is evidence to suggest that she is taking measures to improve the quality of her life including seeking counseling and weight loss surgery      Functional Status: moderate to high      Prognosis: good    Assessment      The pt presented to be struggling with anxiety, depression, and crying spells. She often feels drained due to role strain in life and having to care for her  who is wheelchair bound. She tends to benefit from highly supportive and structured counseling. She seems to need more time in order to decide that weight loss surgery is the route she would like to take in order to improve her health.       Plan      In order to diminish symptoms of depression and anxiety, the pt will start counseling soon in her home town and give herself all the time she needs in order to decide whether or not weight loss surgery is the right path for her at this time. Pt will adhere to medication regimen as prescribed.     Mary Ellen Tanner, PhD, LP

## 2022-05-10 LAB
ALBUMIN SERPL-MCNC: 4.1 G/DL (ref 3.8–4.8)
ALBUMIN/GLOB SERPL: 2 {RATIO} (ref 1.2–2.2)
ALP SERPL-CCNC: 56 IU/L (ref 44–121)
ALT SERPL-CCNC: 24 IU/L (ref 0–32)
AST SERPL-CCNC: 18 IU/L (ref 0–40)
BASOPHILS # BLD AUTO: 0 X10E3/UL (ref 0–0.2)
BASOPHILS NFR BLD AUTO: 0 %
BILIRUB SERPL-MCNC: 0.4 MG/DL (ref 0–1.2)
BUN SERPL-MCNC: 14 MG/DL (ref 6–20)
BUN/CREAT SERPL: 18 (ref 9–23)
CALCIUM SERPL-MCNC: 9.3 MG/DL (ref 8.7–10.2)
CHLORIDE SERPL-SCNC: 105 MMOL/L (ref 96–106)
CHOLEST SERPL-MCNC: 132 MG/DL (ref 100–199)
CO2 SERPL-SCNC: 22 MMOL/L (ref 20–29)
CREAT SERPL-MCNC: 0.78 MG/DL (ref 0.57–1)
EGFRCR SERPLBLD CKD-EPI 2021: 102 ML/MIN/1.73
EOSINOPHIL # BLD AUTO: 0 X10E3/UL (ref 0–0.4)
EOSINOPHIL NFR BLD AUTO: 0 %
ERYTHROCYTE [DISTWIDTH] IN BLOOD BY AUTOMATED COUNT: 13.9 % (ref 11.7–15.4)
GLOBULIN SER CALC-MCNC: 2.1 G/DL (ref 1.5–4.5)
GLUCOSE SERPL-MCNC: 81 MG/DL (ref 65–99)
HBA1C MFR BLD: 5 % (ref 4.8–5.6)
HCT VFR BLD AUTO: 41.6 % (ref 34–46.6)
HDLC SERPL-MCNC: 26 MG/DL
HGB BLD-MCNC: 14.1 G/DL (ref 11.1–15.9)
IMM GRANULOCYTES # BLD AUTO: 0.1 X10E3/UL (ref 0–0.1)
IMM GRANULOCYTES NFR BLD AUTO: 1 %
LDLC SERPL CALC-MCNC: 77 MG/DL (ref 0–99)
LYMPHOCYTES # BLD AUTO: 2.4 X10E3/UL (ref 0.7–3.1)
LYMPHOCYTES NFR BLD AUTO: 27 %
MCH RBC QN AUTO: 29.1 PG (ref 26.6–33)
MCHC RBC AUTO-ENTMCNC: 33.9 G/DL (ref 31.5–35.7)
MCV RBC AUTO: 86 FL (ref 79–97)
MONOCYTES # BLD AUTO: 0.5 X10E3/UL (ref 0.1–0.9)
MONOCYTES NFR BLD AUTO: 6 %
NEUTROPHILS # BLD AUTO: 5.9 X10E3/UL (ref 1.4–7)
NEUTROPHILS NFR BLD AUTO: 66 %
PLATELET # BLD AUTO: 250 X10E3/UL (ref 150–450)
POTASSIUM SERPL-SCNC: 4.7 MMOL/L (ref 3.5–5.2)
PROT SERPL-MCNC: 6.2 G/DL (ref 6–8.5)
RBC # BLD AUTO: 4.85 X10E6/UL (ref 3.77–5.28)
SODIUM SERPL-SCNC: 141 MMOL/L (ref 134–144)
TRIGL SERPL-MCNC: 169 MG/DL (ref 0–149)
TSH SERPL DL<=0.005 MIU/L-ACNC: 2.95 UIU/ML (ref 0.45–4.5)
UREA BREATH TEST QL: NEGATIVE
VLDLC SERPL CALC-MCNC: 29 MG/DL (ref 5–40)
WBC # BLD AUTO: 8.9 X10E3/UL (ref 3.4–10.8)

## 2022-05-13 DIAGNOSIS — R12 HEARTBURN: ICD-10-CM

## 2022-05-13 DIAGNOSIS — R10.13 DYSPEPSIA: ICD-10-CM

## 2022-06-02 ENCOUNTER — TELEPHONE (OUTPATIENT)
Dept: FAMILY MEDICINE CLINIC | Facility: CLINIC | Age: 37
End: 2022-06-02

## 2022-06-02 NOTE — TELEPHONE ENCOUNTER
----- Message from Dianna Caicedo sent at 6/2/2022 12:38 PM EDT -----  Regarding: hematology Referral  I’m needing a referral for hematologist for the clearance process for the gastric sleeve.     Can you please request Stefanie to complete a request asap. Hopefully I can get appointment quickly.     Thanks   Dianna

## 2022-06-02 NOTE — TELEPHONE ENCOUNTER
I do not even know what the diagnosis is or why she is needing to see a hematologist.  Her CBC from 3 weeks ago was normal  in chart.

## 2022-06-03 DIAGNOSIS — Z86.718 PERSONAL HISTORY OF DVT (DEEP VEIN THROMBOSIS): Primary | ICD-10-CM

## 2022-06-09 ENCOUNTER — TELEMEDICINE (OUTPATIENT)
Dept: BARIATRICS/WEIGHT MGMT | Facility: CLINIC | Age: 37
End: 2022-06-09

## 2022-06-09 VITALS — WEIGHT: 293 LBS | BODY MASS INDEX: 46.83 KG/M2

## 2022-06-09 DIAGNOSIS — E66.01 OBESITY, CLASS III, BMI 40-49.9 (MORBID OBESITY): Primary | ICD-10-CM

## 2022-06-09 PROCEDURE — 99213 OFFICE O/P EST LOW 20 MIN: CPT | Performed by: PHYSICIAN ASSISTANT
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
## 2022-06-15 ENCOUNTER — OFFICE VISIT (OUTPATIENT)
Dept: CARDIOLOGY | Facility: CLINIC | Age: 37
End: 2022-06-15

## 2022-06-15 VITALS
DIASTOLIC BLOOD PRESSURE: 80 MMHG | WEIGHT: 293 LBS | BODY MASS INDEX: 44.41 KG/M2 | HEIGHT: 68 IN | SYSTOLIC BLOOD PRESSURE: 126 MMHG | OXYGEN SATURATION: 95 % | HEART RATE: 94 BPM

## 2022-06-15 DIAGNOSIS — I10 ESSENTIAL HYPERTENSION: ICD-10-CM

## 2022-06-15 DIAGNOSIS — E66.01 MORBID OBESITY WITH BMI OF 45.0-49.9, ADULT: Primary | ICD-10-CM

## 2022-06-15 PROCEDURE — 93000 ELECTROCARDIOGRAM COMPLETE: CPT | Performed by: INTERNAL MEDICINE

## 2022-06-15 PROCEDURE — 99214 OFFICE O/P EST MOD 30 MIN: CPT | Performed by: INTERNAL MEDICINE

## 2022-06-15 NOTE — PROGRESS NOTES
[facility] Cardiology  Consultation H&P  Dianna Caicedo  1985  [phone] (work).    VISIT DATE:  06/15/2022    PCP: Charisse Meyer PA-C  [address]  [facility] [address]    CC:  Chief Complaint   Patient presents with   • surgery clearance      Bariatric        ASSESSMENT:   Diagnosis Plan   1. Morbid obesity with BMI of 45.0-49.9, adult (HCC)     2. Essential hypertension           PLAN:  Perioperative cardiovascular risk assessment: Low risk for major perioperative cardiovascular complications.  No further cardiac testing or medication titration indicated prior to surgery.    Hypertension: Goal less than 130/80 mmHg.  Currently well controlled.  Agree with current medical therapy.  Previous extensive evaluation to rule out secondary etiology.    Dyslipidemia: Markedly depressed HDL level.  No evidence of early atherosclerosis via vascular calcifications noted on most recent CT chest.  Fortunately she otherwise has an excellent lipid profile with an LDL less than 80.  Discussed regular aerobic exercise and a heart healthy/predominant plant-based diet.    History of Present Illness   37-year-old morbidly obese female with a history of hypertension, dyslipidemia, NGUYEN on CPAP and vasovagal syncope being evaluated for bariatric surgery with sleeve gastrectomy.  She denies chest pain, palpitations, dyspnea on exertion.  She is able to complete at least 4-6 METS of exertion without symptoms concerning for angina or CHF.  Blood pressures running less than 130/80 mmHg on current medical therapy.  She is compliant with medical therapy.  Previous history of neurocardiogenic syncope which is significant improved in frequency over time, most recent episode in February 2020 with negative cardiac evaluation to include unremarkable CT angio chest, transthoracic echo, and 30-day ambulatory ECG monitor.  Normal twelve-lead EKG today.  Nondiabetic.   "Non-smoker.  No early family history of coronary disease.    PHYSICAL EXAMINATION:  Vitals:    06/15/22 0815   BP: 126/80   BP Location: Left arm   Patient Position: Sitting   Pulse: 94   SpO2: 95%   Weight: (!) 142 kg (312 lb)   Height: 172.7 cm (68\")     General Appearance:    Alert, cooperative, no distress, appears stated age   Head:    Normocephalic, without obvious abnormality, atraumatic   Eyes:    conjunctiva/corneas clear, EOM's intact, fundi     benign, both eyes   Ears:    Normal TM's and external ear canals, both ears   Nose:   Nares normal, septum midline, mucosa normal, no drainage    or sinus tenderness   Throat:   Lips, mucosa, and tongue normal; teeth and gums normal   Neck:   Supple, symmetrical, trachea midline, no adenopathy;     thyroid:  no enlargement/tenderness/nodules; no carotid    bruit or JVD   Back:     Symmetric, no curvature, ROM normal, no CVA tenderness   Lungs:     Clear to auscultation bilaterally, respirations unlabored   Chest Wall:    No tenderness or deformity    Heart:    Regular rate and rhythm, S1 and S2 normal, no murmur, rub   or gallop, normal carotid impulse bilaterally without bruit.   Abdomen:     Soft, non-tender, bowel sounds active all four quadrants,     no masses, no organomegaly   Extremities:   Extremities normal, atraumatic, no cyanosis or edema   Pulses:   2+ and symmetric all extremities   Skin:   Skin color, texture, turgor normal, no rashes or lesions   Lymph nodes:   Cervical, supraclavicular, and axillary nodes normal   Neurologic:   normal strength, sensation intact     throughout       Diagnostic Data:    ECG 12 Lead    Date/Time: 6/15/2022 8:46 AM  Performed by: Meliton Hassan III, MD  Authorized by: Meliton Hassan III, MD   Comparison: compared with previous ECG from 2/26/2020  Similar to previous ECG  Rhythm: sinus rhythm    Clinical impression: normal ECG          Lab Results   Component Value Date    CHLPL 132 05/09/2022    TRIG 169 (H) 05/09/2022    " "HDL 26 (L) 05/09/2022     Lab Results   Component Value Date    GLUCOSE 81 05/09/2022    BUN 14 05/09/2022    CREATININE 0.78 05/09/2022     05/09/2022    K 4.7 05/09/2022     05/09/2022    CO2 22 05/09/2022     Lab Results   Component Value Date    HGBA1C 5.0 05/09/2022     Lab Results   Component Value Date    WBC 8.9 05/09/2022    HGB 14.1 05/09/2022    HCT 41.6 05/09/2022     05/09/2022       PROBLEM LIST:  Patient Active Problem List   Diagnosis   • Personal history of DVT (deep vein thrombosis)   • Vaginal itching   • Urinary frequency   • History of pre-eclampsia in prior pregnancy, currently pregnant in second trimester   • Essential hypertension   • Morbid obesity with BMI of 45.0-49.9, adult ([facility])   • NGUYEN on CPAP   • Syncope and collapse   • Annual physical exam   • Anxiety   • Chronic joint pain   • Depression   • DVT (deep venous thrombosis) ([facility])   • Dyspepsia   • Female infertility   • Gout   • Heartburn   • Lower extremity edema       PAST MEDICAL HX  Past Medical History:   Diagnosis Date   • Anxiety    • Chronic joint pain     not treated with meds   • Depression    • DVT (deep venous thrombosis) ([facility])     2013- while pregnant, was on lovenox during pregnancy.   • Dyspepsia     food dependent,more spicy food   • Female infertility     hx IUI x 4, Clomid, moetformin, injections   • Gout     1-2x   • Heartburn     takes PPI prn, twice a month, no prior EGD or h pylori   • Hypertension 2006    Chronic   • Hypothyroidism 2007    T4 normal   • Lower back pain 2013    had significant edema in LLE during pregnancy; initial dopplers showed \"blood clot behind the knee and two small clots at the ankle; tx with Lovenox. F/U with vascular MD showed no clots. Uncertain if there were actually clots but is treated as if there were.    • Lower extremity edema    • Migraines     ibu prn   • Other sleep apnea     CPAP compliant 'severe'   • Paresthesias     hands and feet ?carpal tunnel "       Allergies  Allergies   Allergen Reactions   • Nifedipine Shortness Of Breath, Swelling and Rash     Patient reports general swelling, slow onset shortness of breath, rash around wrists and ankles.  Has not taken other Ca channel blockers that she is aware of.   • Sulfa Antibiotics Swelling     Throat swells       Current Medications    Current Outpatient Medications:   •  busPIRone (BUSPAR) 10 MG tablet, Take 1 tablet by mouth 2 (Two) Times a Day. For anxiety, Disp: 60 tablet, Rfl: 5  •  clindamycin (CLEOCIN T) 1 % external solution, , Disp: , Rfl:   •  hydroCHLOROthiazide (HYDRODIURIL) 25 MG tablet, Take 2 daily, Disp: 180 tablet, Rfl: 1  •  lisinopril (PRINIVIL,ZESTRIL) 40 MG tablet, Take 1 tablet by mouth Daily., Disp: 90 tablet, Rfl: 1  •  metoprolol succinate XL (TOPROL-XL) 25 MG 24 hr tablet, Take 1 tablet by mouth Daily., Disp: 90 tablet, Rfl: 1  •  mupirocin (BACTROBAN) 2 % ointment, , Disp: , Rfl:   •  pantoprazole (Protonix) 40 MG EC tablet, Take 1 tablet by mouth Daily. (Patient taking differently: Take 40 mg by mouth Daily. As needed), Disp: 90 tablet, Rfl: 3  •  sertraline (ZOLOFT) 100 MG tablet, 2 po qd, Disp: 180 tablet, Rfl: 3         ROS  ROS      SOCIAL HX  Social History     Socioeconomic History   • Marital status:    Tobacco Use   • Smoking status: Never Smoker   • Smokeless tobacco: Never Used   Vaping Use   • Vaping Use: Former   Substance and Sexual Activity   • Alcohol use: Yes     Comment: Occasional   • Drug use: No   • Sexual activity: Yes     Partners: Male     Birth control/protection: Partner's vasectomy       FAMILY HX  Family History   Problem Relation Age of Onset   • Diabetes Paternal Grandfather    • Seizures Mother         epilespy   • No Known Problems Father    • No Known Problems Sister    • Cancer Maternal Grandmother    • Hypertension Maternal Grandfather    • Diabetes Maternal Grandfather    • Melanoma Maternal Grandfather              Meliton Hassan III, MD,  FACC

## 2022-07-05 ENCOUNTER — CONSULT (OUTPATIENT)
Dept: ONCOLOGY | Facility: CLINIC | Age: 37
End: 2022-07-05

## 2022-07-05 VITALS
RESPIRATION RATE: 18 BRPM | HEIGHT: 68 IN | DIASTOLIC BLOOD PRESSURE: 90 MMHG | TEMPERATURE: 97.3 F | OXYGEN SATURATION: 97 % | HEART RATE: 83 BPM | WEIGHT: 293 LBS | SYSTOLIC BLOOD PRESSURE: 176 MMHG | BODY MASS INDEX: 44.41 KG/M2

## 2022-07-05 DIAGNOSIS — I82.452 DEEP VENOUS THROMBOSIS (DVT) OF LEFT PERONEAL VEIN, UNSPECIFIED CHRONICITY: Primary | ICD-10-CM

## 2022-07-05 PROCEDURE — 99204 OFFICE O/P NEW MOD 45 MIN: CPT | Performed by: INTERNAL MEDICINE
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
## 2022-07-06 ENCOUNTER — PATIENT ROUNDING (BHMG ONLY) (OUTPATIENT)
Dept: ONCOLOGY | Facility: CLINIC | Age: 37
End: 2022-07-06

## 2022-07-06 NOTE — PROGRESS NOTES
A My-Chart message has been sent to the patient for PATIENT ROUNDING with [facility].

## 2022-07-07 ENCOUNTER — OFFICE VISIT (OUTPATIENT)
Dept: BARIATRICS/WEIGHT MGMT | Facility: CLINIC | Age: 37
End: 2022-07-07

## 2022-07-07 VITALS
OXYGEN SATURATION: 95 % | HEART RATE: 84 BPM | WEIGHT: 293 LBS | DIASTOLIC BLOOD PRESSURE: 88 MMHG | HEIGHT: 68 IN | SYSTOLIC BLOOD PRESSURE: 132 MMHG | BODY MASS INDEX: 44.41 KG/M2 | TEMPERATURE: 97.1 F

## 2022-07-07 DIAGNOSIS — E66.01 OBESITY, CLASS III, BMI 40-49.9 (MORBID OBESITY): Primary | ICD-10-CM

## 2022-07-07 PROCEDURE — 99213 OFFICE O/P EST LOW 20 MIN: CPT | Performed by: PHYSICIAN ASSISTANT
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
## 2022-08-03 DIAGNOSIS — R10.13 DYSPEPSIA: Primary | ICD-10-CM

## 2022-09-15 ENCOUNTER — HOSPITAL ENCOUNTER (OUTPATIENT)
Dept: GENERAL RADIOLOGY | Facility: HOSPITAL | Age: 37
Discharge: HOME | End: 2022-09-15
Admitting: PHYSICIAN ASSISTANT

## 2022-09-15 ENCOUNTER — HOSPITAL ENCOUNTER (EMERGENCY)
Facility: HOSPITAL | Age: 37
Discharge: HOME | End: 2022-09-15
Attending: EMERGENCY MEDICINE | Admitting: EMERGENCY MEDICINE

## 2022-09-15 VITALS
WEIGHT: 293 LBS | OXYGEN SATURATION: 95 % | SYSTOLIC BLOOD PRESSURE: 157 MMHG | HEIGHT: 68 IN | DIASTOLIC BLOOD PRESSURE: 98 MMHG | TEMPERATURE: 98.2 F | BODY MASS INDEX: 44.41 KG/M2 | RESPIRATION RATE: 20 BRPM | HEART RATE: 100 BPM

## 2022-09-15 DIAGNOSIS — R10.13 DYSPEPSIA: ICD-10-CM

## 2022-09-15 DIAGNOSIS — N76.4 LABIAL ABSCESS: Primary | ICD-10-CM

## 2022-09-15 PROCEDURE — 71046 X-RAY EXAM CHEST 2 VIEWS: CPT

## 2022-09-15 PROCEDURE — 93005 ELECTROCARDIOGRAM TRACING: CPT | Performed by: EMERGENCY MEDICINE

## 2022-09-15 PROCEDURE — 99282 EMERGENCY DEPT VISIT SF MDM: CPT

## 2022-09-15 RX ORDER — TRAMADOL HYDROCHLORIDE 50 MG/1
50 TABLET ORAL EVERY 6 HOURS PRN
Qty: 10 TABLET | Refills: 0 | Status: SHIPPED | OUTPATIENT
Start: 2022-09-15 | End: 2022-10-20

## 2022-09-15 RX ADMIN — LIDOCAINE HYDROCHLORIDE 10 ML: 10; .005 INJECTION, SOLUTION EPIDURAL; INFILTRATION; INTRACAUDAL; PERINEURAL at 17:30

## 2022-09-16 LAB
QT INTERVAL: 376 MS
QTC INTERVAL: 444 MS
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
## 2022-09-20 ENCOUNTER — CONSULT (OUTPATIENT)
Dept: BARIATRICS/WEIGHT MGMT | Facility: CLINIC | Age: 37
End: 2022-09-20

## 2022-09-20 VITALS
RESPIRATION RATE: 18 BRPM | WEIGHT: 293 LBS | HEART RATE: 104 BPM | SYSTOLIC BLOOD PRESSURE: 134 MMHG | OXYGEN SATURATION: 99 % | BODY MASS INDEX: 44.41 KG/M2 | HEIGHT: 68 IN | TEMPERATURE: 97.1 F | DIASTOLIC BLOOD PRESSURE: 88 MMHG

## 2022-09-20 DIAGNOSIS — E66.01 MORBID OBESITY WITH BODY MASS INDEX OF 45.0-49.9 IN ADULT: Primary | ICD-10-CM

## 2022-09-20 DIAGNOSIS — K81.1 CHRONIC CHOLECYSTITIS WITHOUT CALCULUS: ICD-10-CM

## 2022-09-20 PROCEDURE — 99214 OFFICE O/P EST MOD 30 MIN: CPT | Performed by: SURGERY

## 2022-09-20 RX ORDER — PANTOPRAZOLE SODIUM 40 MG/10ML
40 INJECTION, POWDER, LYOPHILIZED, FOR SOLUTION INTRAVENOUS ONCE
Status: CANCELLED | OUTPATIENT
Start: 2022-09-20 | End: 2022-09-20

## 2022-09-20 RX ORDER — ACETAMINOPHEN 500 MG
1000 TABLET ORAL ONCE
Status: CANCELLED | OUTPATIENT
Start: 2022-09-20 | End: 2022-09-20

## 2022-09-20 RX ORDER — ENOXAPARIN SODIUM 150 MG/ML
40 INJECTION SUBCUTANEOUS ONCE
Status: CANCELLED | OUTPATIENT
Start: 2022-09-20 | End: 2022-09-20

## 2022-09-20 RX ORDER — GABAPENTIN 100 MG/1
600 CAPSULE ORAL ONCE
Status: CANCELLED | OUTPATIENT
Start: 2022-09-20 | End: 2022-09-20

## 2022-09-20 RX ORDER — SODIUM CHLORIDE, SODIUM LACTATE, POTASSIUM CHLORIDE, CALCIUM CHLORIDE 600; 310; 30; 20 MG/100ML; MG/100ML; MG/100ML; MG/100ML
150 INJECTION, SOLUTION INTRAVENOUS CONTINUOUS
Status: CANCELLED | OUTPATIENT
Start: 2022-09-20

## 2022-09-20 RX ORDER — CHLORHEXIDINE GLUCONATE 0.12 MG/ML
30 RINSE ORAL
Status: CANCELLED | OUTPATIENT
Start: 2022-09-20 | End: 2022-09-20

## 2022-09-20 RX ORDER — SODIUM CHLORIDE 0.9 % (FLUSH) 0.9 %
3 SYRINGE (ML) INJECTION EVERY 12 HOURS SCHEDULED
Status: CANCELLED | OUTPATIENT
Start: 2022-09-20

## 2022-09-20 RX ORDER — SCOLOPAMINE TRANSDERMAL SYSTEM 1 MG/1
1 PATCH, EXTENDED RELEASE TRANSDERMAL ONCE
Status: CANCELLED | OUTPATIENT
Start: 2022-09-20 | End: 2022-09-20

## 2022-09-20 RX ORDER — SODIUM CHLORIDE 0.9 % (FLUSH) 0.9 %
3-10 SYRINGE (ML) INJECTION AS NEEDED
Status: CANCELLED | OUTPATIENT
Start: 2022-09-20
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
## 2022-09-21 PROBLEM — K81.1 CHRONIC CHOLECYSTITIS WITHOUT CALCULUS: Status: ACTIVE | Noted: 2022-09-21

## 2022-10-11 ENCOUNTER — PRE-ADMISSION TESTING (OUTPATIENT)
Dept: PREADMISSION TESTING | Facility: HOSPITAL | Age: 37
End: 2022-10-11

## 2022-10-11 DIAGNOSIS — E66.01 MORBID OBESITY WITH BODY MASS INDEX OF 45.0-49.9 IN ADULT: ICD-10-CM

## 2022-10-11 DIAGNOSIS — K81.1 CHRONIC CHOLECYSTITIS WITHOUT CALCULUS: ICD-10-CM

## 2022-10-11 LAB
ABO GROUP BLD: NORMAL
BLD GP AB SCN SERPL QL: NEGATIVE
DEPRECATED RDW RBC AUTO: 39.8 FL (ref 37–54)
ERYTHROCYTE [DISTWIDTH] IN BLOOD BY AUTOMATED COUNT: 12.9 % (ref 12.3–15.4)
HBA1C MFR BLD: 4.9 % (ref 4.8–5.6)
HCT VFR BLD AUTO: 41.8 % (ref 34–46.6)
HGB BLD-MCNC: 14.2 G/DL (ref 12–15.9)
MCH RBC QN AUTO: 29.1 PG (ref 26.6–33)
MCHC RBC AUTO-ENTMCNC: 34 G/DL (ref 31.5–35.7)
MCV RBC AUTO: 85.7 FL (ref 79–97)
MRSA DNA SPEC QL NAA+PROBE: NEGATIVE
PLATELET # BLD AUTO: 232 10*3/MM3 (ref 140–450)
PMV BLD AUTO: 8.9 FL (ref 6–12)
POTASSIUM SERPL-SCNC: 4 MMOL/L (ref 3.5–5.2)
RBC # BLD AUTO: 4.88 10*6/MM3 (ref 3.77–5.28)
RH BLD: POSITIVE
T&S EXPIRATION DATE: NORMAL
WBC NRBC COR # BLD: 7.97 10*3/MM3 (ref 3.4–10.8)

## 2022-10-11 PROCEDURE — 36415 COLL VENOUS BLD VENIPUNCTURE: CPT

## 2022-10-11 PROCEDURE — 86850 RBC ANTIBODY SCREEN: CPT

## 2022-10-11 PROCEDURE — 84132 ASSAY OF SERUM POTASSIUM: CPT

## 2022-10-11 PROCEDURE — 85027 COMPLETE CBC AUTOMATED: CPT

## 2022-10-11 PROCEDURE — 83036 HEMOGLOBIN GLYCOSYLATED A1C: CPT

## 2022-10-11 PROCEDURE — 86900 BLOOD TYPING SEROLOGIC ABO: CPT

## 2022-10-11 PROCEDURE — 87641 MR-STAPH DNA AMP PROBE: CPT

## 2022-10-11 PROCEDURE — 86901 BLOOD TYPING SEROLOGIC RH(D): CPT

## 2022-10-11 NOTE — PAT
Patient denies any current skin issues.     Patient to apply Chlorhexadine wipes  to surgical area (as instructed) the night before procedure and the AM of procedure. Wipes provided.      Patient instructed to drink 20 ounces of Gatorade and it needs to be completed 1 hour (for Main OR patients) or 2 hours (scheduled  section & [facility] patients) before given arrival time for procedure (NO RED Gatorade)    Patient verbalized understanding.  An arrival time for procedure was not provided during PAT visit. If patient had any questions or concerns about their arrival time, they were instructed to contact their surgeon/physician.  Additionally, if the patient referred to an arrival time that was acquired from their my chart account, patient was encouraged to verify that time with their surgeon/physician. Arrival times are NOT provided in Pre Admission Testing Department.    Per Anesthesia Request, patient instructed not to take their ACE/ARB medications on the AM of surgery.

## 2022-10-12 ENCOUNTER — ANESTHESIA EVENT (OUTPATIENT)
Dept: PERIOP | Facility: HOSPITAL | Age: 37
End: 2022-10-12

## 2022-10-12 RX ORDER — SODIUM CHLORIDE 0.9 % (FLUSH) 0.9 %
10 SYRINGE (ML) INJECTION AS NEEDED
Status: CANCELLED | OUTPATIENT
Start: 2022-10-12

## 2022-10-12 RX ORDER — SODIUM CHLORIDE 0.9 % (FLUSH) 0.9 %
10 SYRINGE (ML) INJECTION EVERY 12 HOURS SCHEDULED
Status: CANCELLED | OUTPATIENT
Start: 2022-10-12

## 2022-10-13 ENCOUNTER — ANESTHESIA EVENT CONVERTED (OUTPATIENT)
Dept: ANESTHESIOLOGY | Facility: HOSPITAL | Age: 37
End: 2022-10-13

## 2022-10-13 ENCOUNTER — ANESTHESIA (OUTPATIENT)
Dept: PERIOP | Facility: HOSPITAL | Age: 37
End: 2022-10-13

## 2022-10-13 ENCOUNTER — HOSPITAL ENCOUNTER (INPATIENT)
Facility: HOSPITAL | Age: 37
LOS: 1 days | Discharge: HOME | End: 2022-10-14
Attending: SURGERY | Admitting: SURGERY

## 2022-10-13 DIAGNOSIS — E66.01 MORBID OBESITY WITH BODY MASS INDEX OF 45.0-49.9 IN ADULT: ICD-10-CM

## 2022-10-13 DIAGNOSIS — R10.13 MIDEPIGASTRIC PAIN: ICD-10-CM

## 2022-10-13 DIAGNOSIS — K81.1 CHRONIC CHOLECYSTITIS WITHOUT CALCULUS: ICD-10-CM

## 2022-10-13 DIAGNOSIS — E66.01 MORBID OBESITY WITH BODY MASS INDEX (BMI) OF 45.0 TO 49.9 IN ADULT: Primary | ICD-10-CM

## 2022-10-13 LAB
B-HCG UR QL: NEGATIVE
EXPIRATION DATE: NORMAL
INTERNAL NEGATIVE CONTROL: NORMAL
INTERNAL POSITIVE CONTROL: NORMAL
Lab: NORMAL

## 2022-10-13 PROCEDURE — 25010000002 MIDAZOLAM PER 1 MG: Performed by: ANESTHESIOLOGY

## 2022-10-13 PROCEDURE — 81025 URINE PREGNANCY TEST: CPT | Performed by: SURGERY

## 2022-10-13 PROCEDURE — 25010000002 HYDRALAZINE PER 20 MG

## 2022-10-13 PROCEDURE — 25010000002 HYDROMORPHONE 1 MG/ML SOLUTION

## 2022-10-13 PROCEDURE — 94660 CPAP INITIATION&MGMT: CPT

## 2022-10-13 PROCEDURE — 25010000002 GLUCAGON (HUMAN RECOMBINANT) 1 MG RECONSTITUTED SOLUTION: Performed by: ANESTHESIOLOGY

## 2022-10-13 PROCEDURE — C9399 UNCLASSIFIED DRUGS OR BIOLOG: HCPCS | Performed by: ANESTHESIOLOGY

## 2022-10-13 PROCEDURE — 25010000002 FENTANYL CITRATE (PF) 50 MCG/ML SOLUTION: Performed by: ANESTHESIOLOGY

## 2022-10-13 PROCEDURE — 88304 TISSUE EXAM BY PATHOLOGIST: CPT | Performed by: SURGERY

## 2022-10-13 PROCEDURE — 0FT44ZZ RESECTION OF GALLBLADDER, PERCUTANEOUS ENDOSCOPIC APPROACH: ICD-10-PCS | Performed by: SURGERY

## 2022-10-13 PROCEDURE — 47562 LAPAROSCOPIC CHOLECYSTECTOMY: CPT | Performed by: SURGERY

## 2022-10-13 PROCEDURE — 25010000002 ONDANSETRON PER 1 MG: Performed by: ANESTHESIOLOGY

## 2022-10-13 PROCEDURE — 0BQT4ZZ REPAIR DIAPHRAGM, PERCUTANEOUS ENDOSCOPIC APPROACH: ICD-10-PCS | Performed by: SURGERY

## 2022-10-13 PROCEDURE — 25010000002 DEXAMETHASONE SODIUM PHOSPHATE 10 MG/ML SOLUTION: Performed by: SURGERY

## 2022-10-13 PROCEDURE — 0DB64Z3 EXCISION OF STOMACH, PERCUTANEOUS ENDOSCOPIC APPROACH, VERTICAL: ICD-10-PCS | Performed by: SURGERY

## 2022-10-13 PROCEDURE — 88307 TISSUE EXAM BY PATHOLOGIST: CPT | Performed by: SURGERY

## 2022-10-13 PROCEDURE — 25010000002 AMISULPRIDE (ANTIEMETIC) 10 MG/4ML SOLUTION: Performed by: ANESTHESIOLOGY

## 2022-10-13 PROCEDURE — 25010000002 DEXAMETHASONE PER 1 MG: Performed by: ANESTHESIOLOGY

## 2022-10-13 PROCEDURE — 25010000002 CEFAZOLIN PER 500 MG: Performed by: SURGERY

## 2022-10-13 PROCEDURE — 43775 LAP SLEEVE GASTRECTOMY: CPT | Performed by: SURGERY

## 2022-10-13 PROCEDURE — 94799 UNLISTED PULMONARY SVC/PX: CPT

## 2022-10-13 PROCEDURE — 25010000002 PROPOFOL 10 MG/ML EMULSION: Performed by: ANESTHESIOLOGY

## 2022-10-13 PROCEDURE — 25010000002 FENTANYL CITRATE (PF) 50 MCG/ML SOLUTION

## 2022-10-13 PROCEDURE — 25010000002 ENOXAPARIN PER 10 MG: Performed by: SURGERY

## 2022-10-13 PROCEDURE — 0DJ08ZZ INSPECTION OF UPPER INTESTINAL TRACT, VIA NATURAL OR ARTIFICIAL OPENING ENDOSCOPIC: ICD-10-PCS | Performed by: SURGERY

## 2022-10-13 DEVICE — ABSORBABLE WOUND CLOSURE DEVICE
Type: IMPLANTABLE DEVICE | Site: ABDOMEN | Status: FUNCTIONAL
Brand: SYNETURE

## 2022-10-13 DEVICE — IMPLANTABLE DEVICE
Type: IMPLANTABLE DEVICE | Site: ABDOMEN | Status: FUNCTIONAL
Brand: TITAN SGS STANDARD GASTRIC STAPLER

## 2022-10-13 DEVICE — LIGAMAX 5 MM ENDOSCOPIC MULTIPLE CLIP APPLIER
Type: IMPLANTABLE DEVICE | Site: BILE DUCT | Status: FUNCTIONAL
Brand: LIGAMAX

## 2022-10-13 DEVICE — SEALANT WND FIBRIN TISSEEL PREFIL/SYR/PRIMAFZ 10ML: Type: IMPLANTABLE DEVICE | Site: ABDOMEN | Status: FUNCTIONAL

## 2022-10-13 DEVICE — ABSORBABLE WOUND CLOSURE DEVICE
Type: IMPLANTABLE DEVICE | Site: ABDOMEN | Status: FUNCTIONAL
Brand: V-LOC 180

## 2022-10-13 RX ORDER — LABETALOL HYDROCHLORIDE 5 MG/ML
5 INJECTION, SOLUTION INTRAVENOUS
Status: DISCONTINUED | OUTPATIENT
Start: 2022-10-13 | End: 2022-10-13 | Stop reason: HOSPADM

## 2022-10-13 RX ORDER — ACETAMINOPHEN 160 MG/5ML
1000 SOLUTION ORAL EVERY 8 HOURS SCHEDULED
Status: DISCONTINUED | OUTPATIENT
Start: 2022-10-13 | End: 2022-10-14 | Stop reason: HOSPADM

## 2022-10-13 RX ORDER — MIDAZOLAM HYDROCHLORIDE 1 MG/ML
1 INJECTION INTRAMUSCULAR; INTRAVENOUS
Status: DISCONTINUED | OUTPATIENT
Start: 2022-10-13 | End: 2022-10-13 | Stop reason: HOSPADM

## 2022-10-13 RX ORDER — FENTANYL CITRATE 50 UG/ML
INJECTION, SOLUTION INTRAMUSCULAR; INTRAVENOUS
Status: COMPLETED
Start: 2022-10-13 | End: 2022-10-13

## 2022-10-13 RX ORDER — ONDANSETRON 2 MG/ML
4 INJECTION INTRAMUSCULAR; INTRAVENOUS ONCE AS NEEDED
Status: DISCONTINUED | OUTPATIENT
Start: 2022-10-13 | End: 2022-10-13 | Stop reason: HOSPADM

## 2022-10-13 RX ORDER — NALOXONE HCL 0.4 MG/ML
0.4 VIAL (ML) INJECTION
Status: DISCONTINUED | OUTPATIENT
Start: 2022-10-13 | End: 2022-10-14 | Stop reason: HOSPADM

## 2022-10-13 RX ORDER — CYANOCOBALAMIN 1000 UG/ML
1000 INJECTION, SOLUTION INTRAMUSCULAR; SUBCUTANEOUS ONCE
Status: COMPLETED | OUTPATIENT
Start: 2022-10-14 | End: 2022-10-14

## 2022-10-13 RX ORDER — LISINOPRIL 40 MG/1
40 TABLET ORAL DAILY
Status: DISCONTINUED | OUTPATIENT
Start: 2022-10-13 | End: 2022-10-14 | Stop reason: HOSPADM

## 2022-10-13 RX ORDER — ENOXAPARIN SODIUM 100 MG/ML
40 INJECTION SUBCUTANEOUS ONCE
Status: DISCONTINUED | OUTPATIENT
Start: 2022-10-13 | End: 2022-10-13 | Stop reason: HOSPADM

## 2022-10-13 RX ORDER — DROPERIDOL 2.5 MG/ML
0.62 INJECTION, SOLUTION INTRAMUSCULAR; INTRAVENOUS
Status: DISCONTINUED | OUTPATIENT
Start: 2022-10-13 | End: 2022-10-13 | Stop reason: HOSPADM

## 2022-10-13 RX ORDER — PROPOFOL 10 MG/ML
VIAL (ML) INTRAVENOUS AS NEEDED
Status: DISCONTINUED | OUTPATIENT
Start: 2022-10-13 | End: 2022-10-13 | Stop reason: SURG

## 2022-10-13 RX ORDER — ROCURONIUM BROMIDE 10 MG/ML
INJECTION, SOLUTION INTRAVENOUS AS NEEDED
Status: DISCONTINUED | OUTPATIENT
Start: 2022-10-13 | End: 2022-10-13 | Stop reason: SURG

## 2022-10-13 RX ORDER — FIBRINOGEN HUMAN AND THROMBIN HUMAN 10 ML
KIT TOPICAL AS NEEDED
Status: DISCONTINUED | OUTPATIENT
Start: 2022-10-13 | End: 2022-10-13 | Stop reason: HOSPADM

## 2022-10-13 RX ORDER — CHLORHEXIDINE GLUCONATE 0.12 MG/ML
30 RINSE ORAL
Status: ACTIVE | OUTPATIENT
Start: 2022-10-13 | End: 2022-10-13

## 2022-10-13 RX ORDER — SCOLOPAMINE TRANSDERMAL SYSTEM 1 MG/1
1 PATCH, EXTENDED RELEASE TRANSDERMAL ONCE
Status: DISCONTINUED | OUTPATIENT
Start: 2022-10-13 | End: 2022-10-14 | Stop reason: HOSPADM

## 2022-10-13 RX ORDER — HYDROMORPHONE HYDROCHLORIDE 2 MG/1
2 TABLET ORAL EVERY 4 HOURS PRN
Status: DISCONTINUED | OUTPATIENT
Start: 2022-10-13 | End: 2022-10-14 | Stop reason: HOSPADM

## 2022-10-13 RX ORDER — ONDANSETRON 4 MG/1
4 TABLET, FILM COATED ORAL EVERY 4 HOURS PRN
Status: DISCONTINUED | OUTPATIENT
Start: 2022-10-13 | End: 2022-10-13 | Stop reason: SDUPTHER

## 2022-10-13 RX ORDER — LABETALOL HYDROCHLORIDE 5 MG/ML
INJECTION, SOLUTION INTRAVENOUS AS NEEDED
Status: DISCONTINUED | OUTPATIENT
Start: 2022-10-13 | End: 2022-10-13 | Stop reason: SURG

## 2022-10-13 RX ORDER — DIPHENHYDRAMINE HYDROCHLORIDE 50 MG/ML
25 INJECTION INTRAMUSCULAR; INTRAVENOUS EVERY 4 HOURS PRN
Status: DISCONTINUED | OUTPATIENT
Start: 2022-10-13 | End: 2022-10-14 | Stop reason: HOSPADM

## 2022-10-13 RX ORDER — SODIUM CHLORIDE 9 MG/ML
INJECTION, SOLUTION INTRAVENOUS AS NEEDED
Status: DISCONTINUED | OUTPATIENT
Start: 2022-10-13 | End: 2022-10-13 | Stop reason: HOSPADM

## 2022-10-13 RX ORDER — TRANEXAMIC ACID 10 MG/ML
1000 INJECTION, SOLUTION INTRAVENOUS ONCE
Status: COMPLETED | OUTPATIENT
Start: 2022-10-13 | End: 2022-10-13

## 2022-10-13 RX ORDER — GLYCOPYRROLATE 0.2 MG/ML
INJECTION INTRAMUSCULAR; INTRAVENOUS AS NEEDED
Status: DISCONTINUED | OUTPATIENT
Start: 2022-10-13 | End: 2022-10-13 | Stop reason: SURG

## 2022-10-13 RX ORDER — SERTRALINE HYDROCHLORIDE 100 MG/1
200 TABLET, FILM COATED ORAL DAILY
Status: DISCONTINUED | OUTPATIENT
Start: 2022-10-14 | End: 2022-10-14 | Stop reason: HOSPADM

## 2022-10-13 RX ORDER — SODIUM CHLORIDE, SODIUM LACTATE, POTASSIUM CHLORIDE, CALCIUM CHLORIDE 600; 310; 30; 20 MG/100ML; MG/100ML; MG/100ML; MG/100ML
9 INJECTION, SOLUTION INTRAVENOUS CONTINUOUS
Status: DISCONTINUED | OUTPATIENT
Start: 2022-10-13 | End: 2022-10-14 | Stop reason: HOSPADM

## 2022-10-13 RX ORDER — LORAZEPAM 2 MG/ML
0.5 INJECTION INTRAMUSCULAR EVERY 12 HOURS PRN
Status: DISCONTINUED | OUTPATIENT
Start: 2022-10-13 | End: 2022-10-14 | Stop reason: HOSPADM

## 2022-10-13 RX ORDER — MAGNESIUM HYDROXIDE 1200 MG/15ML
LIQUID ORAL AS NEEDED
Status: DISCONTINUED | OUTPATIENT
Start: 2022-10-13 | End: 2022-10-13 | Stop reason: HOSPADM

## 2022-10-13 RX ORDER — SODIUM CHLORIDE 0.9 % (FLUSH) 0.9 %
3 SYRINGE (ML) INJECTION EVERY 12 HOURS SCHEDULED
Status: DISCONTINUED | OUTPATIENT
Start: 2022-10-13 | End: 2022-10-13 | Stop reason: HOSPADM

## 2022-10-13 RX ORDER — SODIUM CHLORIDE AND POTASSIUM CHLORIDE 150; 450 MG/100ML; MG/100ML
125 INJECTION, SOLUTION INTRAVENOUS CONTINUOUS
Status: DISCONTINUED | OUTPATIENT
Start: 2022-10-14 | End: 2022-10-14 | Stop reason: HOSPADM

## 2022-10-13 RX ORDER — DEXAMETHASONE SODIUM PHOSPHATE 10 MG/ML
INJECTION, SOLUTION INTRAMUSCULAR; INTRAVENOUS
Status: COMPLETED | OUTPATIENT
Start: 2022-10-13 | End: 2022-10-13

## 2022-10-13 RX ORDER — METOPROLOL SUCCINATE 25 MG/1
25 TABLET, EXTENDED RELEASE ORAL DAILY
Status: DISCONTINUED | OUTPATIENT
Start: 2022-10-14 | End: 2022-10-14 | Stop reason: HOSPADM

## 2022-10-13 RX ORDER — NALOXONE HCL 0.4 MG/ML
0.4 VIAL (ML) INJECTION AS NEEDED
Status: DISCONTINUED | OUTPATIENT
Start: 2022-10-13 | End: 2022-10-13 | Stop reason: HOSPADM

## 2022-10-13 RX ORDER — ENOXAPARIN SODIUM 100 MG/ML
INJECTION SUBCUTANEOUS AS NEEDED
Status: DISCONTINUED | OUTPATIENT
Start: 2022-10-13 | End: 2022-10-13 | Stop reason: HOSPADM

## 2022-10-13 RX ORDER — HYDRALAZINE HYDROCHLORIDE 20 MG/ML
5 INJECTION INTRAMUSCULAR; INTRAVENOUS
Status: DISCONTINUED | OUTPATIENT
Start: 2022-10-13 | End: 2022-10-13 | Stop reason: HOSPADM

## 2022-10-13 RX ORDER — OXYCODONE HYDROCHLORIDE 5 MG/1
5 TABLET ORAL EVERY 6 HOURS PRN
Status: DISCONTINUED | OUTPATIENT
Start: 2022-10-13 | End: 2022-10-14 | Stop reason: HOSPADM

## 2022-10-13 RX ORDER — ALPRAZOLAM 0.25 MG/1
0.25 TABLET ORAL ONCE AS NEEDED
Status: DISCONTINUED | OUTPATIENT
Start: 2022-10-13 | End: 2022-10-13 | Stop reason: SDUPTHER

## 2022-10-13 RX ORDER — PANTOPRAZOLE SODIUM 40 MG/10ML
40 INJECTION, POWDER, LYOPHILIZED, FOR SOLUTION INTRAVENOUS
Status: DISCONTINUED | OUTPATIENT
Start: 2022-10-14 | End: 2022-10-14 | Stop reason: HOSPADM

## 2022-10-13 RX ORDER — LIDOCAINE HYDROCHLORIDE 10 MG/ML
0.5 INJECTION, SOLUTION EPIDURAL; INFILTRATION; INTRACAUDAL; PERINEURAL ONCE AS NEEDED
Status: COMPLETED | OUTPATIENT
Start: 2022-10-13 | End: 2022-10-13

## 2022-10-13 RX ORDER — DEXAMETHASONE SODIUM PHOSPHATE 10 MG/ML
INJECTION INTRAMUSCULAR; INTRAVENOUS AS NEEDED
Status: DISCONTINUED | OUTPATIENT
Start: 2022-10-13 | End: 2022-10-13 | Stop reason: SURG

## 2022-10-13 RX ORDER — PROCHLORPERAZINE MALEATE 10 MG
10 TABLET ORAL EVERY 6 HOURS PRN
Status: DISCONTINUED | OUTPATIENT
Start: 2022-10-13 | End: 2022-10-14 | Stop reason: HOSPADM

## 2022-10-13 RX ORDER — CEFAZOLIN SODIUM IN 0.9 % NACL 3 G/100 ML
3 INTRAVENOUS SOLUTION, PIGGYBACK (ML) INTRAVENOUS ONCE
Status: COMPLETED | OUTPATIENT
Start: 2022-10-13 | End: 2022-10-13

## 2022-10-13 RX ORDER — PROMETHAZINE HYDROCHLORIDE 25 MG/1
25 SUPPOSITORY RECTAL ONCE AS NEEDED
Status: DISCONTINUED | OUTPATIENT
Start: 2022-10-13 | End: 2022-10-13 | Stop reason: HOSPADM

## 2022-10-13 RX ORDER — BUPIVACAINE HYDROCHLORIDE 2.5 MG/ML
INJECTION, SOLUTION EPIDURAL; INFILTRATION; INTRACAUDAL
Status: COMPLETED | OUTPATIENT
Start: 2022-10-13 | End: 2022-10-13

## 2022-10-13 RX ORDER — ONDANSETRON 2 MG/ML
INJECTION INTRAMUSCULAR; INTRAVENOUS AS NEEDED
Status: DISCONTINUED | OUTPATIENT
Start: 2022-10-13 | End: 2022-10-13 | Stop reason: SURG

## 2022-10-13 RX ORDER — ONDANSETRON 2 MG/ML
4 INJECTION INTRAMUSCULAR; INTRAVENOUS EVERY 4 HOURS PRN
Status: DISCONTINUED | OUTPATIENT
Start: 2022-10-13 | End: 2022-10-14 | Stop reason: HOSPADM

## 2022-10-13 RX ORDER — ACETAMINOPHEN 500 MG
1000 TABLET ORAL EVERY 8 HOURS SCHEDULED
Status: DISCONTINUED | OUTPATIENT
Start: 2022-10-13 | End: 2022-10-14 | Stop reason: HOSPADM

## 2022-10-13 RX ORDER — MORPHINE SULFATE 4 MG/ML
4 INJECTION, SOLUTION INTRAMUSCULAR; INTRAVENOUS
Status: DISCONTINUED | OUTPATIENT
Start: 2022-10-13 | End: 2022-10-14 | Stop reason: HOSPADM

## 2022-10-13 RX ORDER — CEFAZOLIN SODIUM IN 0.9 % NACL 3 G/100 ML
3 INTRAVENOUS SOLUTION, PIGGYBACK (ML) INTRAVENOUS EVERY 8 HOURS
Status: COMPLETED | OUTPATIENT
Start: 2022-10-13 | End: 2022-10-14

## 2022-10-13 RX ORDER — SODIUM CHLORIDE, SODIUM LACTATE, POTASSIUM CHLORIDE, CALCIUM CHLORIDE 600; 310; 30; 20 MG/100ML; MG/100ML; MG/100ML; MG/100ML
150 INJECTION, SOLUTION INTRAVENOUS CONTINUOUS
Status: DISCONTINUED | OUTPATIENT
Start: 2022-10-13 | End: 2022-10-14 | Stop reason: HOSPADM

## 2022-10-13 RX ORDER — SIMETHICONE 80 MG
80 TABLET,CHEWABLE ORAL 4 TIMES DAILY PRN
Status: DISCONTINUED | OUTPATIENT
Start: 2022-10-13 | End: 2022-10-14 | Stop reason: HOSPADM

## 2022-10-13 RX ORDER — HYDRALAZINE HYDROCHLORIDE 20 MG/ML
INJECTION INTRAMUSCULAR; INTRAVENOUS
Status: COMPLETED
Start: 2022-10-13 | End: 2022-10-13

## 2022-10-13 RX ORDER — PROMETHAZINE HYDROCHLORIDE 12.5 MG/1
12.5 TABLET ORAL EVERY 6 HOURS PRN
Status: DISCONTINUED | OUTPATIENT
Start: 2022-10-13 | End: 2022-10-14 | Stop reason: HOSPADM

## 2022-10-13 RX ORDER — SODIUM CHLORIDE 0.9 % (FLUSH) 0.9 %
3-10 SYRINGE (ML) INJECTION AS NEEDED
Status: DISCONTINUED | OUTPATIENT
Start: 2022-10-13 | End: 2022-10-13 | Stop reason: HOSPADM

## 2022-10-13 RX ORDER — PANTOPRAZOLE SODIUM 40 MG/10ML
40 INJECTION, POWDER, LYOPHILIZED, FOR SOLUTION INTRAVENOUS ONCE
Status: COMPLETED | OUTPATIENT
Start: 2022-10-13 | End: 2022-10-13

## 2022-10-13 RX ORDER — DROPERIDOL 2.5 MG/ML
0.62 INJECTION, SOLUTION INTRAMUSCULAR; INTRAVENOUS ONCE AS NEEDED
Status: DISCONTINUED | OUTPATIENT
Start: 2022-10-13 | End: 2022-10-13 | Stop reason: HOSPADM

## 2022-10-13 RX ORDER — ACETAMINOPHEN 500 MG
1000 TABLET ORAL ONCE
Status: COMPLETED | OUTPATIENT
Start: 2022-10-13 | End: 2022-10-13

## 2022-10-13 RX ORDER — MEPERIDINE HYDROCHLORIDE 25 MG/ML
12.5 INJECTION INTRAMUSCULAR; INTRAVENOUS; SUBCUTANEOUS
Status: DISCONTINUED | OUTPATIENT
Start: 2022-10-13 | End: 2022-10-13 | Stop reason: HOSPADM

## 2022-10-13 RX ORDER — ONDANSETRON 4 MG/1
4 TABLET, FILM COATED ORAL EVERY 6 HOURS PRN
Status: DISCONTINUED | OUTPATIENT
Start: 2022-10-17 | End: 2022-10-14 | Stop reason: HOSPADM

## 2022-10-13 RX ORDER — BUSPIRONE HYDROCHLORIDE 10 MG/1
10 TABLET ORAL 2 TIMES DAILY
Status: DISCONTINUED | OUTPATIENT
Start: 2022-10-13 | End: 2022-10-14 | Stop reason: HOSPADM

## 2022-10-13 RX ORDER — HYDRALAZINE HYDROCHLORIDE 20 MG/ML
10 INJECTION INTRAMUSCULAR; INTRAVENOUS
Status: DISCONTINUED | OUTPATIENT
Start: 2022-10-13 | End: 2022-10-14 | Stop reason: HOSPADM

## 2022-10-13 RX ORDER — GABAPENTIN 100 MG/1
100 CAPSULE ORAL 3 TIMES DAILY
Status: DISCONTINUED | OUTPATIENT
Start: 2022-10-13 | End: 2022-10-14 | Stop reason: HOSPADM

## 2022-10-13 RX ORDER — FENTANYL CITRATE 50 UG/ML
50 INJECTION, SOLUTION INTRAMUSCULAR; INTRAVENOUS
Status: DISCONTINUED | OUTPATIENT
Start: 2022-10-13 | End: 2022-10-13 | Stop reason: HOSPADM

## 2022-10-13 RX ORDER — IPRATROPIUM BROMIDE AND ALBUTEROL SULFATE 2.5; .5 MG/3ML; MG/3ML
3 SOLUTION RESPIRATORY (INHALATION) ONCE AS NEEDED
Status: DISCONTINUED | OUTPATIENT
Start: 2022-10-13 | End: 2022-10-13 | Stop reason: HOSPADM

## 2022-10-13 RX ORDER — FENTANYL CITRATE 50 UG/ML
INJECTION, SOLUTION INTRAMUSCULAR; INTRAVENOUS AS NEEDED
Status: DISCONTINUED | OUTPATIENT
Start: 2022-10-13 | End: 2022-10-13 | Stop reason: SURG

## 2022-10-13 RX ORDER — PROMETHAZINE HYDROCHLORIDE 25 MG/1
25 TABLET ORAL ONCE AS NEEDED
Status: DISCONTINUED | OUTPATIENT
Start: 2022-10-13 | End: 2022-10-13 | Stop reason: HOSPADM

## 2022-10-13 RX ORDER — GABAPENTIN 250 MG/5ML
100 SOLUTION ORAL 3 TIMES DAILY
Status: DISCONTINUED | OUTPATIENT
Start: 2022-10-13 | End: 2022-10-14 | Stop reason: HOSPADM

## 2022-10-13 RX ORDER — NALOXONE HCL 0.4 MG/ML
0.1 VIAL (ML) INJECTION
Status: DISCONTINUED | OUTPATIENT
Start: 2022-10-13 | End: 2022-10-14 | Stop reason: HOSPADM

## 2022-10-13 RX ORDER — HYDROMORPHONE HYDROCHLORIDE 1 MG/ML
0.5 INJECTION, SOLUTION INTRAMUSCULAR; INTRAVENOUS; SUBCUTANEOUS
Status: DISCONTINUED | OUTPATIENT
Start: 2022-10-13 | End: 2022-10-13 | Stop reason: HOSPADM

## 2022-10-13 RX ORDER — BUPIVACAINE HYDROCHLORIDE AND EPINEPHRINE 5; 5 MG/ML; UG/ML
INJECTION, SOLUTION PERINEURAL AS NEEDED
Status: DISCONTINUED | OUTPATIENT
Start: 2022-10-13 | End: 2022-10-13 | Stop reason: HOSPADM

## 2022-10-13 RX ORDER — GABAPENTIN 300 MG/1
600 CAPSULE ORAL ONCE
Status: COMPLETED | OUTPATIENT
Start: 2022-10-13 | End: 2022-10-13

## 2022-10-13 RX ORDER — LORAZEPAM 1 MG/1
1 TABLET ORAL EVERY 12 HOURS PRN
Status: DISCONTINUED | OUTPATIENT
Start: 2022-10-13 | End: 2022-10-14 | Stop reason: HOSPADM

## 2022-10-13 RX ORDER — HYDROCODONE BITARTRATE AND ACETAMINOPHEN 5; 325 MG/1; MG/1
1 TABLET ORAL ONCE AS NEEDED
Status: DISCONTINUED | OUTPATIENT
Start: 2022-10-13 | End: 2022-10-13 | Stop reason: HOSPADM

## 2022-10-13 RX ORDER — LABETALOL HYDROCHLORIDE 5 MG/ML
INJECTION, SOLUTION INTRAVENOUS
Status: COMPLETED
Start: 2022-10-13 | End: 2022-10-13

## 2022-10-13 RX ORDER — ALBUTEROL SULFATE 2.5 MG/3ML
2.5 SOLUTION RESPIRATORY (INHALATION) EVERY 4 HOURS PRN
Status: DISCONTINUED | OUTPATIENT
Start: 2022-10-13 | End: 2022-10-14 | Stop reason: HOSPADM

## 2022-10-13 RX ORDER — ENOXAPARIN SODIUM 100 MG/ML
40 INJECTION SUBCUTANEOUS DAILY
Status: DISCONTINUED | OUTPATIENT
Start: 2022-10-14 | End: 2022-10-14

## 2022-10-13 RX ADMIN — FENTANYL CITRATE 50 MCG: 50 INJECTION, SOLUTION INTRAMUSCULAR; INTRAVENOUS at 14:50

## 2022-10-13 RX ADMIN — PANTOPRAZOLE SODIUM 40 MG: 40 INJECTION, POWDER, FOR SOLUTION INTRAVENOUS at 09:31

## 2022-10-13 RX ADMIN — ROCURONIUM BROMIDE 10 MG: 50 INJECTION, SOLUTION INTRAVENOUS at 13:15

## 2022-10-13 RX ADMIN — LIDOCAINE HYDROCHLORIDE 0.5 ML: 10 INJECTION, SOLUTION EPIDURAL; INFILTRATION; INTRACAUDAL; PERINEURAL at 09:30

## 2022-10-13 RX ADMIN — SODIUM CHLORIDE, POTASSIUM CHLORIDE, SODIUM LACTATE AND CALCIUM CHLORIDE 1000 ML: 600; 310; 30; 20 INJECTION, SOLUTION INTRAVENOUS at 09:30

## 2022-10-13 RX ADMIN — GABAPENTIN 100 MG: 100 CAPSULE ORAL at 16:47

## 2022-10-13 RX ADMIN — CEFAZOLIN 3 G: 10 INJECTION, POWDER, FOR SOLUTION INTRAVENOUS at 20:25

## 2022-10-13 RX ADMIN — Medication 3 G: at 11:46

## 2022-10-13 RX ADMIN — MIDAZOLAM 1 MG: 1 INJECTION INTRAMUSCULAR; INTRAVENOUS at 11:19

## 2022-10-13 RX ADMIN — ROCURONIUM BROMIDE 20 MG: 50 INJECTION, SOLUTION INTRAVENOUS at 12:20

## 2022-10-13 RX ADMIN — GLUCAGON HYDROCHLORIDE 1 MG: KIT at 12:41

## 2022-10-13 RX ADMIN — ROCURONIUM BROMIDE 10 MG: 50 INJECTION, SOLUTION INTRAVENOUS at 12:51

## 2022-10-13 RX ADMIN — SODIUM CHLORIDE, POTASSIUM CHLORIDE, SODIUM LACTATE AND CALCIUM CHLORIDE 150 ML/HR: 600; 310; 30; 20 INJECTION, SOLUTION INTRAVENOUS at 23:43

## 2022-10-13 RX ADMIN — HYDROMORPHONE HYDROCHLORIDE 0.5 MG: 1 INJECTION, SOLUTION INTRAMUSCULAR; INTRAVENOUS; SUBCUTANEOUS at 15:20

## 2022-10-13 RX ADMIN — TRANEXAMIC ACID 1000 MG: 10 INJECTION, SOLUTION INTRAVENOUS at 13:01

## 2022-10-13 RX ADMIN — AMISULPRIDE 10 MG: 2.5 INJECTION, SOLUTION INTRAVENOUS at 13:54

## 2022-10-13 RX ADMIN — HYDROMORPHONE HYDROCHLORIDE 0.5 MG: 1 INJECTION, SOLUTION INTRAMUSCULAR; INTRAVENOUS; SUBCUTANEOUS at 15:05

## 2022-10-13 RX ADMIN — ONDANSETRON 4 MG: 2 INJECTION INTRAMUSCULAR; INTRAVENOUS at 13:54

## 2022-10-13 RX ADMIN — ACETAMINOPHEN 1000 MG: 500 TABLET, FILM COATED ORAL at 16:47

## 2022-10-13 RX ADMIN — GABAPENTIN 100 MG: 100 CAPSULE ORAL at 20:25

## 2022-10-13 RX ADMIN — FENTANYL CITRATE 100 MCG: 50 INJECTION, SOLUTION INTRAMUSCULAR; INTRAVENOUS at 11:51

## 2022-10-13 RX ADMIN — LISINOPRIL 40 MG: 40 TABLET ORAL at 16:47

## 2022-10-13 RX ADMIN — BUSPIRONE HYDROCHLORIDE 10 MG: 10 TABLET ORAL at 20:25

## 2022-10-13 RX ADMIN — PROPOFOL 25 MG: 10 INJECTION, EMULSION INTRAVENOUS at 14:07

## 2022-10-13 RX ADMIN — ACETAMINOPHEN 1000 MG: 500 TABLET, FILM COATED ORAL at 21:40

## 2022-10-13 RX ADMIN — FENTANYL CITRATE 100 MCG: 50 INJECTION, SOLUTION INTRAMUSCULAR; INTRAVENOUS at 12:47

## 2022-10-13 RX ADMIN — ACETAMINOPHEN 1000 MG: 500 TABLET, FILM COATED ORAL at 09:29

## 2022-10-13 RX ADMIN — CHLORHEXIDINE GLUCONATE 0.12% ORAL RINSE 30 ML: 1.2 LIQUID ORAL at 09:31

## 2022-10-13 RX ADMIN — LABETALOL 20 MG/4 ML (5 MG/ML) INTRAVENOUS SYRINGE 5 MG: at 12:50

## 2022-10-13 RX ADMIN — GLYCOPYRROLATE 0.2 MG: 0.2 INJECTION INTRAMUSCULAR; INTRAVENOUS at 12:11

## 2022-10-13 RX ADMIN — PROPOFOL 25 MCG/KG/MIN: 10 INJECTION, EMULSION INTRAVENOUS at 11:55

## 2022-10-13 RX ADMIN — FENTANYL CITRATE 50 MCG: 50 INJECTION, SOLUTION INTRAMUSCULAR; INTRAVENOUS at 15:41

## 2022-10-13 RX ADMIN — BUPIVACAINE HYDROCHLORIDE 5 ML: 2.5 INJECTION, SOLUTION EPIDURAL; INFILTRATION; INTRACAUDAL at 11:57

## 2022-10-13 RX ADMIN — ROCURONIUM BROMIDE 50 MG: 50 INJECTION, SOLUTION INTRAVENOUS at 11:51

## 2022-10-13 RX ADMIN — SCOPALAMINE 1 PATCH: 1 PATCH, EXTENDED RELEASE TRANSDERMAL at 09:30

## 2022-10-13 RX ADMIN — SODIUM CHLORIDE, POTASSIUM CHLORIDE, SODIUM LACTATE AND CALCIUM CHLORIDE: 600; 310; 30; 20 INJECTION, SOLUTION INTRAVENOUS at 11:46

## 2022-10-13 RX ADMIN — ROCURONIUM BROMIDE 10 MG: 50 INJECTION, SOLUTION INTRAVENOUS at 13:40

## 2022-10-13 RX ADMIN — DEXAMETHASONE SODIUM PHOSPHATE 2 MG: 10 INJECTION, SOLUTION INTRAMUSCULAR; INTRAVENOUS at 11:57

## 2022-10-13 RX ADMIN — LABETALOL HYDROCHLORIDE 5 MG: 5 INJECTION, SOLUTION INTRAVENOUS at 15:02

## 2022-10-13 RX ADMIN — GABAPENTIN 600 MG: 300 CAPSULE ORAL at 09:29

## 2022-10-13 RX ADMIN — LABETALOL HYDROCHLORIDE 5 MG: 5 INJECTION, SOLUTION INTRAVENOUS at 15:17

## 2022-10-13 RX ADMIN — HYDRALAZINE HYDROCHLORIDE 5 MG: 20 INJECTION INTRAMUSCULAR; INTRAVENOUS at 15:36

## 2022-10-13 RX ADMIN — SODIUM CHLORIDE, POTASSIUM CHLORIDE, SODIUM LACTATE AND CALCIUM CHLORIDE 150 ML/HR: 600; 310; 30; 20 INJECTION, SOLUTION INTRAVENOUS at 11:19

## 2022-10-13 RX ADMIN — SODIUM CHLORIDE, POTASSIUM CHLORIDE, SODIUM LACTATE AND CALCIUM CHLORIDE: 600; 310; 30; 20 INJECTION, SOLUTION INTRAVENOUS at 14:33

## 2022-10-13 RX ADMIN — PROPOFOL 200 MG: 10 INJECTION, EMULSION INTRAVENOUS at 11:51

## 2022-10-13 RX ADMIN — DEXAMETHASONE SODIUM PHOSPHATE 6 MG: 10 INJECTION INTRAMUSCULAR; INTRAVENOUS at 13:15

## 2022-10-13 RX ADMIN — LABETALOL 20 MG/4 ML (5 MG/ML) INTRAVENOUS SYRINGE 5 MG: at 13:38

## 2022-10-13 NOTE — BRIEF OP NOTE
GASTRIC SLEEVE LAPAROSCOPIC, ESOPHAGOGASTRODUODENOSCOPY, HIATAL HERNIA REPAIR LAPAROSCOPIC  Progress Note    Dianna Friedman Sascha  10/13/2022    Pre-op Diagnosis:   Morbid obesity with body mass index of 45.0-49.9 in adult ([facility]) [E66.01, Z68.42]  Chronic cholecystitis without calculus [K81.1]       Post-Op Diagnosis Codes:     * Morbid obesity with body mass index of 45.0-49.9 in adult ([facility]) [E66.01, Z68.42]     * Chronic cholecystitis without calculus [K81.1]     * Hiatal hernia with gastroesophageal reflux [K44.9, K21.9]    Procedure/CPT® Codes:  ID LAP, FORTINO RESTRICT PROC, LONGITUDINAL GASTRECTOMY [08239]  ID LAP,CHOLECYSTECTOMY [[id number]]  ID LAP,ESOPHAGOGAST FUNDOPLASTY [[id number]]  ID ESOPHAGOGASTRODUODENOSCOPY TRANSORAL DIAGNOSTIC [[id number]]      Procedure(s):  GASTRIC SLEEVE LAPAROSCOPIC  CHOLECYSTECTOMY LAPAROSCOPIC  HIATAL HERNIA REPAIR LAPAROSCOPIC  ESOPHAGOGASTRODUODENOSCOPY        Surgeon(s):  Maurice Mittal MD Weiss, George Derek, MD    Anesthesia: General with Block    Staff:   Circulator: Lea Haque RN; Johnna Caicedo RN  Scrub Person: Alpa Holliday; Marti Leonard  Nursing Assistant: Jami Sibley CNA; Linda Thakur         Estimated Blood Loss: 100ml    Urine Voided: * No values recorded between 10/13/2022 11:46 AM and 10/13/2022  2:03 PM *    Specimens:                Specimens     ID Source Type Tests Collected By Collected At Frozen?    A Gallbladder Tissue · TISSUE PATHOLOGY EXAM   Maurice Mittal MD 10/13/22 1256     B Stomach Tissue · TISSUE PATHOLOGY EXAM   Maurice Mittal MD 10/13/22 1256 No    Description: SUB-TOTAL GASTRECTOMY                Drains: * No LDAs found *    Findings:         Complications: none          Maurice Mittal MD     Date: 10/13/2022  Time: 14:03 EDT

## 2022-10-13 NOTE — OP NOTE
Preoperative Diagnosis:   Morbid obesity (141 kg, BMI 47.21) with Multiple Co-Morbidities, chronic cholecystitis without cholelithiasis    Postoperative Diagnosis:   Same, hiatal hernia with GE reflux    Procedure:                                                      Laparoscopic Sleeve Gastrectomy (85% subtotal vertical gastrectomy, Titan ([facility])                             Laparoscopic cholecystectomy                                  Laparoscopic hiatal hernia repair (not paraesophageal)                                                                                                                     EGD                                                                       Surgeon:                                                       KRYS Mittal MD    Anesthesia:                                                   GETA    EBL:                                                              Minimal    Fluids:                                                           Crystalloid    Specimens:                                                   Subtotal gastrectomy, gallbladder    Drains:                                                           None    Counts:                                                          Correct    Complications:                                               None    Indications:   This is a 37 year-old morbidly obese female who presents for elective laparoscopic sleeve gastrectomy, cholecystectomy, and EGD.  She has heartburn for which she takes PPI once or twice a month.  Preoperative EGD showed a week hiatus, Z-line 40 cm, distal esophageal biopsies with changes of reflux esophagitis.  She's undergone our extensive preoperative education teaching and consent process everything's in order and she wishes to proceed.      Operative technique:     The patient was brought to the operating room, and placed supine upon the operating room table. SCD hose were placed, she underwent uneventful  general endotracheal anesthesia per the anesthesiology staff, she received IV Ancef, and subcutaneous Lovenox, the anesthesiology staff performed a tap block, and her abdomen was prepped and draped with ChloraPrep in a sterile fashion, an Ioban was used as well, a Blackwell catheter was not placed.    The peritoneal cavity was entered in the left upper quadrant using an 11 mm fascial splitting trocar utilizing an Optiview technique and the abdomen was insufflated to a pressure of 15 mmHg with CO2 gas.  Exploratory laparoscopy revealed no evidence of injury from the entrance technique, an enlarged smooth fatty liver, moderate rectus diastases.    Remaining trocars were placed under direct visualization including 5 mm trochars in the right (lower than usual to compensate for the hepatomegaly), mid, and left lateral abdomen and after infiltration of the peritoneum with local anesthetic under direct visualization a 19 mm trocar was placed above into the right of the umbilicus off the midline away from the rectus diastases.  Within a fairly short order the mid abdominal 5 mm trocar had to be changed out to an extra long 5 mm trocar once again under direct visualization.    Through a stab incision in the epigastrium lower than usual to compensate for her hepatomegaly a Noreen retractor was used to elevate portions of the left lobe of the liver.   Beginning approximately two thirds of the way around the greater curvature the stomach, the gastrocolic vessels were divided with the Enseal device.  This proceeded proximally taking down all the short gastric vessels and exposing the left ramos.  It was clear that the cardia was herniating into the mediastinum and photodocumentation obtained.    The hernia sac was incised along the base of the left ramos and extended up and across the phrenoesophageal membrane.  The pars flaccida was divided, there was not a replaced hepatic vessel.  The hernia sac was incised along the base of the  right ramos and also extended up and across the phrenoesophageal membrane.  The hernia sac and its contents were dissected out of the mediastinum and reduced below the level of the crura.  There was not a paraesophageal component.  A latex free drain was used temporarily for esophageal retraction.  The GE junction was lengthened to well below the level of the crura by dissecting loose areolar tissue well into the mediastinum.  The crura were dissected to their meeting point inferiorly.  The anterior and posterior vagus nerves were preserved.  The hiatal hernia repair was performed posteriorly using a running nonabsorbable 2-0  V-Loc suture with good result, photodocumentation obtained before and after repair.      1 mg glucagon IV given.  Gastrocolic vessels were then divided medially to a few cm proximal to the pylorus.  Adhesions of the posterior stomach to the pancreas and retroperitoneum were divided.  The stomach was marked with a Kitner saturated with a marking pen 1 cm lateral to the angle of His, 3 cm lateral to the angularis, and 6 cm from the pylorus.  A 38 Japanese balloon bougie was advanced into the distal antrum and the balloon insufflated with 15 cc of saline.   The Titan stapler was positioned along the markings with the calibration balloon at the marking at the angularis and the stapler was closed.  The calibration bougie was desufflated and removed.  The 85% subtotal vertical sleeve gastrectomy was then performed with a single firing using the Titan stapler ([facility]).   The Titan stapler was removed.  The subtotal gastrectomy specimen was retrieved through the 19 mm trocar site incision, inspected, and sent unopened to pathology for permanent section.  It was a larger and thicker than average specimen.  Some excessive oozing noted along the staple line that appeared to respond to judicious cautery, 1 g TXA IV given.      Attention was directed toward cholecystectomy.  The Noreen was removed and the  left lateral 5 mm trocar moved up to this incision.   The gallbladder was elevated over the liver where omental adhesions were noted along its length and the duodenum was adhesed to the neck of the gallbladder.  The adhesions and duodenum were taking down taking care to avoid a duodenotomy.  The neck of the gallbladder was retracted laterally.  Blunt dissection was used to circumferentially dissect the neck of the gallbladder and the cystic duct and artery. The cystic duct was of small caliber and had no palpable stones.  Dissection in the triangle of Calot revealed the csytic artery entering onto the surface of the gallbladder.  The cystic duct and artery were divided individually between two 5 mm clips on the patient side, one on the gallbladder side.  Peritoneal attachments of the gallbladder to the liver bed were divided.  A cholecystotomy was not made.  The gallbladder was retrieved through the 19 mm trocar site incision, I palpated the gallbladder, it was distended with fluid, no masses or stones appreciated, and it was sent unopened to pathology for permanent section.  Leslie kym maneuvers given and hemostasis of the gallbladder bed fossa was excellent.      The epigastric trocar was returned to the left lateral abdominal position and the Noreen retractor replaced.  There was noted to be clot along the staple line and when suctioned free some areas of persistent oozing. This included the majority of the staple line beginning a few centimeters below the proximal staple line.  Therefore the staple line was oversewn using running absorbable 2-0 V-loc suture with good hemostasis.  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  The sleeve was submerged under saline.  Upper endoscopy was performed, and the endoscope was advanced into the duodenal bulb.  No air bubbles or leak seen, no  bleeding at the staple line, no narrowing at the angularis, no pyloric spasm or deformity, no gastritis, no hiatal hernia or Amaro's esophagus, Z-line approximately 40 cm, and the endoscope was withdrawn.  Endoscopic photodocumentation obtained of the GE junction and widely patent pylorus.  Irrigation fluid was suctioned free.  The sleeve was resting nicely and remained hemostatic.  The sleeve staple line was treated with 10 cc of aerosolized Tisseel fibrin glue.  Photodocumentation of the sleeve obtained before and after endoscopy.  The Noreen retractor was removed.  10 mg Barhemsys IV given.  Fascia at the 19 mm trocar site incision was closed with a horizontal mattress 0 Vicryl suture placed with a suture passer under direct visualization and tying the knot extracorporeally.  Remaining trocars were removed under direct visualization, no bleeding noted from their sites.  Skin in each incision was closed using 3-0 Monocryl plus in an interrupted subcuticular stitch followed by skin glue.  The patient tolerated the procedure well without complication, was taken to the recovery room in stable condition.

## 2022-10-13 NOTE — PLAN OF CARE
Goal Outcome Evaluation:  Plan of Care Reviewed With: patient            Received from surgery after a gastric sleeve. VSS, patient ambulating. SR. Will continue current plan of care

## 2022-10-13 NOTE — ANESTHESIA PREPROCEDURE EVALUATION
Anesthesia Evaluation     Patient summary reviewed and Nursing notes reviewed                Airway   Mallampati: II  Dental      Pulmonary - negative pulmonary ROS   Cardiovascular     (+) hypertension,       Neuro/Psych- negative ROS  GI/Hepatic/Renal/Endo    (+) morbid obesity,  thyroid problem     Musculoskeletal (-) negative ROS    Abdominal    Substance History - negative use     OB/GYN negative ob/gyn ROS         Other                        Anesthesia Plan    ASA 3     general with block     intravenous induction     Anesthetic plan, risks, benefits, and alternatives have been provided, discussed and informed consent has been obtained with: patient.    Use of blood products discussed with patient .       CODE STATUS:

## 2022-10-13 NOTE — ANESTHESIA PROCEDURE NOTES
"Peripheral Block      Patient reassessed immediately prior to procedure    Patient location during procedure: OR  Reason for block: at surgeon's request and post-op pain management  Preanesthetic Checklist  Completed: patient identified, IV checked, site marked, risks and benefits discussed, surgical consent, monitors and equipment checked, pre-op evaluation and timeout performed  Prep:  Pt Position: supine  Sterile barriers:cap, gloves, mask and washed/disinfected hands  Prep: ChloraPrep  Patient monitoring: blood pressure monitoring, continuous pulse oximetry and EKG  Procedure    Sedation: yes  Performed under: general  Guidance:ultrasound guided  Images:still images obtained, printed/placed on chart    Laterality:Bilateral  Block Type:TAP  Injection Technique:single-shot  Needle Type:short-bevel and echogenic  Needle Gauge:20 G  Resistance on Injection: none    Medications Used: dexamethasone sodium phosphate injection - Injection   2 mg - 10/13/2022 11:57:00 AM  bupivacaine PF (MARCAINE) 0.25 % injection - Injection   5 mL - 10/13/2022 11:57:00 AM      Medications  Comment:Block Injection:  LA dose divided between Right and Left block        Post Assessment  Injection Assessment: negative aspiration for heme, incremental injection and no paresthesia on injection  Patient Tolerance:comfortable throughout block  Complications:no  Additional Notes  Subcostal TAPs  A high-frequency linear transducer, with sterile cover, was placed sub-xiphoid to identify Line[place] Alba, right and left Rectus Abdominus Muscles (KHANG). The transducer was moved either right or left subcostally to identify the KHANG and the Transverse Abdominus Muscle (ORTEGA). The insertion site was prepped in sterile fashion and then localized with 2-5 ml of 1% Lidocaine. Using ultrasound-guidance, a 20-gauge B-Torres 4\" Ultraplex 360 non-stimulating echogenic needle was advanced in plane, from medial to lateral, until the tip of the needle was in the fascial " plane between the KHANG and ORTEGA. 1-3ml of preservative free normal saline was used to hydro-dissect the fascial planes. After the fascial plane was verified, the local anesthetic (LA) was injected. The procedure was repeated on the opposite side for bilateral coverage. Aspiration every 5 ml to prevent intravascular injection. Injection was completed with negative aspiration of blood and negative intravascular injection. Injection pressures were normal with minimal resistance. The subcostal approach to the TAP nerve block ideally anesthetizes the intercostal nerves T6-T9.

## 2022-10-13 NOTE — ANESTHESIA PROCEDURE NOTES
Airway  Urgency: elective    Airway not difficult    General Information and Staff    Patient location during procedure: OR  CRNA/CAA: Didi Caro CRNA  SRNA: Charisse Vasquez SRNA  Indications and Patient Condition  Indications for airway management: airway protection    Preoxygenated: yes  Mask difficulty assessment: 1 - vent by mask    Final Airway Details  Final airway type: endotracheal airway      Successful airway: ETT    Facilitating devices/methods: intubating stylet and cricoid pressure  Endotracheal tube insertion site: oral  Blade: Kristy  Blade size: 3  ETT size (mm): 7.0  Cormack-Lehane Classification: grade I - full view of glottis  Placement verified by: capnometry   Number of attempts at approach: 1  Assessment: lips, teeth, and gum same as pre-op and atraumatic intubation

## 2022-10-13 NOTE — ANESTHESIA POSTPROCEDURE EVALUATION
Patient: Dianna Caicedo    Procedure Summary     Date: 10/13/22 Room / Location:  [facility] /  [facility]    Anesthesia Start: 1146 Anesthesia Stop: 1433    Procedures:       GASTRIC SLEEVE LAPAROSCOPIC (Abdomen)      ESOPHAGOGASTRODUODENOSCOPY (Esophagus)      HIATAL HERNIA REPAIR LAPAROSCOPIC (Abdomen)      CHOLECYSTECTOMY LAPAROSCOPIC (Abdomen) Diagnosis:       Morbid obesity with body mass index of 45.0-49.9 in adult (HCC)      Chronic cholecystitis without calculus      Hiatal hernia with gastroesophageal reflux      (Morbid obesity with body mass index of 45.0-49.9 in adult (HCC) [E66.01, Z68.42])      (Chronic cholecystitis without calculus [K81.1])    Surgeons: Maurice Mittal MD Provider: Lester Salinas MD    Anesthesia Type: general with block ASA Status: 3          Anesthesia Type: general with block    Vitals  Vitals Value Taken Time   /96 10/13/22 1430   Temp 98 °F (36.7 °C) 10/13/22 1430   Pulse 75 10/13/22 1437   Resp 16 10/13/22 1430   SpO2 100 % 10/13/22 1437   Vitals shown include unvalidated device data.        Post Anesthesia Care and Evaluation    Patient location during evaluation: PACU  Patient participation: complete - patient participated  Level of consciousness: awake and alert  Pain management: adequate    Airway patency: patent  Anesthetic complications: No anesthetic complications  PONV Status: none  Cardiovascular status: hemodynamically stable and acceptable  Respiratory status: acceptable, face mask and nonlabored ventilation  Hydration status: acceptable

## 2022-10-14 ENCOUNTER — APPOINTMENT (OUTPATIENT)
Dept: GENERAL RADIOLOGY | Facility: HOSPITAL | Age: 37
End: 2022-10-14

## 2022-10-14 ENCOUNTER — READMISSION MANAGEMENT (OUTPATIENT)
Dept: CALL CENTER | Facility: HOSPITAL | Age: 37
End: 2022-10-14

## 2022-10-14 VITALS
BODY MASS INDEX: 47.21 KG/M2 | TEMPERATURE: 98.5 F | SYSTOLIC BLOOD PRESSURE: 161 MMHG | OXYGEN SATURATION: 90 % | RESPIRATION RATE: 16 BRPM | DIASTOLIC BLOOD PRESSURE: 88 MMHG | HEIGHT: 68 IN | HEART RATE: 105 BPM

## 2022-10-14 LAB
ALBUMIN SERPL-MCNC: 4.2 G/DL (ref 3.5–5.2)
ALBUMIN/GLOB SERPL: 1.5 G/DL
ALP SERPL-CCNC: 49 U/L (ref 39–117)
ALT SERPL W P-5'-P-CCNC: 60 U/L (ref 1–33)
ANION GAP SERPL CALCULATED.3IONS-SCNC: 13 MMOL/L (ref 5–15)
AST SERPL-CCNC: 48 U/L (ref 1–32)
BASOPHILS # BLD AUTO: 0.01 10*3/MM3 (ref 0–0.2)
BASOPHILS NFR BLD AUTO: 0.1 % (ref 0–1.5)
BILIRUB SERPL-MCNC: 0.5 MG/DL (ref 0–1.2)
BUN SERPL-MCNC: 9 MG/DL (ref 6–20)
BUN/CREAT SERPL: 10.3 (ref 7–25)
CALCIUM SPEC-SCNC: 9.2 MG/DL (ref 8.6–10.5)
CHLORIDE SERPL-SCNC: 101 MMOL/L (ref 98–107)
CO2 SERPL-SCNC: 24 MMOL/L (ref 22–29)
CREAT SERPL-MCNC: 0.87 MG/DL (ref 0.57–1)
DEPRECATED RDW RBC AUTO: 40.7 FL (ref 37–54)
EGFRCR SERPLBLD CKD-EPI 2021: 89.2 ML/MIN/1.73
EOSINOPHIL # BLD AUTO: 0 10*3/MM3 (ref 0–0.4)
EOSINOPHIL NFR BLD AUTO: 0 % (ref 0.3–6.2)
ERYTHROCYTE [DISTWIDTH] IN BLOOD BY AUTOMATED COUNT: 13.2 % (ref 12.3–15.4)
GLOBULIN UR ELPH-MCNC: 2.8 GM/DL
GLUCOSE SERPL-MCNC: 104 MG/DL (ref 65–99)
HCT VFR BLD AUTO: 40.3 % (ref 34–46.6)
HGB BLD-MCNC: 13.9 G/DL (ref 12–15.9)
IMM GRANULOCYTES # BLD AUTO: 0.06 10*3/MM3 (ref 0–0.05)
IMM GRANULOCYTES NFR BLD AUTO: 0.4 % (ref 0–0.5)
IRON 24H UR-MRATE: 46 MCG/DL (ref 37–145)
LYMPHOCYTES # BLD AUTO: 1.8 10*3/MM3 (ref 0.7–3.1)
LYMPHOCYTES NFR BLD AUTO: 12.6 % (ref 19.6–45.3)
MCH RBC QN AUTO: 29.3 PG (ref 26.6–33)
MCHC RBC AUTO-ENTMCNC: 34.5 G/DL (ref 31.5–35.7)
MCV RBC AUTO: 85 FL (ref 79–97)
MONOCYTES # BLD AUTO: 0.75 10*3/MM3 (ref 0.1–0.9)
MONOCYTES NFR BLD AUTO: 5.3 % (ref 5–12)
NEUTROPHILS NFR BLD AUTO: 11.64 10*3/MM3 (ref 1.7–7)
NEUTROPHILS NFR BLD AUTO: 81.6 % (ref 42.7–76)
NRBC BLD AUTO-RTO: 0 /100 WBC (ref 0–0.2)
PLATELET # BLD AUTO: 277 10*3/MM3 (ref 140–450)
PMV BLD AUTO: 8.8 FL (ref 6–12)
POTASSIUM SERPL-SCNC: 3.9 MMOL/L (ref 3.5–5.2)
PROT SERPL-MCNC: 7 G/DL (ref 6–8.5)
RBC # BLD AUTO: 4.74 10*6/MM3 (ref 3.77–5.28)
SODIUM SERPL-SCNC: 138 MMOL/L (ref 136–145)
WBC NRBC COR # BLD: 14.26 10*3/MM3 (ref 3.4–10.8)

## 2022-10-14 PROCEDURE — 83540 ASSAY OF IRON: CPT | Performed by: SURGERY

## 2022-10-14 PROCEDURE — 25010000002 THIAMINE PER 100 MG: Performed by: SURGERY

## 2022-10-14 PROCEDURE — 80053 COMPREHEN METABOLIC PANEL: CPT | Performed by: SURGERY

## 2022-10-14 PROCEDURE — 74240 X-RAY XM UPR GI TRC 1CNTRST: CPT

## 2022-10-14 PROCEDURE — 99024 POSTOP FOLLOW-UP VISIT: CPT | Performed by: SURGERY

## 2022-10-14 PROCEDURE — 25010000002 NA FERRIC GLUC CPLX PER 12.5 MG: Performed by: SURGERY

## 2022-10-14 PROCEDURE — 25010000002 ENOXAPARIN PER 10 MG: Performed by: SURGERY

## 2022-10-14 PROCEDURE — 85025 COMPLETE CBC W/AUTO DIFF WBC: CPT | Performed by: SURGERY

## 2022-10-14 PROCEDURE — 25010000002 CEFAZOLIN PER 500 MG: Performed by: SURGERY

## 2022-10-14 PROCEDURE — 25010000002 CYANOCOBALAMIN PER 1000 MCG: Performed by: SURGERY

## 2022-10-14 PROCEDURE — 0 DIATRIZOATE MEGLUMINE & SODIUM PER 1 ML: Performed by: SURGERY

## 2022-10-14 RX ORDER — ONDANSETRON 4 MG/1
4 TABLET, FILM COATED ORAL EVERY 4 HOURS PRN
Qty: 10 TABLET | Refills: 0 | Status: SHIPPED | OUTPATIENT
Start: 2022-10-14 | End: 2022-11-02

## 2022-10-14 RX ORDER — OMEPRAZOLE 40 MG/1
40 CAPSULE, DELAYED RELEASE ORAL DAILY
Qty: 60 CAPSULE | Refills: 0 | Status: SHIPPED | OUTPATIENT
Start: 2022-10-14 | End: 2022-12-07 | Stop reason: SDUPTHER

## 2022-10-14 RX ORDER — OXYCODONE HYDROCHLORIDE 5 MG/1
5 TABLET ORAL EVERY 4 HOURS PRN
Qty: 12 TABLET | Refills: 0 | Status: SHIPPED | OUTPATIENT
Start: 2022-10-14 | End: 2022-11-02

## 2022-10-14 RX ORDER — ENOXAPARIN SODIUM 100 MG/ML
40 INJECTION SUBCUTANEOUS EVERY 12 HOURS
Status: DISCONTINUED | OUTPATIENT
Start: 2022-10-15 | End: 2022-10-14 | Stop reason: HOSPADM

## 2022-10-14 RX ADMIN — SIMETHICONE 80 MG: 80 TABLET, CHEWABLE ORAL at 08:41

## 2022-10-14 RX ADMIN — GABAPENTIN 100 MG: 100 CAPSULE ORAL at 17:05

## 2022-10-14 RX ADMIN — LISINOPRIL 40 MG: 40 TABLET ORAL at 08:32

## 2022-10-14 RX ADMIN — SERTRALINE HYDROCHLORIDE 200 MG: 100 TABLET ORAL at 08:31

## 2022-10-14 RX ADMIN — ACETAMINOPHEN 1000 MG: 500 TABLET, FILM COATED ORAL at 14:12

## 2022-10-14 RX ADMIN — OXYCODONE 5 MG: 5 TABLET ORAL at 14:12

## 2022-10-14 RX ADMIN — ENOXAPARIN SODIUM 40 MG: 40 INJECTION SUBCUTANEOUS at 08:31

## 2022-10-14 RX ADMIN — OXYCODONE 5 MG: 5 TABLET ORAL at 02:49

## 2022-10-14 RX ADMIN — METOPROLOL SUCCINATE 25 MG: 25 TABLET, EXTENDED RELEASE ORAL at 08:32

## 2022-10-14 RX ADMIN — CEFAZOLIN 3 G: 10 INJECTION, POWDER, FOR SOLUTION INTRAVENOUS at 03:24

## 2022-10-14 RX ADMIN — PANTOPRAZOLE SODIUM 40 MG: 40 INJECTION, POWDER, FOR SOLUTION INTRAVENOUS at 05:40

## 2022-10-14 RX ADMIN — CYANOCOBALAMIN 1000 MCG: 1000 INJECTION, SOLUTION INTRAMUSCULAR; SUBCUTANEOUS at 08:32

## 2022-10-14 RX ADMIN — ACETAMINOPHEN 1000 MG: 500 TABLET, FILM COATED ORAL at 05:40

## 2022-10-14 RX ADMIN — BUSPIRONE HYDROCHLORIDE 10 MG: 10 TABLET ORAL at 08:32

## 2022-10-14 RX ADMIN — GABAPENTIN 100 MG: 100 CAPSULE ORAL at 08:32

## 2022-10-14 RX ADMIN — THIAMINE HYDROCHLORIDE 100 ML/HR: 100 INJECTION, SOLUTION INTRAMUSCULAR; INTRAVENOUS at 05:43

## 2022-10-14 RX ADMIN — SODIUM CHLORIDE 125 MG: 9 INJECTION, SOLUTION INTRAVENOUS at 11:32

## 2022-10-14 NOTE — PLAN OF CARE
Goal Outcome Evaluation:  Plan of Care Reviewed With: patient            VSS, patient ambulating and using IS to 1500 ml. Drinking protein in adequ amounts. Pain controlled with current regimen. Criteria met for discharge

## 2022-10-14 NOTE — PROGRESS NOTES
"Cc: POD#1 LSG/lisa/HHR  \"feel ok\"    Her mother is in the room.  The patient feels okay and would prefer to go home if possible.  She is tolerating her diet.  Nausea yesterday, none today.  Ambulating and voiding well.  Passing flatus, no bowel movement, no pulmonary complaints, spirometer 1500.  Her mother notes she does desaturate while sleeping without her CPAP.    No fever pulse 78 respirations 16 blood pressure 161/88 but as high as 186/125 saturation 90%.  She is no apparent distress.  Lungs clear to auscultation.  Abdomen soft, nontender/appropriate, nondistended, bowel sounds active.  Wounds look okay    CMP normal except glucose of 104 ALT 60 AST 48.  Iron is 46.  White count 14.3 with 82 segs 13 lymphs 5 monocytes no bands H&H 14 and 40.  Hemoglobin A1c normal at 4.90.  Upper GI images and report reviewed, unremarkable    Impression: Postop day #1 laparoscopic sleeve gastrectomy, cholecystectomy and hiatal hernia.  Doing well clinically.  She would like to go home and does meet discharge criteria.  Mild elevation in transaminases likely related to fatty liver and liver retraction during surgery and less likely complications of cholecystectomy.  Mild leukocytosis and left shift without evidence of infection.    Plan: Discharge home.  Discharge instructions discussed.  Instructed to take her diuretic as needed only.  She says she has her Eliquis at home.  See orders.  Call with any problems questions or concerns.  "

## 2022-10-14 NOTE — CASE MANAGEMENT/SOCIAL WORK
Discharge Planning Assessment  [facility]     Patient Name: Dianna Caicedo  MRN: [id number]  Today's Date: 10/14/2022    Admit Date: 10/13/2022    Plan:  eval   Discharge Needs Assessment     Row Name 10/14/22 1252       Living Environment    People in Home spouse    Current Living Arrangements home    Primary Care Provided by self    Provides Primary Care For no one    Family Caregiver if Needed spouse    Quality of Family Relationships helpful;involved    Able to Return to Prior Arrangements yes       Resource/Environmental Concerns    Resource/Environmental Concerns none       Transition Planning    Patient/Family Anticipates Transition to home with family    Patient/Family Anticipated Services at Transition none    Transportation Anticipated family or friend will provide       Discharge Needs Assessment    Readmission Within the Last 30 Days no previous admission in last 30 days    Equipment Currently Used at Home none    Concerns to be Addressed no discharge needs identified;denies needs/concerns at this time    Anticipated Changes Related to Illness none    Equipment Needed After Discharge none               Discharge Plan     Row Name 10/14/22 3627       Plan    Plan CM eval    Plan Comments Confirmed with patient that she lives with her spouse in [facility]. PCP is Charisse Meyer and she denies issues with her insurance. She is independent of ADLs and denies the use of DME at home. No dc needs identified at this time- CM will continue to follow for dc planning - [place] [phone]    Final Discharge Disposition Code 01 - home or self-care              Continued Care and Services - Admitted Since 10/13/2022    Coordination has not been started for this encounter.          Demographic Summary     Row Name 10/14/22 1251       General Information    Admission Type inpatient    Arrived From home    Referral Source admission list    Reason for Consult discharge planning    Preferred Language English        Contact Information    Permission Granted to Share Info With                Functional Status     Row Name 10/14/22 1254       Functional Status    Usual Activity Tolerance good    Current Activity Tolerance good       Functional Status, IADL    Medications independent    Meal Preparation independent    Housekeeping independent    Laundry independent    Shopping independent       Mental Status Summary    Recent Changes in Mental Status/Cognitive Functioning no changes               Psychosocial    No documentation.                Abuse/Neglect    No documentation.                Legal    No documentation.                Substance Abuse    No documentation.                Patient Forms    No documentation.                   Cydney Montanez RN

## 2022-10-14 NOTE — PLAN OF CARE
Problem: Adult Inpatient Plan of Care  Goal: Absence of Hospital-Acquired Illness or Injury  Intervention: Identify and Manage Fall Risk  Description: Perform standard risk assessment on admission using a validated tool or comprehensive approach appropriate to the patient; reassess fall risk frequently, with change in status or transfer to another level of care.  Communicate fall injury risk to interprofessional healthcare team.  Determine need for increased observation, equipment and environmental modification, such as low bed, signage and supportive, nonskid footwear.  Adjust safety measures to individual developmental age, stage and identified risk factors.  Reinforce the importance of safety and physical activity with patient and family.  Perform regular intentional rounding to assess need for position change, pain assessment and personal needs, including assistance with toileting.  Recent Flowsheet Documentation  Taken 10/14/2022 0000 by Jesu Alston, RN  Safety Promotion/Fall Prevention:   activity supervised   assistive device/personal items within reach   clutter free environment maintained   elopement precautions   fall prevention program maintained   nonskid shoes/slippers when out of bed   safety round/check completed  Taken 10/13/2022 2200 by Jesu Alston, RN  Safety Promotion/Fall Prevention:   activity supervised   assistive device/personal items within reach   clutter free environment maintained   elopement precautions   fall prevention program maintained   nonskid shoes/slippers when out of bed   safety round/check completed  Taken 10/13/2022 2000 by Jesu Alston, RN  Safety Promotion/Fall Prevention:   activity supervised   assistive device/personal items within reach   clutter free environment maintained   elopement precautions   fall prevention program maintained   nonskid shoes/slippers when out of bed   safety round/check completed  Intervention: Prevent Skin Injury  Description: Perform a  screening for skin injury risk, such as pressure or moisture associated skin damage on admission and at regular intervals throughout hospital stay.  Keep all areas of skin (especially folds) clean and dry.  Maintain adequate skin hydration.  Relieve and redistribute pressure and protect bony prominences; implement measures based on patient-specific risk factors.  Match turning and repositioning schedule to clinical condition.  Encourage weight shift frequently; assist with reposition if unable to complete independently.  Float heels off bed; avoid pressure on the Achilles tendon.  Keep skin free from extended contact with medical devices.  Encourage functional activity and mobility, as early as tolerated.  Use aids (e.g., slide boards, mechanical lift) during transfer.  Recent Flowsheet Documentation  Taken 10/14/2022 0000 by Jesu Alston RN  Body Position: position changed independently  Skin Protection:   adhesive use limited   incontinence pads utilized   protective footwear used   skin-to-skin areas padded   tubing/devices free from skin contact  Taken 10/13/2022 2200 by Jesu Alston, RN  Body Position: position changed independently  Taken 10/13/2022 2000 by Jesu Alston RN  Body Position: position changed independently  Skin Protection:   adhesive use limited   incontinence pads utilized   protective footwear used   skin-to-skin areas padded   tubing/devices free from skin contact  Intervention: Prevent and Manage VTE (Venous Thromboembolism) Risk  Description: Assess for VTE (venous thromboembolism) risk.  Encourage and assist with early ambulation.  Initiate and maintain compression or other therapy, as indicated, based on identified risk in accordance with organizational protocol and provider order.  Encourage both active and passive leg exercises while in bed, if unable to ambulate.  Recent Flowsheet Documentation  Taken 10/14/2022 0000 by Jesu Alston RN  Activity Management:   activity adjusted per  tolerance   dorsiflexion/plantar flexion performed   up ad charmaine  VTE Prevention/Management:   bilateral   sequential compression devices off   patient refused intervention  Range of Motion: active ROM (range of motion) encouraged  Taken 10/13/2022 2236 by Jesu Alston RN  Activity Management:   activity adjusted per tolerance   up ad charmaine  Taken 10/13/2022 2200 by Jesu Alston RN  Activity Management:   activity adjusted per tolerance   dorsiflexion/plantar flexion performed   up ad charmaine  Taken 10/13/2022 2146 by Jesu Alston RN  Activity Management:   activity adjusted per tolerance   up ad charmaine  Taken 10/13/2022 2000 by Jesu Alston RN  Activity Management:   activity adjusted per tolerance   dorsiflexion/plantar flexion performed   up ad charmaine  VTE Prevention/Management:   bilateral   sequential compression devices off   patient refused intervention  Range of Motion: ROM (range of motion) performed  Goal: Optimal Comfort and Wellbeing  Intervention: Provide Person-Centered Care  Description: Use a family-focused approach to care.  Develop trust and rapport by proactively providing information, encouraging questions, addressing concerns and offering reassurance.  Acknowledge emotional response to hospitalization.  Recognize and utilize personal coping strategies.  Honor spiritual and cultural preferences.  Recent Flowsheet Documentation  Taken 10/13/2022 2000 by Jesu Alston RN  Trust Relationship/Rapport:   choices provided   care explained   emotional support provided   empathic listening provided   questions answered   questions encouraged   reassurance provided   thoughts/feelings acknowledged     Problem: Fall Injury Risk  Goal: Absence of Fall and Fall-Related Injury  Outcome: Ongoing, Progressing  Intervention: Identify and Manage Contributors  Description: Develop a fall prevention plan with the patient and caregiver/family.  Provide reorientation, appropriate sensory stimulation and routines with changes in  mental status to decrease risk of fall.  Promote use of personal vision and auditory aids.  Assess assistance level required for safe and effective self-care; provide support as needed, such as toileting, mobilization. For age 65 and older, implement timed toileting with assistance.  Encourage physical activity, such as performance of mobility and self-care at highest level of patient ability, multicomponent exercise program and provision of appropriate assistive devices.  If fall occurs, assess the severity of injury; implement fall injury protocol. Determine the cause and revise fall injury prevention plan.  Regularly review medication contribution to fall risk; adjust medication administration times to minimize risk of falling.  Consider risk related to polypharmacy and age.  Balance adequate pain management with potential for oversedation.  Flowsheets  Taken 10/14/2022 0039  Self-Care Promotion: independence encouraged  Taken 10/13/2022 2000  Medication Review/Management:   medications reviewed   high-risk medications identified  Intervention: Promote Injury-Free Environment  Description: Provide a safe, barrier-free environment that encourages independent activity.  Keep care area uncluttered and well-lighted.  Determine need for increased observation or monitoring.  Avoid use of devices that minimize mobility, such as restraints or indwelling urinary catheter.  Flowsheets  Taken 10/14/2022 0000  Safety Promotion/Fall Prevention:   activity supervised   assistive device/personal items within reach   clutter free environment maintained   elopement precautions   fall prevention program maintained   nonskid shoes/slippers when out of bed   safety round/check completed  Taken 10/13/2022 2200  Safety Promotion/Fall Prevention:   activity supervised   assistive device/personal items within reach   clutter free environment maintained   elopement precautions   fall prevention program maintained   nonskid shoes/slippers  when out of bed   safety round/check completed  Taken 10/13/2022 2000  Safety Promotion/Fall Prevention:   activity supervised   assistive device/personal items within reach   clutter free environment maintained   elopement precautions   fall prevention program maintained   nonskid shoes/slippers when out of bed   safety round/check completed   Goal Outcome Evaluation:

## 2022-10-14 NOTE — ADDENDUM NOTE
Addendum  created 10/14/22 1246 by Didi Caro CRNA    Clinical Note Signed, Diagnosis association updated

## 2022-10-15 NOTE — OUTREACH NOTE
Prep Survey    Flowsheet Row Responses   [facility] patient discharged from? [place]   Is LACE score < 7 ? Yes   Emergency Room discharge w/ pulse ox? No   Eligibility [facility]   Date of Admission 10/13/22   Date of Discharge 10/14/22   Discharge Disposition Home or Self Care   Discharge diagnosis gastrectomy lap, egd, hiatal hernia repair lap, lap lisa   Does the patient have one of the following disease processes/diagnoses(primary or secondary)? General Surgery   Does the patient have Home health ordered? No   Is there a DME ordered? No   Prep survey completed? Yes          MORRIS SANTAMARIA - Registered Nurse

## 2022-10-17 ENCOUNTER — TRANSITIONAL CARE MANAGEMENT TELEPHONE ENCOUNTER (OUTPATIENT)
Dept: CALL CENTER | Facility: HOSPITAL | Age: 37
End: 2022-10-17

## 2022-10-17 NOTE — OUTREACH NOTE
Call Center TCM Note    Flowsheet Row Responses   [facility] patient discharged from? [place]   Does the patient have one of the following disease processes/diagnoses(primary or secondary)? General Surgery   TCM attempt successful? Yes   Call start time 1445   Call end time 1452   Discharge diagnosis gastrectomy lap, egd, hiatal hernia repair lap, lap lisa   Person spoke with today (if not patient) and relationship Patient   Meds reviewed with patient/caregiver? Yes   Is the patient having any side effects they believe may be caused by any medication additions or changes? No   Does the patient have all medications related to this admission filled (includes all antibiotics, pain medications, etc.) Yes   Is the patient taking all medications as directed (includes completed medication regime)? Yes   Comments Post-Op fu appt on 10/20/22 at 1:00 PM, and 11/14/22 at 8:30 AM   Psychosocial issues? No   Did the patient receive a copy of their discharge instructions? Yes   Nursing interventions Reviewed instructions with patient   What is the patient's perception of their health status since discharge? Improving   Nursing interventions Nurse provided patient education   Is the patient /caregiver able to teach back basic post-op care? Take showers only when approved by MD-sponge bathe until then, No tub bath, swimming, or hot tub until instructed by MD, Keep incision areas clean,dry and protected, Lifting as instructed by MD in discharge instructions   Is the patient/caregiver able to teach back signs and symptoms of incisional infection? Increased redness, swelling or pain at the incisonal site, Increased drainage or bleeding, Incisional warmth, Pus or odor from incision, Fever   Is the patient/caregiver able to teach back steps to recovery at home? Rest and rebuild strength, gradually increase activity, Eat a well-balance diet   If the patient is a current smoker, are they able to teach back resources for  cessation? Not a smoker   Is the patient/caregiver able to teach back the hierarchy of who to call/visit for symptoms/problems? PCP, Specialist, Home health nurse, Urgent Care, ED, 911 Yes   TCM call completed? Yes   Wrap up additional comments Pt states she is doing better, and having less pain today. Pt denies fever, or increased redness, swelling, drainage, warmth at incisional site. Pt is taking oxycodone for pain management. Pt verified PCP/Post-Op appts on 10/20/22.           Pina Cooley RN    10/17/2022, 14:53 EDT

## 2022-10-20 ENCOUNTER — OFFICE VISIT (OUTPATIENT)
Dept: BARIATRICS/WEIGHT MGMT | Facility: CLINIC | Age: 37
End: 2022-10-20

## 2022-10-20 ENCOUNTER — OFFICE VISIT (OUTPATIENT)
Dept: FAMILY MEDICINE CLINIC | Facility: CLINIC | Age: 37
End: 2022-10-20

## 2022-10-20 VITALS
HEIGHT: 68 IN | HEART RATE: 98 BPM | SYSTOLIC BLOOD PRESSURE: 134 MMHG | OXYGEN SATURATION: 99 % | TEMPERATURE: 97.2 F | BODY MASS INDEX: 44.41 KG/M2 | WEIGHT: 293 LBS | DIASTOLIC BLOOD PRESSURE: 64 MMHG

## 2022-10-20 VITALS
RESPIRATION RATE: 18 BRPM | WEIGHT: 293 LBS | HEART RATE: 80 BPM | DIASTOLIC BLOOD PRESSURE: 86 MMHG | OXYGEN SATURATION: 98 % | BODY MASS INDEX: 44.41 KG/M2 | HEIGHT: 68 IN | SYSTOLIC BLOOD PRESSURE: 134 MMHG | TEMPERATURE: 97.5 F

## 2022-10-20 DIAGNOSIS — I10 ESSENTIAL HYPERTENSION: Primary | ICD-10-CM

## 2022-10-20 DIAGNOSIS — E66.01 OBESITY, CLASS III, BMI 40-49.9 (MORBID OBESITY): Primary | ICD-10-CM

## 2022-10-20 PROCEDURE — 99024 POSTOP FOLLOW-UP VISIT: CPT | Performed by: SURGERY

## 2022-10-20 PROCEDURE — 99213 OFFICE O/P EST LOW 20 MIN: CPT | Performed by: PHYSICIAN ASSISTANT

## 2022-10-20 RX ORDER — AMOXICILLIN AND CLAVULANATE POTASSIUM 875; 125 MG/1; MG/1
1 TABLET, FILM COATED ORAL 2 TIMES DAILY
Qty: 14 TABLET | Refills: 0 | Status: SHIPPED | OUTPATIENT
Start: 2022-10-20 | End: 2022-10-27

## 2022-10-20 RX ORDER — LISINOPRIL 20 MG/1
20 TABLET ORAL DAILY
Qty: 90 TABLET | Refills: 1 | Status: SHIPPED | OUTPATIENT
Start: 2022-10-20

## 2022-10-20 NOTE — PROGRESS NOTES
Subjective   Dianna Caicedo is a 37 y.o. female  Follow-up (Follow up on surgery with Kyrie Valenzuela, Gastric sleeve, gallbladder removal and hernia )      History of Present Illness  The patient presents today for a follow up from recent surgery for gastric sleeve and to get her blood pressure checked with history of hypertension.     The patient notes feeling more tired. The patient confirms she had her surgery 10/13/2022. The patient notes she is having only liquids and averages 70g of protein. The patient notes she does not like protein powder. The patient notes she mainly uses express pods and mixes it with a protein shake. She notes her last bowel movement was and she denies constipation. The patient confirms she has a blood pressure cuff at home. The patient's blood pressure coming in today is 134/64 mmHg. The patient denies taking any stool softener. The patient notes sleeping all night last night but waking up feeling stiff. The patient notes she  no longer takes hydrochlorothiazide. The patient confirms she has had her gallbladder taken out, her gastric sleeve and a hernia repair. The patient denies any problems breathing today.  The patient notes she is off work until 11/8/2022.      The patient notes she has a cough.   The following portions of the patient's history were reviewed and updated as appropriate: allergies, current medications, past social history and problem list    Review of Systems   Constitutional: Negative for fatigue and unexpected weight change.   Respiratory: Negative for cough, chest tightness and shortness of breath.    Cardiovascular: Negative for chest pain, palpitations and leg swelling.   Gastrointestinal: Negative for nausea.   Skin: Negative for color change and rash.   Neurological: Negative for dizziness, syncope, weakness and headaches.       Objective     Vitals:    10/20/22 0953   BP: 134/64   Pulse: 98   Temp: 97.2 °F (36.2 °C)   SpO2: 99%       Physical  Exam  Constitutional:       General: She is not in acute distress.     Appearance: Normal appearance. She is well-developed. She is obese. She is not ill-appearing, toxic-appearing or diaphoretic.      Comments: BMI 45   HENT:      Head: Normocephalic and atraumatic.   Eyes:      Conjunctiva/sclera: Conjunctivae normal.   Pulmonary:      Effort: Pulmonary effort is normal. No respiratory distress.   Skin:     General: Skin is dry.      Coloration: Skin is not pale.      Findings: No erythema or rash.   Neurological:      Mental Status: She is alert and oriented to person, place, and time.      Coordination: Coordination normal.   Psychiatric:         Attention and Perception: She is attentive.         Mood and Affect: Mood normal.         Speech: Speech normal.         Behavior: Behavior normal.         Thought Content: Thought content normal.         Judgment: Judgment normal.         Assessment & Plan     Diagnoses and all orders for this visit:    1. Essential hypertension (Primary)    Other orders  -     lisinopril (PRINIVIL,ZESTRIL) 20 MG tablet; Take 1 tablet by mouth Daily. New dose for BP  Dispense: 90 tablet; Refill: 1     The patient's blood pressure is well controlled at this time. She will continue her current medication regimen of lisinopril 20 mg twice daily. She will continue to monitor her blood pressure at home. If her blood pressure is consistently less than 120 mmHg, she will decrease her lisinopril to 20 mg daily.     The patient will scheduled an appointment for her annual physical where she requests a PAP smear.     Transcribed from ambient dictation for Charisse Meyer PA-C by Elizabeth Joiner.  10/20/22   13:17 EDT    Patient or patient representative verbalized consent to the visit recording.  I have personally performed the services described in this document as transcribed by the above individual, and it is both accurate and complete.  Charisse Meyer PA-C  10/20/2022  13:49 EDT
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
## 2022-10-21 ENCOUNTER — OFFICE VISIT (OUTPATIENT)
Dept: BARIATRICS/WEIGHT MGMT | Facility: CLINIC | Age: 37
End: 2022-10-21

## 2022-10-21 ENCOUNTER — PATIENT MESSAGE (OUTPATIENT)
Dept: BARIATRICS/WEIGHT MGMT | Facility: CLINIC | Age: 37
End: 2022-10-21

## 2022-10-21 ENCOUNTER — TELEPHONE (OUTPATIENT)
Dept: BARIATRICS/WEIGHT MGMT | Facility: CLINIC | Age: 37
End: 2022-10-21

## 2022-10-21 VITALS
RESPIRATION RATE: 18 BRPM | WEIGHT: 293 LBS | DIASTOLIC BLOOD PRESSURE: 86 MMHG | HEIGHT: 68 IN | HEART RATE: 98 BPM | OXYGEN SATURATION: 99 % | BODY MASS INDEX: 44.41 KG/M2 | SYSTOLIC BLOOD PRESSURE: 122 MMHG | TEMPERATURE: 97.3 F

## 2022-10-21 DIAGNOSIS — S30.1XXS ABDOMINAL WALL SEROMA, SEQUELA: Primary | ICD-10-CM

## 2022-10-21 PROCEDURE — 99024 POSTOP FOLLOW-UP VISIT: CPT | Performed by: SURGERY
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
## 2022-10-21 NOTE — TELEPHONE ENCOUNTER
Pt called, stated that Dr. Jones opened up the edge of one of her incision sites yesterday, possible cellulitis, not able to express any drainage then, prescribed Augmentin.     Pt states that she has had to change the dressing/gauze several times as it is continuously soaking through with a orange tent to it, no odor, and the redness around the site has increased, denied any fever, chills, or other SOI. Please advise, thanks!

## 2022-10-28 ENCOUNTER — PATIENT MESSAGE (OUTPATIENT)
Dept: BARIATRICS/WEIGHT MGMT | Facility: CLINIC | Age: 37
End: 2022-10-28

## 2022-10-28 ENCOUNTER — OFFICE VISIT (OUTPATIENT)
Dept: BARIATRICS/WEIGHT MGMT | Facility: CLINIC | Age: 37
End: 2022-10-28

## 2022-10-28 VITALS
BODY MASS INDEX: 43.87 KG/M2 | OXYGEN SATURATION: 99 % | RESPIRATION RATE: 18 BRPM | HEIGHT: 68 IN | SYSTOLIC BLOOD PRESSURE: 122 MMHG | HEART RATE: 99 BPM | TEMPERATURE: 97.7 F | DIASTOLIC BLOOD PRESSURE: 74 MMHG | WEIGHT: 289.5 LBS

## 2022-10-28 DIAGNOSIS — S30.1XXS ABDOMINAL WALL SEROMA, SEQUELA: Primary | ICD-10-CM

## 2022-10-28 PROCEDURE — 99024 POSTOP FOLLOW-UP VISIT: CPT | Performed by: SURGERY

## 2022-10-28 RX ORDER — FLUCONAZOLE 150 MG/1
150 TABLET ORAL ONCE
Qty: 1 TABLET | Refills: 0 | Status: SHIPPED | OUTPATIENT
Start: 2022-10-28 | End: 2022-10-28

## 2022-10-28 RX ORDER — SODIUM HYPOCHLORITE 2.5 MG/ML
SOLUTION TOPICAL ONCE
Qty: 1000 ML | Refills: 2 | Status: SHIPPED | OUTPATIENT
Start: 2022-10-28 | End: 2022-10-28
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
## 2022-11-02 ENCOUNTER — OFFICE VISIT (OUTPATIENT)
Dept: BARIATRICS/WEIGHT MGMT | Facility: CLINIC | Age: 37
End: 2022-11-02

## 2022-11-02 VITALS
HEIGHT: 68 IN | RESPIRATION RATE: 16 BRPM | OXYGEN SATURATION: 98 % | WEIGHT: 285.5 LBS | DIASTOLIC BLOOD PRESSURE: 82 MMHG | BODY MASS INDEX: 43.27 KG/M2 | TEMPERATURE: 97.4 F | SYSTOLIC BLOOD PRESSURE: 136 MMHG | HEART RATE: 93 BPM

## 2022-11-02 DIAGNOSIS — S30.1XXS ABDOMINAL WALL SEROMA, SEQUELA: Primary | ICD-10-CM

## 2022-11-02 PROCEDURE — 99024 POSTOP FOLLOW-UP VISIT: CPT | Performed by: PHYSICIAN ASSISTANT

## 2022-11-02 RX ORDER — SODIUM HYPOCHLORITE 2.5 MG/ML
SOLUTION TOPICAL
COMMUNITY
Start: 2022-10-28 | End: 2023-01-16

## 2022-11-02 RX ORDER — FLUCONAZOLE 150 MG/1
150 TABLET ORAL ONCE
COMMUNITY
Start: 2022-10-28 | End: 2022-12-07
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
## 2022-11-07 ENCOUNTER — HOSPITAL ENCOUNTER (OUTPATIENT)
Dept: CT IMAGING | Facility: HOSPITAL | Age: 37
Discharge: HOME | End: 2022-11-07
Admitting: PHYSICIAN ASSISTANT

## 2022-11-07 ENCOUNTER — OFFICE VISIT (OUTPATIENT)
Dept: BARIATRICS/WEIGHT MGMT | Facility: CLINIC | Age: 37
End: 2022-11-07

## 2022-11-07 VITALS
SYSTOLIC BLOOD PRESSURE: 118 MMHG | RESPIRATION RATE: 16 BRPM | TEMPERATURE: 97.3 F | BODY MASS INDEX: 42.51 KG/M2 | OXYGEN SATURATION: 98 % | HEART RATE: 99 BPM | DIASTOLIC BLOOD PRESSURE: 88 MMHG | WEIGHT: 280.5 LBS | HEIGHT: 68 IN

## 2022-11-07 DIAGNOSIS — S30.1XXS ABDOMINAL WALL SEROMA, SEQUELA: ICD-10-CM

## 2022-11-07 DIAGNOSIS — E66.01 OBESITY, CLASS III, BMI 40-49.9 (MORBID OBESITY): ICD-10-CM

## 2022-11-07 DIAGNOSIS — Z98.84 STATUS POST BARIATRIC SURGERY: Primary | ICD-10-CM

## 2022-11-07 PROCEDURE — 74150 CT ABDOMEN W/O CONTRAST: CPT

## 2022-11-07 PROCEDURE — 99024 POSTOP FOLLOW-UP VISIT: CPT | Performed by: PHYSICIAN ASSISTANT
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
## 2022-11-10 ENCOUNTER — APPOINTMENT (OUTPATIENT)
Dept: CT IMAGING | Facility: HOSPITAL | Age: 37
End: 2022-11-10

## 2022-11-14 ENCOUNTER — OFFICE VISIT (OUTPATIENT)
Dept: BARIATRICS/WEIGHT MGMT | Facility: CLINIC | Age: 37
End: 2022-11-14

## 2022-11-14 VITALS
SYSTOLIC BLOOD PRESSURE: 130 MMHG | TEMPERATURE: 97.3 F | WEIGHT: 276.5 LBS | BODY MASS INDEX: 42.04 KG/M2 | DIASTOLIC BLOOD PRESSURE: 82 MMHG | HEART RATE: 88 BPM

## 2022-11-14 DIAGNOSIS — E55.9 VITAMIN D DEFICIENCY: ICD-10-CM

## 2022-11-14 DIAGNOSIS — R10.13 DYSPEPSIA: ICD-10-CM

## 2022-11-14 DIAGNOSIS — R53.83 FATIGUE, UNSPECIFIED TYPE: ICD-10-CM

## 2022-11-14 DIAGNOSIS — S30.1XXS ABDOMINAL WALL SEROMA, SEQUELA: ICD-10-CM

## 2022-11-14 DIAGNOSIS — E66.01 OBESITY, CLASS III, BMI 40-49.9 (MORBID OBESITY): Primary | ICD-10-CM

## 2022-11-14 DIAGNOSIS — R79.0 ABNORMAL BLOOD LEVEL OF IRON: ICD-10-CM

## 2022-11-14 PROCEDURE — 99024 POSTOP FOLLOW-UP VISIT: CPT | Performed by: PHYSICIAN ASSISTANT

## 2022-11-17 LAB
25(OH)D3+25(OH)D2 SERPL-MCNC: 27.5 NG/ML (ref 30–100)
ALBUMIN SERPL-MCNC: 4.5 G/DL (ref 3.5–5.2)
ALBUMIN/GLOB SERPL: 1.7 G/DL
ALP SERPL-CCNC: 56 U/L (ref 39–117)
ALT SERPL-CCNC: 19 U/L (ref 1–33)
AST SERPL-CCNC: 16 U/L (ref 1–32)
BASOPHILS # BLD AUTO: 0.05 10*3/MM3 (ref 0–0.2)
BASOPHILS NFR BLD AUTO: 0.6 % (ref 0–1.5)
BILIRUB SERPL-MCNC: 0.7 MG/DL (ref 0–1.2)
BUN SERPL-MCNC: 14 MG/DL (ref 6–20)
BUN/CREAT SERPL: 17.7 (ref 7–25)
CALCIUM SERPL-MCNC: 9.7 MG/DL (ref 8.6–10.5)
CHLORIDE SERPL-SCNC: 104 MMOL/L (ref 98–107)
CO2 SERPL-SCNC: 26.2 MMOL/L (ref 22–29)
CREAT SERPL-MCNC: 0.79 MG/DL (ref 0.57–1)
EGFRCR SERPLBLD CKD-EPI 2021: 100.2 ML/MIN/1.73
EOSINOPHIL # BLD AUTO: 0 10*3/MM3 (ref 0–0.4)
EOSINOPHIL NFR BLD AUTO: 0 % (ref 0.3–6.2)
ERYTHROCYTE [DISTWIDTH] IN BLOOD BY AUTOMATED COUNT: 14.7 % (ref 12.3–15.4)
FERRITIN SERPL-MCNC: 173 NG/ML (ref 13–150)
FOLATE SERPL-MCNC: 6.45 NG/ML (ref 4.78–24.2)
GLOBULIN SER CALC-MCNC: 2.7 GM/DL
GLUCOSE SERPL-MCNC: 67 MG/DL (ref 65–99)
HCT VFR BLD AUTO: 46.9 % (ref 34–46.6)
HGB BLD-MCNC: 15.6 G/DL (ref 12–15.9)
IMM GRANULOCYTES # BLD AUTO: 0.02 10*3/MM3 (ref 0–0.05)
IMM GRANULOCYTES NFR BLD AUTO: 0.3 % (ref 0–0.5)
IRON SERPL-MCNC: 57 MCG/DL (ref 37–145)
LYMPHOCYTES # BLD AUTO: 1.83 10*3/MM3 (ref 0.7–3.1)
LYMPHOCYTES NFR BLD AUTO: 23.1 % (ref 19.6–45.3)
MCH RBC QN AUTO: 28.8 PG (ref 26.6–33)
MCHC RBC AUTO-ENTMCNC: 33.3 G/DL (ref 31.5–35.7)
MCV RBC AUTO: 86.7 FL (ref 79–97)
METHYLMALONATE SERPL-SCNC: 166 NMOL/L (ref 0–378)
MONOCYTES # BLD AUTO: 0.56 10*3/MM3 (ref 0.1–0.9)
MONOCYTES NFR BLD AUTO: 7.1 % (ref 5–12)
NEUTROPHILS # BLD AUTO: 5.45 10*3/MM3 (ref 1.7–7)
NEUTROPHILS NFR BLD AUTO: 68.9 % (ref 42.7–76)
NRBC BLD AUTO-RTO: 0 /100 WBC (ref 0–0.2)
PLATELET # BLD AUTO: 253 10*3/MM3 (ref 140–450)
POTASSIUM SERPL-SCNC: 4.6 MMOL/L (ref 3.5–5.2)
PREALB SERPL-MCNC: 19 MG/DL (ref 14–35)
PROT SERPL-MCNC: 7.2 G/DL (ref 6–8.5)
RBC # BLD AUTO: 5.41 10*6/MM3 (ref 3.77–5.28)
SODIUM SERPL-SCNC: 143 MMOL/L (ref 136–145)
VIT B1 BLD-SCNC: 170.4 NMOL/L (ref 66.5–200)
WBC # BLD AUTO: 7.91 10*3/MM3 (ref 3.4–10.8)

## 2022-12-05 RX ORDER — ERGOCALCIFEROL 1.25 MG/1
50000 CAPSULE ORAL
Qty: 12 CAPSULE | Refills: 0 | Status: SHIPPED | OUTPATIENT
Start: 2022-12-05 | End: 2023-02-21

## 2022-12-07 ENCOUNTER — OFFICE VISIT (OUTPATIENT)
Dept: FAMILY MEDICINE CLINIC | Facility: CLINIC | Age: 37
End: 2022-12-07

## 2022-12-07 VITALS
OXYGEN SATURATION: 99 % | SYSTOLIC BLOOD PRESSURE: 118 MMHG | TEMPERATURE: 97 F | HEIGHT: 68 IN | BODY MASS INDEX: 40.47 KG/M2 | DIASTOLIC BLOOD PRESSURE: 68 MMHG | HEART RATE: 66 BPM | WEIGHT: 267 LBS

## 2022-12-07 DIAGNOSIS — Z00.00 GENERAL MEDICAL EXAM: Primary | ICD-10-CM

## 2022-12-07 DIAGNOSIS — Z23 IMMUNIZATION DUE: ICD-10-CM

## 2022-12-07 DIAGNOSIS — G89.29 CHRONIC JOINT PAIN: ICD-10-CM

## 2022-12-07 DIAGNOSIS — M25.50 CHRONIC JOINT PAIN: ICD-10-CM

## 2022-12-07 DIAGNOSIS — F43.23 SITUATIONAL MIXED ANXIETY AND DEPRESSIVE DISORDER: ICD-10-CM

## 2022-12-07 PROCEDURE — 90471 IMMUNIZATION ADMIN: CPT | Performed by: PHYSICIAN ASSISTANT

## 2022-12-07 PROCEDURE — 99395 PREV VISIT EST AGE 18-39: CPT | Performed by: PHYSICIAN ASSISTANT

## 2022-12-07 PROCEDURE — 90686 IIV4 VACC NO PRSV 0.5 ML IM: CPT | Performed by: PHYSICIAN ASSISTANT

## 2022-12-07 RX ORDER — SERTRALINE HYDROCHLORIDE 100 MG/1
200 TABLET, FILM COATED ORAL DAILY
Qty: 180 TABLET | Refills: 3 | Status: SHIPPED | OUTPATIENT
Start: 2022-12-07 | End: 2023-02-28

## 2022-12-07 RX ORDER — OMEPRAZOLE 40 MG/1
40 CAPSULE, DELAYED RELEASE ORAL DAILY
Qty: 180 CAPSULE | Refills: 3 | Status: SHIPPED | OUTPATIENT
Start: 2022-12-07 | End: 2023-02-05

## 2022-12-07 RX ORDER — TIZANIDINE 2 MG/1
2 TABLET ORAL NIGHTLY PRN
Qty: 30 TABLET | Refills: 11 | Status: SHIPPED | OUTPATIENT
Start: 2022-12-07

## 2022-12-07 NOTE — PROGRESS NOTES
"Subjective   Dianna Caicedo is a 37 y.o. female  Annual Exam (Annual physical ), Anxiety (Refill on zoloft to mail order ), and Heartburn (Refill on omeprazole to mail order )      History of Present Illness  Patient presents today for a preventive medical visit.  Patient is here to determine screening labs and tests that are due and to determine immunization status as well.  Patient will be counseled regarding preventative medicine issues such as regular exercise and healthy diet as well.      Dianna Matthews, 1985, coming in for annual wellness visit/general physical examination, also for follow-up on generalized anxiety disorder, GERD. She did receive her Moderna COVID-19 vaccination. She lost a lot of weight starting at 310 pounds, losing 43 pounds so far. She admits heartburn has gotten better; it really depends on what she eats. The patient notes since she had a cholecystectomy that she really did not want to take a bunch of pills. She admits having some liquid vitamin D from Dr. Hatfield, she took prior.    The patient is receiving her influenza vaccine.     She admits that she is anxious, very on edge. She notes the kids \"piss her off\" with the little things but she knows that should not be. The patient admits she is edgy, and short-tempered. She admits to not sleeping well but thinks it is anxiety related.    She admits her neck, shoulders and her lower back get worse around her period. She had pain before but she thought it was due to weight gain. She denies diarrhea but admits to being a little constipated. She just takes over-the-counter medicine.    The following portions of the patient's history were reviewed and updated as appropriate: allergies, current medications, past social history and problem list    Review of Systems   Constitutional: Negative.  Negative for fatigue and unexpected weight change.   HENT: Negative.    Eyes: Negative.    Respiratory: Negative.    Cardiovascular: Negative.  "   Gastrointestinal: Negative.    Endocrine: Negative.    Genitourinary: Negative.    Musculoskeletal: Positive for back pain. Negative for arthralgias, gait problem and myalgias.   Skin: Negative.    Allergic/Immunologic: Negative.    Neurological: Negative.  Negative for dizziness, tremors, weakness, light-headedness, numbness and headaches.   Hematological: Negative.    Psychiatric/Behavioral: Positive for dysphoric mood and sleep disturbance. Negative for agitation, behavioral problems, confusion, decreased concentration and hallucinations. The patient is nervous/anxious. The patient is not hyperactive.    All other systems reviewed and are negative.      Objective     Vitals:    12/07/22 1356   BP: 118/68   Pulse: 66   Temp: 97 °F (36.1 °C)   SpO2: 99%       Physical Exam  Vitals and nursing note reviewed.   Constitutional:       General: She is not in acute distress.     Appearance: Normal appearance. She is well-developed. She is obese. She is not ill-appearing, toxic-appearing or diaphoretic.      Comments: BMI 40   HENT:      Head: Normocephalic and atraumatic.   Neck:      Thyroid: No thyroid mass or thyromegaly.   Cardiovascular:      Rate and Rhythm: Normal rate and regular rhythm.      Heart sounds: Normal heart sounds.   Pulmonary:      Effort: Pulmonary effort is normal. No respiratory distress.   Skin:     General: Skin is warm and dry.   Neurological:      Mental Status: She is alert and oriented to person, place, and time.   Psychiatric:         Attention and Perception: Attention and perception normal. She is attentive.         Mood and Affect: Mood is anxious. Mood is not depressed. Affect is not angry or inappropriate.         Speech: Speech normal.         Behavior: Behavior normal.         Thought Content: Thought content normal.         Cognition and Memory: Cognition normal.         Judgment: Judgment normal.       Discussed preventative medicine issues with patient including regular  exercise, healthy diet, stress reduction, adequate sleep and recommended age-appropriate screening studies.  Assessment & Plan     Diagnoses and all orders for this visit:    1. General medical exam (Primary)    2. Situational mixed anxiety and depressive disorder  -     Ambulatory Referral to Behavioral Health    3. Immunization due  -     FluLaval/Fluzone >6 mos ([id number])    4. Chronic joint pain    Other orders  -     omeprazole (priLOSEC) 40 MG capsule; Take 1 capsule by mouth Daily for 60 days.  Dispense: 180 capsule; Refill: 3  -     tiZANidine (ZANAFLEX) 2 MG tablet; Take 1 tablet by mouth At Night As Needed for Muscle Spasms.  Dispense: 30 tablet; Refill: 11  -     sertraline (ZOLOFT) 100 MG tablet; Take 2 tablets by mouth Daily. 2 po qd  Dispense: 180 tablet; Refill: 3      Muscle relaxant sent to pharmacy. Continue Zoloft at same dose. Continue CPAP.  Drop to 20 mg on lisinopril,    Answers for HPI/ROS submitted by the patient on 12/7/2022  What is the primary reason for your visit?: Physical    Transcribed from ambient dictation for Charisse Meyer PA-C by Katie Alvares.  12/07/22   16:02 EST    Patient or patient representative verbalized consent to the visit recording.  I have personally performed the services described in this document as transcribed by the above individual, and it is both accurate and complete.  Charisse Meyer PA-C  12/8/2022  12:51 EST

## 2022-12-27 RX ORDER — AZITHROMYCIN 250 MG/1
TABLET, FILM COATED ORAL
Qty: 6 TABLET | Refills: 0 | Status: SHIPPED | OUTPATIENT
Start: 2022-12-27 | End: 2022-12-28 | Stop reason: SDUPTHER

## 2022-12-28 RX ORDER — AZITHROMYCIN 250 MG/1
TABLET, FILM COATED ORAL
Qty: 6 TABLET | Refills: 0 | Status: SHIPPED | OUTPATIENT
Start: 2022-12-28 | End: 2023-01-16

## 2023-01-16 ENCOUNTER — OFFICE VISIT (OUTPATIENT)
Dept: BARIATRICS/WEIGHT MGMT | Facility: CLINIC | Age: 38
End: 2023-01-16
Payer: OTHER GOVERNMENT

## 2023-01-16 VITALS
DIASTOLIC BLOOD PRESSURE: 82 MMHG | WEIGHT: 250.2 LBS | RESPIRATION RATE: 16 BRPM | HEART RATE: 96 BPM | HEIGHT: 68 IN | TEMPERATURE: 97.3 F | OXYGEN SATURATION: 98 % | SYSTOLIC BLOOD PRESSURE: 130 MMHG | BODY MASS INDEX: 37.92 KG/M2

## 2023-01-16 DIAGNOSIS — E66.9 OBESITY, CLASS II, BMI 35-39.9: Primary | ICD-10-CM

## 2023-01-16 DIAGNOSIS — R53.83 FATIGUE, UNSPECIFIED TYPE: ICD-10-CM

## 2023-01-16 DIAGNOSIS — R79.0 ABNORMAL BLOOD LEVEL OF IRON: ICD-10-CM

## 2023-01-16 DIAGNOSIS — R60.0 LOWER EXTREMITY EDEMA: ICD-10-CM

## 2023-01-16 DIAGNOSIS — I10 ESSENTIAL HYPERTENSION: ICD-10-CM

## 2023-01-16 DIAGNOSIS — E55.9 VITAMIN D DEFICIENCY: ICD-10-CM

## 2023-01-16 DIAGNOSIS — R10.13 DYSPEPSIA: ICD-10-CM

## 2023-01-16 PROCEDURE — 99214 OFFICE O/P EST MOD 30 MIN: CPT | Performed by: PHYSICIAN ASSISTANT
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
## 2023-01-18 LAB
25(OH)D3+25(OH)D2 SERPL-MCNC: 38.6 NG/ML (ref 30–100)
ALBUMIN SERPL-MCNC: 4.7 G/DL (ref 3.5–5.2)
ALBUMIN/GLOB SERPL: 1.6 G/DL
ALP SERPL-CCNC: 61 U/L (ref 39–117)
ALT SERPL-CCNC: 13 U/L (ref 1–33)
AST SERPL-CCNC: 13 U/L (ref 1–32)
BASOPHILS # BLD AUTO: 0.03 10*3/MM3 (ref 0–0.2)
BASOPHILS NFR BLD AUTO: 0.3 % (ref 0–1.5)
BILIRUB SERPL-MCNC: 0.8 MG/DL (ref 0–1.2)
BUN SERPL-MCNC: 14 MG/DL (ref 6–20)
BUN/CREAT SERPL: 15.7 (ref 7–25)
CALCIUM SERPL-MCNC: 9.9 MG/DL (ref 8.6–10.5)
CHLORIDE SERPL-SCNC: 103 MMOL/L (ref 98–107)
CO2 SERPL-SCNC: 26.9 MMOL/L (ref 22–29)
CREAT SERPL-MCNC: 0.89 MG/DL (ref 0.57–1)
EGFRCR SERPLBLD CKD-EPI 2021: 86.8 ML/MIN/1.73
EOSINOPHIL # BLD AUTO: 0 10*3/MM3 (ref 0–0.4)
EOSINOPHIL NFR BLD AUTO: 0 % (ref 0.3–6.2)
ERYTHROCYTE [DISTWIDTH] IN BLOOD BY AUTOMATED COUNT: 13.2 % (ref 12.3–15.4)
FERRITIN SERPL-MCNC: 107 NG/ML (ref 13–150)
FOLATE SERPL-MCNC: 5.45 NG/ML (ref 4.78–24.2)
GLOBULIN SER CALC-MCNC: 2.9 GM/DL
GLUCOSE SERPL-MCNC: 75 MG/DL (ref 65–99)
HCT VFR BLD AUTO: 48.8 % (ref 34–46.6)
HGB BLD-MCNC: 16.3 G/DL (ref 12–15.9)
IMM GRANULOCYTES # BLD AUTO: 0.02 10*3/MM3 (ref 0–0.05)
IMM GRANULOCYTES NFR BLD AUTO: 0.2 % (ref 0–0.5)
IRON SERPL-MCNC: 60 MCG/DL (ref 37–145)
LYMPHOCYTES # BLD AUTO: 2.16 10*3/MM3 (ref 0.7–3.1)
LYMPHOCYTES NFR BLD AUTO: 23.3 % (ref 19.6–45.3)
MCH RBC QN AUTO: 28.7 PG (ref 26.6–33)
MCHC RBC AUTO-ENTMCNC: 33.4 G/DL (ref 31.5–35.7)
MCV RBC AUTO: 86.1 FL (ref 79–97)
METHYLMALONATE SERPL-SCNC: 162 NMOL/L (ref 0–378)
MONOCYTES # BLD AUTO: 0.52 10*3/MM3 (ref 0.1–0.9)
MONOCYTES NFR BLD AUTO: 5.6 % (ref 5–12)
NEUTROPHILS # BLD AUTO: 6.54 10*3/MM3 (ref 1.7–7)
NEUTROPHILS NFR BLD AUTO: 70.6 % (ref 42.7–76)
NRBC BLD AUTO-RTO: 0 /100 WBC (ref 0–0.2)
PLATELET # BLD AUTO: 258 10*3/MM3 (ref 140–450)
POTASSIUM SERPL-SCNC: 4.8 MMOL/L (ref 3.5–5.2)
PREALB SERPL-MCNC: 20 MG/DL (ref 14–35)
PROT SERPL-MCNC: 7.6 G/DL (ref 6–8.5)
RBC # BLD AUTO: 5.67 10*6/MM3 (ref 3.77–5.28)
SODIUM SERPL-SCNC: 143 MMOL/L (ref 136–145)
T3 SERPL-MCNC: 91 NG/DL (ref 71–180)
T4 FREE SERPL-MCNC: 1.14 NG/DL (ref 0.93–1.7)
TSH SERPL DL<=0.005 MIU/L-ACNC: 2.46 UIU/ML (ref 0.27–4.2)
VIT B1 BLD-SCNC: 173.3 NMOL/L (ref 66.5–200)
WBC # BLD AUTO: 9.27 10*3/MM3 (ref 3.4–10.8)

## 2023-01-30 ENCOUNTER — OFFICE VISIT (OUTPATIENT)
Dept: PSYCHIATRY | Facility: CLINIC | Age: 38
End: 2023-01-30
Payer: OTHER GOVERNMENT

## 2023-01-30 DIAGNOSIS — F41.9 ANXIETY: ICD-10-CM

## 2023-01-30 PROCEDURE — 90791 PSYCH DIAGNOSTIC EVALUATION: CPT | Performed by: COUNSELOR
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
## 2023-02-21 ENCOUNTER — OFFICE VISIT (OUTPATIENT)
Dept: PSYCHIATRY | Facility: CLINIC | Age: 38
End: 2023-02-21
Payer: OTHER GOVERNMENT

## 2023-02-21 DIAGNOSIS — F41.9 ANXIETY: Primary | ICD-10-CM

## 2023-02-21 PROCEDURE — 90837 PSYTX W PT 60 MINUTES: CPT | Performed by: COUNSELOR

## 2023-02-21 NOTE — PROGRESS NOTES
Date:2023   Patient Name: Dianna Caicedo  : 1985   MRN: [id number]   Time IN: 2:30  Time OUT: 3:30     Referring Provider: Charisse Meyer PA-C    PROGRESS NOTE    History of Present Illness:   Dianna Caicedo is a 37 y.o. female who is being seen today for follow up individual Psychotherapy session.     Chief Complaint:  Pt arrived at the [facility]. Pt expressed some struggle of feeling overwhelmed, restless, excessive worry, irritable. Pt expressed some traumatic events that she struggles with and participated identifying a healthy balance to assist her with lowering anxiety.     ICD-10-CM ICD-9-CM   1. Anxiety  F41.9 300.00        Clinical Maneuvering/Intervention:  CBT technique to assist with lowering anxiety    Assisted patient in processing above session content; acknowledged and normalized patient’s thoughts, feelings, and concerns to build appropriate rapport and a positive therapeutic relationship with open and honest communication.  Rationalized patient thought process regarding Anxiety.  Discussed triggers associated with patient's Anxiety.  Also discussed coping skills for patient to implement such as Self care Journal.    Allowed patient to freely discuss issues without interruption or judgment. Provided safe, confidential environment to facilitate the development of positive therapeutic relationship and encourage open, honest communication. Assisted patient in identifying risk factors which would indicate the need for higher level of care including thoughts to harm self or others and/or self-harming behavior and encouraged patient to contact this office, call 911, or present to the nearest emergency room should any of these events occur. Discussed crisis intervention services and means to access. Patient adamantly and convincingly denies current suicidal or homicidal ideation or perceptual disturbance.    Assessment Scores:   PHQ-9 Total Score:     CRISTOFER-7 Score:     PTSD Total Score: .PTSDTOTALSCORE    (Scales based on 0 - 10 with 10 being the worst)  Depression: 5 Anxiety: 7       Mental Status Exam:   Hygiene:   good  Cooperation:  Cooperative  Eye Contact:  Good  Psychomotor Behavior:  Appropriate  Affect:  Appropriate  Mood: anxious  Speech:  Normal  Thought Process:  Linear  Thought Content:  Mood congruent  Suicidal:  None  Homicidal:  None  Hallucinations:  None  Delusion:  None  Memory:  Intact  Orientation:  Person  Reliability:  good  Insight:  Good  Judgement:  Good  Impulse Control:  Good  Physical/Medical Issues:  No      Patient's Support Network Includes:      Functional Status: Moderate impairment     Progress toward goal: At goal    Prognosis: Good with Ongoing Treatment     Medications:     Current Outpatient Medications:   •  busPIRone (BUSPAR) 10 MG tablet, Take 1 tablet by mouth 2 (Two) Times a Day. For anxiety, Disp: 60 tablet, Rfl: 5  •  lisinopril (PRINIVIL,ZESTRIL) 20 MG tablet, Take 1 tablet by mouth Daily. New dose for BP, Disp: 90 tablet, Rfl: 1  •  metoprolol succinate XL (TOPROL-XL) 25 MG 24 hr tablet, Take 1 tablet by mouth Daily., Disp: 90 tablet, Rfl: 1  •  NON FORMULARY, Place 1 each on the skin as directed by provider Daily. PATCHES:  Multi Vitamin Iron Vit D Biotin, Disp: , Rfl:   •  sertraline (ZOLOFT) 100 MG tablet, Take 2 tablets by mouth Daily. 2 po qd, Disp: 180 tablet, Rfl: 3  •  tiZANidine (ZANAFLEX) 2 MG tablet, Take 1 tablet by mouth At Night As Needed for Muscle Spasms., Disp: 30 tablet, Rfl: 11  •  vitamin D (ERGOCALCIFEROL) 1.25 MG (85155 UT) capsule capsule, Take 1 capsule by mouth Every 7 (Seven) Days for 12 doses., Disp: 12 capsule, Rfl: 0    Visit Diagnosis/Orders Placed This Visit:    ICD-10-CM ICD-9-CM   1. Anxiety  F41.9 300.00        PLAN:  1. Safety: No acute safety concerns  2. Risk Assessment: Risk of self-harm acutely is low. Risk of self-harm chronically is also low, but could be further elevated in the event  of treatment noncompliance and/or AODA.    Crisis Plan:  Symptoms and/or behaviors to indicate a crisis: Excessive worry or fear    What calming techniques or other strategies will patient use to de-escalate and stay safe: slow down, breathe, visualize calming self, think it though, listen to music, change focus, take a walk    Who is one person patient can contact to assist with de-escalation?     Treatment Plan/Goals: Patient will continue supportive psychotherapy efforts and medication regimen as prescribed. Therapist will provide Cognitive Behavioral Therapy to assist patient in improving functioning and gaining coping skills, maintaining stability, and avoiding decompensation and the need for higher level of care. Plan for treatment was discussed during today's visit. Patient acknowledged and verbally consented to continue with current treatment plan and was educated on the importance of compliance with treatment and follow-up appointments.     Patient will contact this office, call 911 or present to the nearest emergency room should suicidal or homicidal ideations occur.     Follow Up:   Return in about 2 weeks (around 3/7/2023).      KENDALL Mckinney   [facility] [place]     This document has been electronically signed by KENDALL Mckinney   February 21, 2023 16:37 EST

## 2023-02-28 ENCOUNTER — TELEMEDICINE (OUTPATIENT)
Dept: PSYCHIATRY | Facility: CLINIC | Age: 38
End: 2023-02-28
Payer: OTHER GOVERNMENT

## 2023-02-28 DIAGNOSIS — F33.1 MODERATE EPISODE OF RECURRENT MAJOR DEPRESSIVE DISORDER: ICD-10-CM

## 2023-02-28 DIAGNOSIS — F41.1 GENERALIZED ANXIETY DISORDER: Primary | ICD-10-CM

## 2023-02-28 PROCEDURE — 90792 PSYCH DIAG EVAL W/MED SRVCS: CPT | Performed by: NURSE PRACTITIONER

## 2023-02-28 RX ORDER — FLUOXETINE HYDROCHLORIDE 40 MG/1
40 CAPSULE ORAL DAILY
Qty: 30 CAPSULE | Refills: 2 | Status: SHIPPED | OUTPATIENT
Start: 2023-02-28

## 2023-02-28 RX ORDER — OMEPRAZOLE 40 MG/1
40 CAPSULE, DELAYED RELEASE ORAL DAILY
COMMUNITY

## 2023-02-28 RX ORDER — FLUOXETINE HYDROCHLORIDE 20 MG/1
20 CAPSULE ORAL DAILY
Qty: 7 CAPSULE | Refills: 0 | OUTPATIENT
Start: 2023-02-28 | End: 2023-03-23

## 2023-03-24 ENCOUNTER — TELEPHONE (OUTPATIENT)
Dept: FAMILY MEDICINE CLINIC | Facility: CLINIC | Age: 38
End: 2023-03-24
Payer: OTHER GOVERNMENT

## 2023-03-24 NOTE — TELEPHONE ENCOUNTER
She would need an appointment with us before we can put in an order for urine, if she is on antibiotic for sinus infection that should cover the urinary tract as well however if she is still having problems that would make me think that she has something else going on possibly vaginal I would recommend an in person exam.

## 2023-03-24 NOTE — TELEPHONE ENCOUNTER
----- Message from Dianna Caicedo sent at 3/24/2023  7:14 AM EDT -----  Regarding: Follow Up Question  Contact: [phone]  I went to [facility] because I knew I couldn’t get in to you all. He’s treating me for a Sinus infection but I completely forgot to mention to him the very cloudy urine I’m experiencing. Can I have a test put in to make sure I don’t have an UTI, I leave next week (Fri) on vacation.

## 2023-03-28 ENCOUNTER — OFFICE VISIT (OUTPATIENT)
Dept: SLEEP MEDICINE | Facility: HOSPITAL | Age: 38
End: 2023-03-28
Payer: OTHER GOVERNMENT

## 2023-03-28 VITALS
HEIGHT: 68 IN | HEART RATE: 86 BPM | OXYGEN SATURATION: 99 % | BODY MASS INDEX: 36.04 KG/M2 | SYSTOLIC BLOOD PRESSURE: 132 MMHG | DIASTOLIC BLOOD PRESSURE: 79 MMHG

## 2023-03-28 DIAGNOSIS — Z99.89 OSA ON CPAP: Primary | ICD-10-CM

## 2023-03-28 DIAGNOSIS — G47.33 OSA ON CPAP: Primary | ICD-10-CM

## 2023-03-28 PROCEDURE — 99213 OFFICE O/P EST LOW 20 MIN: CPT | Performed by: NURSE PRACTITIONER

## 2023-03-28 RX ORDER — DOXYCYCLINE HYCLATE 100 MG/1
CAPSULE ORAL
COMMUNITY
Start: 2023-03-23

## 2023-03-28 RX ORDER — FLUCONAZOLE 150 MG/1
150 TABLET ORAL ONCE
COMMUNITY
Start: 2023-03-23

## 2023-03-28 NOTE — PROGRESS NOTES
Chief Complaint:   Chief Complaint   Patient presents with   • Follow-up       HPI:    Dianna Caicedo is a 38 y.o. female here for follow-up of sleep apnea.  Patient was last seen 3/28/2022.  Patient continues to do well with CPAP therapy.  Patient has had a gastric sleeve and has lost 70 pounds since that time.  She has not yet reached her goal weight so we will hold off on retesting at this time.  She does sleep 7 to 9 hours nightly feels rested upon awakening.  She goes to sleep quickly and is very rare that she will get up in the night.  Patient has an Almira score of 3/24.  Patient has no concerns or complaints and wishes to continue therapy.        Current medications are:   Current Outpatient Medications:   •  Chlorcyclizine-Pseudoephed (Stahist AD) 25-60 MG tablet, Take 1 tablet by mouth 2 (Two) Times a Day for 10 days., Disp: 20 tablet, Rfl: 0  •  doxycycline (MONODOX) 100 MG capsule, Take 1 capsule by mouth 2 (Two) Times a Day for 10 days., Disp: 20 capsule, Rfl: 0  •  FLUoxetine (PROzac) 40 MG capsule, Take 1 capsule by mouth Daily., Disp: 30 capsule, Rfl: 2  •  lisinopril (PRINIVIL,ZESTRIL) 20 MG tablet, Take 1 tablet by mouth Daily. New dose for BP, Disp: 90 tablet, Rfl: 1  •  methylPREDNISolone (MEDROL) 4 MG dose pack, Take as directed on package instructions., Disp: 21 tablet, Rfl: 0  •  omeprazole (priLOSEC) 40 MG capsule, Take 1 capsule by mouth Daily., Disp: , Rfl:   •  tiZANidine (ZANAFLEX) 2 MG tablet, Take 1 tablet by mouth At Night As Needed for Muscle Spasms., Disp: 30 tablet, Rfl: 11  •  doxycycline (VIBRAMYCIN) 100 MG capsule, Take 1 capsule by mouth 2 (Two) Times a Day for 10 days., Disp: , Rfl:   •  fluconazole (DIFLUCAN) 150 MG tablet, Take 1 tablet by mouth 1 (One) Time., Disp: , Rfl: .      The patient's relevant past medical, surgical, family and social history were reviewed and updated in Epic as appropriate.       Review of Systems   Respiratory: Positive for apnea.   "  Cardiovascular: Positive for leg swelling.   Gastrointestinal:        Heartburn   Musculoskeletal: Positive for arthralgias and myalgias.   Neurological: Positive for headaches.   Psychiatric/Behavioral: Positive for dysphoric mood and sleep disturbance. The patient is nervous/anxious.    All other systems reviewed and are negative.        Objective:    Physical Exam  Constitutional:       Appearance: Normal appearance.   HENT:      Head: Normocephalic and atraumatic.      Mouth/Throat:      Comments: Class 3 airway  Cardiovascular:      Rate and Rhythm: Normal rate and regular rhythm.   Pulmonary:      Effort: Pulmonary effort is normal.      Breath sounds: Normal breath sounds.   Skin:     General: Skin is warm and dry.   Neurological:      Mental Status: She is alert and oriented to person, place, and time.   Psychiatric:         Mood and Affect: Mood normal.         Behavior: Behavior normal.         Thought Content: Thought content normal.         Judgment: Judgment normal.     /79   Pulse 86   Ht 172.7 cm (67.99\")   LMP 03/13/2023   SpO2 99%   BMI 36.04 kg/m²       CPAP Report  90/90 days of use  Greater than 4-hour use 100%  90% pressure 11.0  AHI 4.1  Settings 6-20    The patient continues to use and benefit from CPAP therapy.    ASSESSMENT/PLAN    Diagnoses and all orders for this visit:    1. NGUYEN on CPAP (Primary)  -     PAP Therapy        1. Counseled patient regarding multimodal approach with healthy nutrition, healthy sleep, regular physical activity, social activities, counseling, and medications. Encouraged to practice lateral sleep position. Avoid alcohol and sedatives close to bedtime.  2. Refill supplies x1 year.  Return to clinic 1 year sooner symptoms warrant.      I have reviewed the results of my evaluation and impression and discussed my recommendations in detail with the patient.      Signed by  AVINASH Harris    March 28, 2023      CC: Charisse Meyer PA-C         No " ref. provider found

## 2023-03-31 ENCOUNTER — TELEMEDICINE (OUTPATIENT)
Dept: PSYCHIATRY | Facility: CLINIC | Age: 38
End: 2023-03-31
Payer: OTHER GOVERNMENT

## 2023-03-31 DIAGNOSIS — F41.1 GENERALIZED ANXIETY DISORDER: Primary | Chronic | ICD-10-CM

## 2023-03-31 DIAGNOSIS — F33.1 MODERATE EPISODE OF RECURRENT MAJOR DEPRESSIVE DISORDER: Chronic | ICD-10-CM

## 2023-03-31 PROCEDURE — 99214 OFFICE O/P EST MOD 30 MIN: CPT | Performed by: NURSE PRACTITIONER

## 2023-03-31 NOTE — PROGRESS NOTES
"This provider is located at [facility] ,[address], [address] in [place], [place],using a secure MyChart Video Visit through [employer]. Patient is being seen remotely via telehealth at their home address in [place], and stated they are in a secure environment for this session. The patient's condition being diagnosed/treated is appropriate for telemedicine. The provider identified herself as well as her credentials.   The patient, and/or patients guardian, consent to be seen remotely, and when consent is given they understand that the consent allows for patient identifiable information to be sent to a third party as needed.   They may refuse to be seen remotely at any time. The electronic data is encrypted and password protected, and the patient and/or guardian has been advised of the potential risks to privacy not withstanding such measures.    Chief Complaint  Anxiety and Depression      Subjective          Dianna Caicedo presents today via MyChart Video through [employer] for medication management of her anxiety and depression.    History of Present Illness: Patient presents today for follow-up appointment after last being seen for an initial evaluation on 02/28/2023.  At that appointment she was switched from Zoloft to Prozac.  Patient reports today and reports \"I am doing good\".  She says she and her family are leaving for [place] tomorrow for a cruise during spring break.  Patient says the switch to Prozac has gone well.  She had some bad news last week when her father was diagnosed with prostate cancer, but she says it looks like it was found early and has not spread anywhere else.  She was very thankful for that.  She says there was some difficulty managing the emotions surrounding that, but feels she did well.  Patient says she has noticed some improvement overall with Prozac when compared to Zoloft and says she does not \"feel as heavy in my shoulders\".  She has " been taking the 40 mg for almost 2 weeks now.  She says there has been no negative impact on her sleep or appetite and they are about the same.  Patient denies any SI/HI, A/V hallucinations.      The following portions of the patient's history were reviewed and updated as appropriate: allergies, current medications, past family history, past medical history, past social history, past surgical history and problem list.      Current Medications:   Current Outpatient Medications   Medication Sig Dispense Refill   • Chlorcyclizine-Pseudoephed (Stahist AD) 25-60 MG tablet Take 1 tablet by mouth 2 (Two) Times a Day for 10 days. 20 tablet 0   • doxycycline (MONODOX) 100 MG capsule Take 1 capsule by mouth 2 (Two) Times a Day for 10 days. 20 capsule 0   • doxycycline (VIBRAMYCIN) 100 MG capsule Take 1 capsule by mouth 2 (Two) Times a Day for 10 days.     • fluconazole (DIFLUCAN) 150 MG tablet Take 1 tablet by mouth 1 (One) Time.     • FLUoxetine (PROzac) 40 MG capsule Take 1 capsule by mouth Daily. 30 capsule 2   • lisinopril (PRINIVIL,ZESTRIL) 20 MG tablet Take 1 tablet by mouth Daily. New dose for BP 90 tablet 1   • methylPREDNISolone (MEDROL) 4 MG dose pack Take as directed on package instructions. 21 tablet 0   • omeprazole (priLOSEC) 40 MG capsule Take 1 capsule by mouth Daily.     • tiZANidine (ZANAFLEX) 2 MG tablet Take 1 tablet by mouth At Night As Needed for Muscle Spasms. 30 tablet 11     No current facility-administered medications for this visit.       Mental Status Exam:   Hygiene:   MyChart video  Cooperation:  Cooperative  Eye Contact:  Good  Psychomotor Behavior:  Appropriate  Affect:  Appropriate  Mood: euthymic  Speech:  Normal  Thought Process:  Goal directed  Thought Content:  Mood congruent  Suicidal:  None  Homicidal:  None  Hallucinations:  None  Delusion:  None  Memory:  Intact  Orientation:  Person, Place, Time and Situation  Reliability:  good  Insight:  Good  Judgement:  Good  Impulse Control:   Good  Physical/Medical Issues:  NGUYEN, HTN, GERD     Objective   Vital Signs:   There were no vitals taken for this visit.    Physical Exam  Neurological:      Mental Status: She is oriented to person, place, and time. Mental status is at baseline.   Psychiatric:         Behavior: Behavior is cooperative.        Result Review :     The following data was reviewed by: AVINASH Contreras on 03/31/2023:    Data reviewed: Previous note, medication history          Assessment and Plan    Diagnoses and all orders for this visit:    1. Generalized anxiety disorder (Primary)    2. Moderate episode of recurrent major depressive disorder (HCC)           Tobacco Cessation:  Patient has denied an present or past tobacco use. No tobacco cessation education necessary.       Impression/Plan:  -This is my first follow-up appointment with patient.  Patient presents today and reports she has been doing somewhat better overall since her last appointment.  She feels the switch from Zoloft to Prozac has gone well, and has been beneficial for her.  She is reporting some improvement already in her overall functioning and a decreased in her anxiety and depression symptoms.  Patient is looking forward to continuing the process.  -Maintain Prozac 40 mg daily.  -Patient's YON report reviewed and deemed appropriate.  Patient counseled on use of controlled substances.  -Reviewed available lab work.  -Schedule MyChart video follow-up appointment for 4 weeks or as needed.    MEDS ORDERED DURING VISIT:  No orders of the defined types were placed in this encounter.        Follow Up   Return in about 4 weeks (around 4/28/2023), or if symptoms worsen or fail to improve, for Next scheduled follow up, Video visit.  Patient was given instructions and counseling regarding her condition or for health maintenance advice. Please see specific information pulled into the AVS if appropriate.       TREATMENT PLAN/GOALS: Continue supportive psychotherapy  efforts and medications as indicated. Treatment and medication options discussed during today's visit. Patient acknowledged and verbally consented to continue with current treatment plan and was educated on the importance of compliance with treatment and follow-up appointments.    MEDICATION ISSUES:  Discussed medication options and treatment plan of prescribed medication as well as the risks, benefits, and side effects including potential falls, possible impaired driving and metabolic adversities among others. Patient is agreeable to call the office with any worsening of symptoms or onset of side effects. Patient is agreeable to call 911 or go to the nearest ER should he/she begin having SI/HI.          This document has been electronically signed by AVINASH Tolentino, PMHNP-BC  March 31, 2023 10:11 EDT      Part of this note may be an electronic transcription/translation of spoken language to printed text using the Dragon Dictation System.

## 2023-04-12 PROCEDURE — 87086 URINE CULTURE/COLONY COUNT: CPT | Performed by: FAMILY MEDICINE

## 2023-04-12 PROCEDURE — 87186 SC STD MICRODIL/AGAR DIL: CPT | Performed by: FAMILY MEDICINE

## 2023-04-12 PROCEDURE — 87077 CULTURE AEROBIC IDENTIFY: CPT | Performed by: FAMILY MEDICINE

## 2023-04-17 ENCOUNTER — OFFICE VISIT (OUTPATIENT)
Dept: BARIATRICS/WEIGHT MGMT | Facility: CLINIC | Age: 38
End: 2023-04-17
Payer: OTHER GOVERNMENT

## 2023-04-17 VITALS
TEMPERATURE: 97.1 F | DIASTOLIC BLOOD PRESSURE: 64 MMHG | RESPIRATION RATE: 16 BRPM | HEIGHT: 64 IN | HEART RATE: 64 BPM | WEIGHT: 234.5 LBS | BODY MASS INDEX: 40.04 KG/M2 | SYSTOLIC BLOOD PRESSURE: 128 MMHG | OXYGEN SATURATION: 99 %

## 2023-04-17 DIAGNOSIS — L30.4 INTERTRIGINOUS DERMATITIS ASSOCIATED WITH MOISTURE: ICD-10-CM

## 2023-04-17 DIAGNOSIS — E55.9 VITAMIN D DEFICIENCY: ICD-10-CM

## 2023-04-17 DIAGNOSIS — K21.9 GASTROESOPHAGEAL REFLUX DISEASE, UNSPECIFIED WHETHER ESOPHAGITIS PRESENT: ICD-10-CM

## 2023-04-17 DIAGNOSIS — R53.83 FATIGUE, UNSPECIFIED TYPE: ICD-10-CM

## 2023-04-17 DIAGNOSIS — R79.0 ABNORMAL BLOOD LEVEL OF IRON: ICD-10-CM

## 2023-04-17 DIAGNOSIS — E66.01 OBESITY, CLASS III, BMI 40-49.9 (MORBID OBESITY): Primary | ICD-10-CM

## 2023-04-17 DIAGNOSIS — I10 ESSENTIAL HYPERTENSION: ICD-10-CM

## 2023-04-17 RX ORDER — NYSTATIN 100000 [USP'U]/G
POWDER TOPICAL 3 TIMES DAILY
Qty: 60 G | Refills: 1 | Status: SHIPPED | OUTPATIENT
Start: 2023-04-17
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
## 2023-04-21 LAB
25(OH)D3+25(OH)D2 SERPL-MCNC: 54.8 NG/ML (ref 30–100)
ALBUMIN SERPL-MCNC: 4.4 G/DL (ref 3.8–4.8)
ALBUMIN/GLOB SERPL: 1.7 {RATIO} (ref 1.2–2.2)
ALP SERPL-CCNC: 59 IU/L (ref 44–121)
ALT SERPL-CCNC: 13 IU/L (ref 0–32)
AST SERPL-CCNC: 15 IU/L (ref 0–40)
BASOPHILS # BLD AUTO: 0 X10E3/UL (ref 0–0.2)
BASOPHILS NFR BLD AUTO: 0 %
BILIRUB SERPL-MCNC: 0.8 MG/DL (ref 0–1.2)
BUN SERPL-MCNC: 16 MG/DL (ref 6–20)
BUN/CREAT SERPL: 20 (ref 9–23)
CALCIUM SERPL-MCNC: 9.6 MG/DL (ref 8.7–10.2)
CHLORIDE SERPL-SCNC: 104 MMOL/L (ref 96–106)
CO2 SERPL-SCNC: 24 MMOL/L (ref 20–29)
CREAT SERPL-MCNC: 0.81 MG/DL (ref 0.57–1)
EGFRCR SERPLBLD CKD-EPI 2021: 96 ML/MIN/1.73
EOSINOPHIL # BLD AUTO: 0 X10E3/UL (ref 0–0.4)
EOSINOPHIL NFR BLD AUTO: 0 %
ERYTHROCYTE [DISTWIDTH] IN BLOOD BY AUTOMATED COUNT: 13.7 % (ref 11.7–15.4)
FERRITIN SERPL-MCNC: 90 NG/ML (ref 15–150)
FOLATE SERPL-MCNC: 2.7 NG/ML
GLOBULIN SER CALC-MCNC: 2.6 G/DL (ref 1.5–4.5)
GLUCOSE SERPL-MCNC: 77 MG/DL (ref 70–99)
HCT VFR BLD AUTO: 42.9 % (ref 34–46.6)
HGB BLD-MCNC: 14.6 G/DL (ref 11.1–15.9)
IMM GRANULOCYTES # BLD AUTO: 0 X10E3/UL (ref 0–0.1)
IMM GRANULOCYTES NFR BLD AUTO: 0 %
IRON SERPL-MCNC: 67 UG/DL (ref 27–159)
LYMPHOCYTES # BLD AUTO: 1.7 X10E3/UL (ref 0.7–3.1)
LYMPHOCYTES NFR BLD AUTO: 21 %
MCH RBC QN AUTO: 29.8 PG (ref 26.6–33)
MCHC RBC AUTO-ENTMCNC: 34 G/DL (ref 31.5–35.7)
MCV RBC AUTO: 88 FL (ref 79–97)
METHYLMALONATE SERPL-SCNC: 190 NMOL/L (ref 0–378)
MONOCYTES # BLD AUTO: 0.4 X10E3/UL (ref 0.1–0.9)
MONOCYTES NFR BLD AUTO: 5 %
NEUTROPHILS # BLD AUTO: 6 X10E3/UL (ref 1.4–7)
NEUTROPHILS NFR BLD AUTO: 74 %
PLATELET # BLD AUTO: 243 X10E3/UL (ref 150–450)
POTASSIUM SERPL-SCNC: 5.1 MMOL/L (ref 3.5–5.2)
PREALB SERPL-MCNC: 23 MG/DL (ref 14–35)
PROT SERPL-MCNC: 7 G/DL (ref 6–8.5)
RBC # BLD AUTO: 4.9 X10E6/UL (ref 3.77–5.28)
SODIUM SERPL-SCNC: 141 MMOL/L (ref 134–144)
VIT B1 BLD-SCNC: 155.6 NMOL/L (ref 66.5–200)
WBC # BLD AUTO: 8.1 X10E3/UL (ref 3.4–10.8)

## 2023-04-25 ENCOUNTER — OFFICE VISIT (OUTPATIENT)
Dept: PSYCHIATRY | Facility: CLINIC | Age: 38
End: 2023-04-25
Payer: OTHER GOVERNMENT

## 2023-04-25 DIAGNOSIS — F41.9 ANXIETY: Primary | ICD-10-CM
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
## 2023-04-28 ENCOUNTER — TELEMEDICINE (OUTPATIENT)
Dept: PSYCHIATRY | Facility: CLINIC | Age: 38
End: 2023-04-28
Payer: OTHER GOVERNMENT

## 2023-04-28 DIAGNOSIS — F33.1 MODERATE EPISODE OF RECURRENT MAJOR DEPRESSIVE DISORDER: Chronic | ICD-10-CM

## 2023-04-28 DIAGNOSIS — F41.1 GENERALIZED ANXIETY DISORDER: Primary | Chronic | ICD-10-CM

## 2023-04-28 PROCEDURE — 99214 OFFICE O/P EST MOD 30 MIN: CPT | Performed by: NURSE PRACTITIONER

## 2023-04-28 RX ORDER — FLUOXETINE HYDROCHLORIDE 40 MG/1
40 CAPSULE ORAL DAILY
Qty: 90 CAPSULE | Refills: 2 | Status: SHIPPED | OUTPATIENT
Start: 2023-04-28

## 2023-04-28 NOTE — PROGRESS NOTES
"This provider is located at [facility] ,[address], [address] in [place], [place],using a secure MyChart Video Visit through [employer]. Patient is being seen remotely via telehealth at their home address in [place], and stated they are in a secure environment for this session. The patient's condition being diagnosed/treated is appropriate for telemedicine. The provider identified herself as well as her credentials.   The patient, and/or patients guardian, consent to be seen remotely, and when consent is given they understand that the consent allows for patient identifiable information to be sent to a third party as needed.   They may refuse to be seen remotely at any time. The electronic data is encrypted and password protected, and the patient and/or guardian has been advised of the potential risks to privacy not withstanding such measures.    Chief Complaint  Anxiety and Depression      Subjective          Dianna Caicedo presents today via [employer] Video through [employer] for medication management of her anxiety and depression.    History of Present Illness: Patient presents today via Pro[employer]Met video for a follow-up appointment after last being seen on 03/31/2023.  Patient says \"I am doing good\".  He feels like it working very well for her and says \"I do not have as much weight on my shoulders every day\".  She says she feels she is able to function easier throughout the day, and says that she feels level and even.  Patient says there have been no big ups or big downs over the last several weeks and she is very happy about that.  She reports taking it on a daily basis and denies any significant adverse effects associated with it.  She says she has no issues or significant concerns today.  She is sleeping and eating well, and denies significant issues with either.  She did recently have a very bad UTI and kidney infection, but is finally feeling better from that.  Patient " denies any SI/HI, A/V hallucinations.      The following portions of the patient's history were reviewed and updated as appropriate: allergies, current medications, past family history, past medical history, past social history, past surgical history and problem list.      Current Medications:   Current Outpatient Medications   Medication Sig Dispense Refill   • FLUoxetine (PROzac) 40 MG capsule Take 1 capsule by mouth Daily. 90 capsule 2   • lisinopril (PRINIVIL,ZESTRIL) 20 MG tablet Take 1 tablet by mouth Daily. New dose for BP 90 tablet 1   • nystatin (MYCOSTATIN) 590635 UNIT/GM powder Apply  topically to the appropriate area as directed 3 (Three) Times a Day. 60 g 1   • omeprazole (priLOSEC) 40 MG capsule Take 1 capsule by mouth Daily.     • tiZANidine (ZANAFLEX) 2 MG tablet Take 1 tablet by mouth At Night As Needed for Muscle Spasms. 30 tablet 11     No current facility-administered medications for this visit.       Mental Status Exam:   Hygiene:   MyChart video  Cooperation:  Cooperative  Eye Contact:  Good  Psychomotor Behavior:  Appropriate  Affect:  Appropriate  Mood: euthymic  Speech:  Normal  Thought Process:  Goal directed  Thought Content:  Mood congruent  Suicidal:  None  Homicidal:  None  Hallucinations:  None  Delusion:  None  Memory:  Intact  Orientation:  Person, Place, Time and Situation  Reliability:  good  Insight:  Good  Judgement:  Good  Impulse Control:  Good  Physical/Medical Issues:  NGUYEN, HTN, GERD     Objective   Vital Signs:   There were no vitals taken for this visit.    Physical Exam  Neurological:      Mental Status: She is oriented to person, place, and time. Mental status is at baseline.   Psychiatric:         Behavior: Behavior is cooperative.        Result Review :     The following data was reviewed by: AVINASH Contreras on 04/28/2023:    Data reviewed: Previous note, medication history          Assessment and Plan    Diagnoses and all orders for this visit:    1. Generalized  anxiety disorder (Primary)  -     FLUoxetine (PROzac) 40 MG capsule; Take 1 capsule by mouth Daily.  Dispense: 90 capsule; Refill: 2    2. Moderate episode of recurrent major depressive disorder  -     FLUoxetine (PROzac) 40 MG capsule; Take 1 capsule by mouth Daily.  Dispense: 90 capsule; Refill: 2           Tobacco Cessation:  Patient has denied an present or past tobacco use. No tobacco cessation education necessary.       Impression/Plan:  -This is a follow-up appointment.  Patient presents today and reports she has been doing well overall since her last 1.  Take medication as prescribed, and denies any adverse effects associated with it.  Patient says today she is very happy with how well she has continued to do, and feels her medication is working very well for her.  -Maintain Prozac 40 mg daily.  -Patient's YON report reviewed and deemed appropriate.  Patient counseled on use of controlled substances.  -Reviewed available lab work.  -Schedule MyChart video follow-up appointment for 8 weeks or as needed.    MEDS ORDERED DURING VISIT:  New Medications Ordered This Visit   Medications   • FLUoxetine (PROzac) 40 MG capsule     Sig: Take 1 capsule by mouth Daily.     Dispense:  90 capsule     Refill:  2         Follow Up   Return in about 8 weeks (around 6/23/2023), or if symptoms worsen or fail to improve, for Next scheduled follow up, Video visit.  Patient was given instructions and counseling regarding her condition or for health maintenance advice. Please see specific information pulled into the AVS if appropriate.       TREATMENT PLAN/GOALS: Continue supportive psychotherapy efforts and medications as indicated. Treatment and medication options discussed during today's visit. Patient acknowledged and verbally consented to continue with current treatment plan and was educated on the importance of compliance with treatment and follow-up appointments.    MEDICATION ISSUES:  Discussed medication options and  treatment plan of prescribed medication as well as the risks, benefits, and side effects including potential falls, possible impaired driving and metabolic adversities among others. Patient is agreeable to call the office with any worsening of symptoms or onset of side effects. Patient is agreeable to call 911 or go to the nearest ER should he/she begin having SI/HI.          This document has been electronically signed by AVINASH Tolentino, PMHNP-BC  April 28, 2023 08:28 EDT      Part of this note may be an electronic transcription/translation of spoken language to printed text using the Dragon Dictation System.

## 2023-05-01 RX ORDER — FOLIC ACID 1 MG/1
1 TABLET ORAL DAILY
Qty: 90 TABLET | Refills: 0 | Status: SHIPPED | OUTPATIENT
Start: 2023-05-01

## 2023-05-09 ENCOUNTER — TELEMEDICINE (OUTPATIENT)
Dept: PSYCHIATRY | Facility: CLINIC | Age: 38
End: 2023-05-09
Payer: OTHER GOVERNMENT

## 2023-05-09 DIAGNOSIS — F41.9 ANXIETY: Primary | ICD-10-CM

## 2023-05-09 NOTE — PROGRESS NOTES
Telehealth Encounter Note:  Date of Service: May 9, 2023  Patient Name: Dianna Caicedo  : 1985   MRN: [id number]   Time In: 8:00  Time Out: 9:00    This provider is located at [facility] [address], [place] [address] using a secure [employer] Video Visit through [employer]. Patient is being seen remotely via telehealth at home address in [place] and stated they are in a secure environment for this session. The patient's condition being diagnosed/treated is appropriate for telemedicine. The provider identified herself as well as her credentials. The patient, and/or patients guardian, consent to be seen remotely, and when consent is given they understand that the consent allows for patient identifiable information to be sent to a third party as needed. They may refuse to be seen remotely at any time. The electronic data is encrypted and password protected, and the patient and/or guardian has been advised of the potential risks to privacy not withstanding such measures.     You have chosen to receive care through a telehealth visit.  Do you consent to use a video/audio connection for your medical care today? Yes    Referring Provider: Charisse Meyer PA-C    PROGRESS NOTE    History of Present Illness:   Dianna Caicedo is a 38 y.o. female who is being seen today for follow up individual Psychotherapy session.     Chief Complaint: Patient arrived to her telehealth appointment.  Patient expressed some struggles with excessive worry, restlessness, overwhelmed, irritability, and overthinking.    ICD-10-CM ICD-9-CM   1. Anxiety  F41.9 300.00           Clinical Maneuvering/Intervention: Psychoeducation and EMDR to assist with lowering anxiety    Assisted patient in processing above session content; acknowledged and normalized patient’s thoughts, feelings, and concerns by utilizing a person-centered approach in efforts to build appropriate rapport and a positive therapeutic relationship  with open and honest communication.  Rationalized patient thought process regarding anxiety.  Discussed triggers associated with patient's anxiety.  Also discussed coping skills for patient to implement such as mindful breathing.  Patient participated in phase 2 of being EMDR to assist with building resources, communication skills, and processing emotions.  Patient created a smart goal to continue practicing I statements and expressing her emotions to her family and researching ACT model    Allowed patient to freely discuss issues without interruption or judgment. Provided safe, confidential environment to facilitate the development of positive therapeutic relationship and encourage open, honest communication. Assisted patient in identifying risk factors which would indicate the need for higher level of care including thoughts to harm self or others and/or self-harming behavior and encouraged patient to contact this office, call 911, or present to the nearest emergency room should any of these events occur. Discussed crisis intervention services and means to access. Patient adamantly and convincingly denies current suicidal or homicidal ideation or perceptual disturbance.    Assessment Scores:   PHQ-9 Total Score: PHQ-9 Total Score:     CRISTOFER-7 Score:      Mental Status Exam:   Hygiene:   good  Cooperation:  Cooperative  Eye Contact:  Good  Psychomotor Behavior:  Restless  Affect:  Appropriate  Mood: anxious  Speech:  Normal  Thought Process:  Linear  Thought Content:  Mood congruent  Suicidal:  None  Homicidal:  None  Hallucinations:  None  Delusion:  None  Memory:  Intact  Orientation:  Person  Reliability:  good  Insight:  Good  Judgement:  Good  Impulse Control:  Good  Physical/Medical Issues:  No      Patient's Support Network Includes:      Functional Status: Moderate impairment     Progress toward goal: At goal    Prognosis: Good with Ongoing Treatment     Medications:     Current Outpatient Medications:    •  FLUoxetine (PROzac) 40 MG capsule, Take 1 capsule by mouth Daily., Disp: 90 capsule, Rfl: 2  •  folic acid (FOLVITE) 1 MG tablet, Take 1 tablet by mouth Daily., Disp: 90 tablet, Rfl: 0  •  lisinopril (PRINIVIL,ZESTRIL) 20 MG tablet, Take 1 tablet by mouth Daily. New dose for BP, Disp: 90 tablet, Rfl: 1  •  nystatin (MYCOSTATIN) 240659 UNIT/GM powder, Apply  topically to the appropriate area as directed 3 (Three) Times a Day., Disp: 60 g, Rfl: 1  •  omeprazole (priLOSEC) 40 MG capsule, Take 1 capsule by mouth Daily., Disp: , Rfl:   •  tiZANidine (ZANAFLEX) 2 MG tablet, Take 1 tablet by mouth At Night As Needed for Muscle Spasms., Disp: 30 tablet, Rfl: 11    Visit Diagnosis/Orders Placed This Visit:    ICD-10-CM ICD-9-CM   1. Anxiety  F41.9 300.00        PLAN:  1. Safety: No acute safety concerns  2. Risk Assessment: Risk of self-harm acutely is low. Risk of self-harm chronically is also low, but could be further elevated in the event of treatment noncompliance and/or AODA.    Crisis Plan:  Symptoms and/or behaviors to indicate a crisis: Excessive worry or fear and Feeling sad or low    What calming techniques or other strategies will patient use to de-escalate and stay safe: slow down, breathe, visualize calming self, think it though, listen to music, change focus, take a walk    Who is one person patient can contact to assist with de-escalation?     Treatment Plan/Goals: Patient will continue supportive psychotherapy efforts and medication regimen as prescribed. Therapist will provide Cognitive Behavioral Therapy to assist patient in improving functioning and gaining coping skills, maintaining stability, and avoiding decompensation and the need for higher level of care. Plan for treatment was discussed during today's visit. Patient acknowledged and verbally consented to continue with current treatment plan and was educated on the importance of compliance with treatment and follow-up appointments.      Patient will contact this office, call 911 or present to the nearest emergency room should suicidal or homicidal ideations occur.     Follow Up:   No follow-ups on file.      KENDALL Mckinney   [facility] [place]     This document has been electronically signed by KENDALL Mckinney   May 9, 2023 09:01 EDT

## 2023-05-23 ENCOUNTER — OFFICE VISIT (OUTPATIENT)
Dept: PSYCHIATRY | Facility: CLINIC | Age: 38
End: 2023-05-23
Payer: OTHER GOVERNMENT

## 2023-05-23 DIAGNOSIS — F41.9 ANXIETY: Primary | ICD-10-CM

## 2023-05-23 NOTE — PROGRESS NOTES
Date:2023   Patient Name: Dianna Caicedo  : 1985   MRN: [id number]   Time IN: 3:34    Time OUT: 4:28    Referring Provider: Charisse Meyer PA-C    PROGRESS NOTE    History of Present Illness:   Dianna Caicedo is a 38 y.o. female who is being seen today for follow up individual Psychotherapy session.     Chief Complaint:  Patient arrived at the [facility].  Patient expressed some excessive worry, over thinking, restlessness, anger, and overwhelmed.    ICD-10-CM ICD-9-CM   1. Anxiety  F41.9 300.00        Clinical Maneuvering/Intervention: CBT techniques to assist with her anxiety    Assisted patient in processing above session content; acknowledged and normalized patient’s thoughts, feelings, and concerns to build appropriate rapport and a positive therapeutic relationship with open and honest communication.  Rationalized patient thought process regarding anxiety.  Discussed triggers associated with patient's anxiety.  Also discussed coping skills for patient to implement such as mindful breathing.    Allowed patient to freely discuss issues without interruption or judgment. Provided safe, confidential environment to facilitate the development of positive therapeutic relationship and encourage open, honest communication. Assisted patient in identifying risk factors which would indicate the need for higher level of care including thoughts to harm self or others and/or self-harming behavior and encouraged patient to contact this office, call 911, or present to the nearest emergency room should any of these events occur. Discussed crisis intervention services and means to access. Patient adamantly and convincingly denies current suicidal or homicidal ideation or perceptual disturbance.    Assessment Scores:   PHQ-9 Total Score:     CRISTOFER-7 Score:    PTSD Total Score: .PTSDTOTALSCORE    (Scales based on 0 - 10 with 10 being the worst)  Depression: 0 Anxiety: 5       Mental Status Exam:    Hygiene:   good  Cooperation:  Cooperative  Eye Contact:  Good  Psychomotor Behavior:  Appropriate  Affect:  Appropriate  Mood: anxious  Speech:  Normal  Thought Process:  Linear  Thought Content:  Mood congruent  Suicidal:  None  Homicidal:  None  Hallucinations:  None  Delusion:  None  Memory:  Intact  Orientation:  Person  Reliability:  good  Insight:  Good  Judgement:  Good  Impulse Control:  Good  Physical/Medical Issues:  No      Patient's Support Network Includes:      Functional Status: Moderate impairment     Progress toward goal: At goal    Prognosis: Good with Ongoing Treatment     Medications:     Current Outpatient Medications:   •  FLUoxetine (PROzac) 40 MG capsule, Take 1 capsule by mouth Daily., Disp: 90 capsule, Rfl: 2  •  folic acid (FOLVITE) 1 MG tablet, Take 1 tablet by mouth Daily., Disp: 90 tablet, Rfl: 0  •  lisinopril (PRINIVIL,ZESTRIL) 20 MG tablet, Take 1 tablet by mouth Daily. New dose for BP, Disp: 90 tablet, Rfl: 1  •  nystatin (MYCOSTATIN) 677191 UNIT/GM powder, Apply  topically to the appropriate area as directed 3 (Three) Times a Day., Disp: 60 g, Rfl: 1  •  omeprazole (priLOSEC) 40 MG capsule, Take 1 capsule by mouth Daily., Disp: , Rfl:   •  tiZANidine (ZANAFLEX) 2 MG tablet, Take 1 tablet by mouth At Night As Needed for Muscle Spasms., Disp: 30 tablet, Rfl: 11    Visit Diagnosis/Orders Placed This Visit:    ICD-10-CM ICD-9-CM   1. Anxiety  F41.9 300.00        PLAN:  1. Safety: No acute safety concerns  2. Risk Assessment: Risk of self-harm acutely is low. Risk of self-harm chronically is also low, but could be further elevated in the event of treatment noncompliance and/or AODA.    Crisis Plan:  Symptoms and/or behaviors to indicate a crisis: Excessive worry or fear    What calming techniques or other strategies will patient use to de-escalate and stay safe: slow down, breathe, visualize calming self, think it though, listen to music, change focus, take a walk    Who is  one person patient can contact to assist with de-escalation? Significant other    Treatment Plan/Goals: Patient will continue supportive psychotherapy efforts and medication regimen as prescribed. Therapist will provide Cognitive Behavioral Therapy to assist patient in improving functioning and gaining coping skills, maintaining stability, and avoiding decompensation and the need for higher level of care. Plan for treatment was discussed during today's visit. Patient acknowledged and verbally consented to continue with current treatment plan and was educated on the importance of compliance with treatment and follow-up appointments.     Patient will contact this office, call 911 or present to the nearest emergency room should suicidal or homicidal ideations occur.     Follow Up:   No follow-ups on file.      KENDALL Mckinney   [facility] [place]     This document has been electronically signed by KENDALL Mckinney   May 23, 2023 16:29 EDT

## 2023-08-18 ENCOUNTER — OFFICE VISIT (OUTPATIENT)
Dept: FAMILY MEDICINE CLINIC | Facility: CLINIC | Age: 38
End: 2023-08-18
Payer: OTHER GOVERNMENT

## 2023-08-18 VITALS
HEIGHT: 68 IN | DIASTOLIC BLOOD PRESSURE: 78 MMHG | SYSTOLIC BLOOD PRESSURE: 124 MMHG | BODY MASS INDEX: 36.71 KG/M2 | OXYGEN SATURATION: 99 % | TEMPERATURE: 97.7 F | WEIGHT: 242.2 LBS | HEART RATE: 63 BPM

## 2023-08-18 DIAGNOSIS — M77.8 TENDONITIS OF RIGHT HAND: ICD-10-CM

## 2023-08-18 DIAGNOSIS — N94.6 DYSMENORRHEA: ICD-10-CM

## 2023-08-18 DIAGNOSIS — M79.671 FOOT PAIN, RIGHT: Primary | ICD-10-CM

## 2023-08-18 PROCEDURE — 99213 OFFICE O/P EST LOW 20 MIN: CPT | Performed by: PHYSICIAN ASSISTANT

## 2023-08-18 RX ORDER — PREDNISONE 20 MG/1
20 TABLET ORAL DAILY
Qty: 3 TABLET | Refills: 0 | Status: SHIPPED | OUTPATIENT
Start: 2023-08-18

## 2023-08-18 NOTE — PROGRESS NOTES
"Subjective   Dianna Caicedo is a 38 y.o. female  Hand Pain (Right hand pain since Sunday ) and Foot Pain (Increased right heel pain, concerned about plantar fascitis )      History of Present Illness  The patient is Dianna Caicedo, whose YOB: 1985. She presents today to discuss right foot and right hand pain.    The patient's pain started in her right foot. She went to [place] and walked a lot. She has slowly been trying to increase her exercise, but then it is just in her right heel. A chiropractor adjusted, and she notes that she felt good for a few hours after the adjustment, and then the pain returned gradually. Her right heel has been bothering her for a few weeks. She has already seen a chiropractor more than once for her right heel, but she notes that it has persisted. She has tried stretching exercises and ice compression. The pain is localized in her right heel instead of the whole foot. The pain did not start until after she got back from walking a lot. She feels pain right after she wakes up in the morning. Her pain is constant, especially when she stretches the affected area. She stretches it out before she gets out of bed in the morning. She feels better if she wears her tennis shoes, but she cannot wear them all the time.    The patient experiences pain in her right index finger, which she notes is also swollen. She has no tenderness in her right middle finger, but it is swollen too. She believes that she \"jammed\" it. She denies any trauma. She can bend it, but if she tries to make a fist, it has limitations. She has not done anything that has aggravated it. She denies twisting or carrying something that precipitated it. She feels miserable about it. She did not apply any hydrocortisone cream to the peeling of her skin. She has been drinking adequate amounts of water and is trying to drink approximately 60 liters daily.     The patient is feeling better. For the last 3 months, she " has been having issues with her menstrual cycle since she had her surgery.  She currently does not have a gynecologist because her previous one retired. She does not have any preference for a new gynecologist. She does not have issues with her menstrual flow, but she notes that she experiences menstrual cramps and body aches instead. About 3 days before her menstrual cycle, she could barely get out of bed due to body stiffness. Her menstrual cramps have gotten worse.     The following portions of the patient's history were reviewed and updated as appropriate: allergies, current medications, past social history and problem list    Review of Systems   Constitutional:  Positive for activity change.   Genitourinary:  Positive for menstrual problem.   Musculoskeletal:  Positive for arthralgias and myalgias. Negative for gait problem and joint swelling.   Skin: Negative.    Neurological:  Negative for weakness and numbness.     Objective     Vitals:    08/18/23 1137   BP: 124/78   Pulse: 63   Temp: 97.7 øF (36.5 øC)   SpO2: 99%       Physical Exam  Vitals and nursing note reviewed.   Constitutional:       General: She is not in acute distress.     Appearance: Normal appearance. She is well-developed. She is not ill-appearing, toxic-appearing or diaphoretic.   Cardiovascular:      Pulses: Normal pulses.   Musculoskeletal:         General: Swelling (minimal edema 2nd head MCP joint) and tenderness (left plantar heel and right dorsal and palmar head of 2nd MCP joint) present. No deformity or signs of injury.   Skin:     General: Skin is warm and dry.      Coloration: Skin is not pale.      Findings: No bruising, erythema or rash.   Neurological:      Mental Status: She is alert.      Motor: No abnormal muscle tone.      Coordination: Coordination normal.       Assessment & Plan     Diagnoses and all orders for this visit:    1. Foot pain, right (Primary)  -     Ambulatory Referral to Orthopedic Surgery    2. Dysmenorrhea  -      Ambulatory Referral to Obstetrics / Gynecology    3. Tendonitis of right hand    Other orders  -     predniSONE (DELTASONE) 20 MG tablet; Take 1 tablet by mouth Daily.  Dispense: 3 tablet; Refill: 0    1. Foot pain, right  I referred the patient to Dr. José Miguel West for further evaluation and treatment. I advised the patient to continue stretching exercises, ice, and wearing shoes that have some support on the arch. I discussed with her that she possibly needs iontophoresis, topical corticosteroids, or streoid injections.     2. Dysmenorrhea  I referred the patient to obstetrics and gynecology.      3. Tendonitis of the right hand  The patient will start taking prednisone 20 mg once daily for 3 days. I encouraged her to do ice compression.     Transcribed from ambient dictation for Charisse Meyer PA-C by Prasanna Dockery.  08/18/23   13:29 EDT    Patient or patient representative verbalized consent to the visit recording.  I have personally performed the services described in this document as transcribed by the above individual, and it is both accurate and complete.

## 2023-08-25 ENCOUNTER — TELEPHONE (OUTPATIENT)
Dept: FAMILY MEDICINE CLINIC | Facility: CLINIC | Age: 38
End: 2023-08-25
Payer: OTHER GOVERNMENT

## 2023-08-25 RX ORDER — PREDNISONE 20 MG/1
20 TABLET ORAL 2 TIMES DAILY
Qty: 6 TABLET | Refills: 0 | Status: SHIPPED | OUTPATIENT
Start: 2023-08-25

## 2023-08-25 NOTE — TELEPHONE ENCOUNTER
----- Message from Dianna Caicedo sent at 8/25/2023  9:18 AM EDT -----  Regarding: Follow up  Contact: [phone]  Wanted to see if I could try another round of steroids. My hand is still not better. Honestly it's probably a little worse now.

## 2023-08-25 NOTE — TELEPHONE ENCOUNTER
Prescription sent for steroids for the 3 additional days increase dose to twice daily, if this does not help we will refer her to a hand specialist.

## 2023-09-05 ENCOUNTER — TELEPHONE (OUTPATIENT)
Dept: FAMILY MEDICINE CLINIC | Facility: CLINIC | Age: 38
End: 2023-09-05
Payer: OTHER GOVERNMENT

## 2023-09-05 DIAGNOSIS — M77.8 TENDONITIS OF RIGHT HAND: Primary | ICD-10-CM

## 2023-09-05 NOTE — TELEPHONE ENCOUNTER
----- Message from Dianna Caicedo sent at 9/4/2023  5:05 PM EDT -----  Regarding: Follow up  Contact: [phone]  I’m going to have to have the referral for the hand specialist. I’ve been to [facility] before. The pain isn’t coming and going anymore, It’s a constant pain and I’m unable to open and close items. It’s kept me awake all last night.     Thanks!

## 2023-09-29 ENCOUNTER — OFFICE VISIT (OUTPATIENT)
Dept: ORTHOPEDIC SURGERY | Facility: CLINIC | Age: 38
End: 2023-09-29
Payer: OTHER GOVERNMENT

## 2023-09-29 VITALS
HEIGHT: 68 IN | WEIGHT: 237.8 LBS | BODY MASS INDEX: 36.04 KG/M2 | SYSTOLIC BLOOD PRESSURE: 117 MMHG | DIASTOLIC BLOOD PRESSURE: 79 MMHG

## 2023-09-29 DIAGNOSIS — M72.2 PLANTAR FASCIITIS: ICD-10-CM

## 2023-09-29 DIAGNOSIS — M25.571 RIGHT ANKLE PAIN, UNSPECIFIED CHRONICITY: Primary | ICD-10-CM

## 2023-09-29 DIAGNOSIS — M79.671 RIGHT FOOT PAIN: ICD-10-CM

## 2023-09-29 PROCEDURE — 99213 OFFICE O/P EST LOW 20 MIN: CPT | Performed by: ORTHOPAEDIC SURGERY
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
## 2023-10-04 ENCOUNTER — OFFICE VISIT (OUTPATIENT)
Dept: BARIATRICS/WEIGHT MGMT | Facility: CLINIC | Age: 38
End: 2023-10-04
Payer: OTHER GOVERNMENT

## 2023-10-04 VITALS
HEART RATE: 80 BPM | HEIGHT: 68 IN | WEIGHT: 234.5 LBS | TEMPERATURE: 97.9 F | OXYGEN SATURATION: 99 % | DIASTOLIC BLOOD PRESSURE: 70 MMHG | BODY MASS INDEX: 35.54 KG/M2 | SYSTOLIC BLOOD PRESSURE: 108 MMHG

## 2023-10-04 DIAGNOSIS — R79.0 ABNORMAL BLOOD LEVEL OF IRON: ICD-10-CM

## 2023-10-04 DIAGNOSIS — E55.9 VITAMIN D DEFICIENCY: ICD-10-CM

## 2023-10-04 DIAGNOSIS — E66.9 OBESITY, CLASS II, BMI 35-39.9: Primary | ICD-10-CM

## 2023-10-04 DIAGNOSIS — R53.83 FATIGUE, UNSPECIFIED TYPE: ICD-10-CM

## 2023-10-04 PROCEDURE — 99214 OFFICE O/P EST MOD 30 MIN: CPT | Performed by: PHYSICIAN ASSISTANT
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
## 2023-10-07 LAB
25(OH)D3+25(OH)D2 SERPL-MCNC: 33.7 NG/ML (ref 30–100)
ALBUMIN SERPL-MCNC: 5 G/DL (ref 3.9–4.9)
ALBUMIN/GLOB SERPL: 1.9 {RATIO} (ref 1.2–2.2)
ALP SERPL-CCNC: 57 IU/L (ref 44–121)
ALT SERPL-CCNC: 11 IU/L (ref 0–32)
AST SERPL-CCNC: 16 IU/L (ref 0–40)
BASOPHILS # BLD AUTO: 0 X10E3/UL (ref 0–0.2)
BASOPHILS NFR BLD AUTO: 1 %
BILIRUB SERPL-MCNC: 0.9 MG/DL (ref 0–1.2)
BUN SERPL-MCNC: 14 MG/DL (ref 6–20)
BUN/CREAT SERPL: 16 (ref 9–23)
CALCIUM SERPL-MCNC: 9.9 MG/DL (ref 8.7–10.2)
CHLORIDE SERPL-SCNC: 102 MMOL/L (ref 96–106)
CO2 SERPL-SCNC: 24 MMOL/L (ref 20–29)
CREAT SERPL-MCNC: 0.9 MG/DL (ref 0.57–1)
EGFRCR SERPLBLD CKD-EPI 2021: 85 ML/MIN/1.73
EOSINOPHIL # BLD AUTO: 0 X10E3/UL (ref 0–0.4)
EOSINOPHIL NFR BLD AUTO: 0 %
ERYTHROCYTE [DISTWIDTH] IN BLOOD BY AUTOMATED COUNT: 13.1 % (ref 11.7–15.4)
FERRITIN SERPL-MCNC: 73 NG/ML (ref 15–150)
FOLATE SERPL-MCNC: >20 NG/ML
GLOBULIN SER CALC-MCNC: 2.6 G/DL (ref 1.5–4.5)
GLUCOSE SERPL-MCNC: 83 MG/DL (ref 70–99)
HBA1C MFR BLD: 4.9 % (ref 4.8–5.6)
HCT VFR BLD AUTO: 47.5 % (ref 34–46.6)
HGB BLD-MCNC: 15.7 G/DL (ref 11.1–15.9)
IMM GRANULOCYTES # BLD AUTO: 0 X10E3/UL (ref 0–0.1)
IMM GRANULOCYTES NFR BLD AUTO: 0 %
IRON SERPL-MCNC: 84 UG/DL (ref 27–159)
LYMPHOCYTES # BLD AUTO: 2.3 X10E3/UL (ref 0.7–3.1)
LYMPHOCYTES NFR BLD AUTO: 26 %
MCH RBC QN AUTO: 29.3 PG (ref 26.6–33)
MCHC RBC AUTO-ENTMCNC: 33.1 G/DL (ref 31.5–35.7)
MCV RBC AUTO: 89 FL (ref 79–97)
METHYLMALONATE SERPL-SCNC: 159 NMOL/L (ref 0–378)
MONOCYTES # BLD AUTO: 0.5 X10E3/UL (ref 0.1–0.9)
MONOCYTES NFR BLD AUTO: 6 %
NEUTROPHILS # BLD AUTO: 5.9 X10E3/UL (ref 1.4–7)
NEUTROPHILS NFR BLD AUTO: 67 %
PLATELET # BLD AUTO: 232 X10E3/UL (ref 150–450)
POTASSIUM SERPL-SCNC: 4.7 MMOL/L (ref 3.5–5.2)
PREALB SERPL-MCNC: 23 MG/DL (ref 14–35)
PROT SERPL-MCNC: 7.6 G/DL (ref 6–8.5)
RBC # BLD AUTO: 5.36 X10E6/UL (ref 3.77–5.28)
SODIUM SERPL-SCNC: 141 MMOL/L (ref 134–144)
VIT B1 BLD-SCNC: 179.8 NMOL/L (ref 66.5–200)
WBC # BLD AUTO: 8.7 X10E3/UL (ref 3.4–10.8)

## 2023-10-15 PROBLEM — E66.9 OBESITY (BMI 30-39.9): Status: ACTIVE | Noted: 2023-10-15

## 2023-10-15 NOTE — PROGRESS NOTES
[facility]  [address]  [place] [address]   Office Note      Date: 10/16/2023  Patient Name: Dianna Caicedo  MRN: [id number]  : 1985    Subjective     Chief Complaint  Obesity Management follow-up          Dianna Caicedo presents to [facility] for obesity management. Patient has a past surgical history of gastric sleeve in . Reports that her weight has been a lifelong problem including weight gain at various times in her life as early as highschool. Her pre surgery highest weight was 310 and reached a lowest weight of 234. Reports remaining around this weight for the past 9 months. She has some history of food journaling but currently not doing. She is interested in medication. She has used phentermine in the past and did not tolerate with heart palpations. She has never tried xenical in her past.     Highest lifetime weight: 310 pounds. Today's weight is 110 kg (243 lb 8 oz) pounds.   Weight 5 years ago: 270  The patient is exercising with a FITT score of:    Frequency   Intensity Time Strength Training   [x]   0 None  [x]   0 None  [x]   0 None  [x]   0 None    [x]   1 (1-2x/week) [x]   1 (light) []   1 (<10 min) []   1 (1x/week)   []   2 (3-5x/week) []   2 (moderate) []   2 (10-20 min) []   2 (2x/week)   []   3 (daily)   []   3 (moderately hard)  []   4 (very hard) [x]   3 (20-30 min)  []   4 (>30 min) []   3 (3-4x/week)         The following seem to sabotage weight loss efforts:Lack of time for planning & self and boredom eating    Review of Systems   Constitutional:  Negative for appetite change and fatigue.   Eyes:  Negative for blurred vision, double vision and visual disturbance.   Respiratory:  Positive for apnea.    Cardiovascular:  Negative for chest pain and palpitations.   Gastrointestinal:  Positive for GERD. Negative for abdominal pain, constipation, diarrhea, nausea and vomiting.   Endocrine:  "Negative for polydipsia, polyphagia and polyuria.   Musculoskeletal:  Positive for back pain. Negative for arthralgias and myalgias.   Neurological:  Negative for dizziness, tremors, light-headedness, headache and memory problem.        Parasthesias negative   Psychiatric/Behavioral:  Negative for sleep disturbance, depressed mood and stress. The patient is nervous/anxious.        PHQ-9 Total Score: 0       Objective   Body mass index is 37.02 kg/m².  Body composition analysis completed and showed:   %body fat: 44.9    Measurements (in inches)  Neck: 15.5  Chest: 46  Waist: 48  Hips: 48.75  Thighs: 39.5    Vital Signs:   /74   Pulse 73   Ht 172.7 cm (68\")   Wt 110 kg (243 lb 8 oz)   SpO2 99%   BMI 37.02 kg/m²       Physical Exam  Constitutional:       Appearance: Normal appearance. She is obese.   Cardiovascular:      Rate and Rhythm: Normal rate and regular rhythm.      Heart sounds: Normal heart sounds.   Pulmonary:      Effort: Pulmonary effort is normal. No respiratory distress.      Breath sounds: Normal breath sounds.   Skin:     General: Skin is warm and dry.   Neurological:      Mental Status: She is alert and oriented to person, place, and time.   Psychiatric:         Attention and Perception: Attention and perception normal.         Mood and Affect: Mood normal.         Speech: Speech normal.         Behavior: Behavior normal.            Result Review :                   Assessment / Plan       Diagnoses and all orders for this visit:    1. Obesity (BMI 30-39.9) (Primary)  Assessment & Plan:  Patient's (Body mass index is 37.02 kg/m².) indicates that they are morbidly/severely obese (BMI > 40 or > 35 with obesity - related health condition) with health conditions that include obstructive sleep apnea, hypertension, and dyslipidemias . Weight is newly identified. BMI  is above average; BMI management plan is completed. We discussed low calorie, low carb based diet program, portion control, " increasing exercise, pharmacologic options including xenical, and an joe-based approach such as [place] Pal or Lose It.   -- This is an initial visit and patient was referred over from our bariatric department.  She has a past surgical history of gastric sleeve in 2022.  --Body composition analysis reviewed in office today and calorie and macro goals set up based on this information.  --Baritastic food journal was set up.  Patient's #1 goal over the next 2 weeks is just to start to analyze what type of food she is eating and how her macros are set up.  Ask her to bring in her food journal to next office visit for brief review.  We are looking in particular to start analyzing where her carbohydrates, calories, protein, and fiber are staying.  We have also set a goal of 100 ounces of water a day.  --She had recent fasting labs but we are getting a few additional ones today including fasting insulin.  Her most recent labs were reviewed she is within normal limits of vitamin D but on the low side of normal so we are treating with a prescription vitamin D as we are getting into the winter months.  --As far as food goals were going to continue to focus on having 3 meals a day which include 1 lean protein and 1 vegetable at every meal including breakfast.  We are going to try to stay away from more processed carbohydrates including things with added sugars or white flour.  --Patient is interested in medication and we did discuss limitations on insurance.  She does have a contraindication of phentermine from heart palpitations in the past as well as Wellbutrin as she is on an additional SSRI.  Starting Xenical today as this is 1 medication she has not tried.  May consider a GLP-1 such as Saxenda or Wegovy if follow their medications are not tolerated.    Orders:  -     Insulin, Total  -     Lipid Panel  -     TSH  -     Urine Drug Screen - Urine, Clean Catch  -     Comprehensive Metabolic Panel  -     orlistat (XENICAL)  120 MG capsule; Take 1 capsule by mouth 3 (Three) Times a Day With Meals for 30 days.  Dispense: 90 capsule; Refill: 0    2. History of long-term treatment with high-risk medication  -     Urine Drug Screen - Urine, Clean Catch    3. Hyperlipidemia, unspecified hyperlipidemia type  -     Lipid Panel    4. Low vitamin D level  -     vitamin D (ERGOCALCIFEROL) 1.25 MG (15964 UT) capsule capsule; Take 1 capsule by mouth 1 (One) Time Per Week for 90 days.  Dispense: 12 capsule; Refill: 0         Topics of discussion included obesity as a disease, nutritional education on food groups, exercise, and medications. Patient was instructed in adequate protein, controlled carb and controlled fat intake. Patient received instructions on using the medicines as a tool in controlling their weight with nutritional and behavioral changes. Risks and benefits were discussed. I believe the potential benefits of medication helping to decrease weight outweighs the risks. Patient is to try nutritonal/behavioral changes only first. Patient received our clinic education booklet.   Our patient consent form was reviewed including potential risks of weight loss. We also reviewed our confidentiality and HIPPA statements. Patients current FITT score was reviewed along with current capability for exercise tolerance and a patient will work towards a FITT score of: Patient's past medical history was reviewed in detail and barriers to weight loss were identified and discussed. Past efforts at weight reduction on their own as well as under medical provider supervision were documented and discussed.  I advised patient to continue routine care with their Primary Care Provider. Nutritional recommendations and goals were reviewed based on body composition analysis including basal metabolic rate. Goal set for Calories,  adjusted for exercise calories burnt as well as Macronutrient. Take other medications and supplements as directed. Increase physical  activity as tolerated without side effects.       I spent 75 minutes on this date of service. This time includes time spent by me in the following activities:preparing for the visit, counseling and educating the patient/family/caregiver, ordering medications, tests, or procedures and documenting information in the medical record.    Follow Up   Return in about 2 weeks (around 10/30/2023).  Patient was given instructions and counseling regarding her condition or for health maintenance advice. Please see specific information pulled into the AVS if appropriate.     Chuyita Johnson, APRN  10/16/2023

## 2023-10-16 ENCOUNTER — OFFICE VISIT (OUTPATIENT)
Dept: BARIATRICS/WEIGHT MGMT | Facility: CLINIC | Age: 38
End: 2023-10-16
Payer: OTHER GOVERNMENT

## 2023-10-16 VITALS
HEIGHT: 68 IN | WEIGHT: 243.5 LBS | SYSTOLIC BLOOD PRESSURE: 120 MMHG | OXYGEN SATURATION: 99 % | HEART RATE: 73 BPM | DIASTOLIC BLOOD PRESSURE: 74 MMHG | BODY MASS INDEX: 36.9 KG/M2

## 2023-10-16 DIAGNOSIS — E66.9 OBESITY (BMI 30-39.9): Primary | ICD-10-CM

## 2023-10-16 DIAGNOSIS — R79.89 LOW VITAMIN D LEVEL: ICD-10-CM

## 2023-10-16 DIAGNOSIS — Z79.899 HISTORY OF LONG-TERM TREATMENT WITH HIGH-RISK MEDICATION: ICD-10-CM

## 2023-10-16 DIAGNOSIS — E78.5 HYPERLIPIDEMIA, UNSPECIFIED HYPERLIPIDEMIA TYPE: ICD-10-CM

## 2023-10-16 PROCEDURE — 99417 PROLNG OP E/M EACH 15 MIN: CPT | Performed by: NURSE PRACTITIONER

## 2023-10-16 PROCEDURE — 99215 OFFICE O/P EST HI 40 MIN: CPT | Performed by: NURSE PRACTITIONER

## 2023-10-16 RX ORDER — ORLISTAT 120 MG/1
120 CAPSULE ORAL
Qty: 90 CAPSULE | Refills: 0 | Status: SHIPPED | OUTPATIENT
Start: 2023-10-16 | End: 2023-11-15

## 2023-10-16 RX ORDER — ERGOCALCIFEROL 1.25 MG/1
50000 CAPSULE ORAL WEEKLY
Qty: 12 CAPSULE | Refills: 0 | Status: SHIPPED | OUTPATIENT
Start: 2023-10-16 | End: 2024-01-14

## 2023-10-16 RX ORDER — FOLIC ACID 1 MG/1
1 TABLET ORAL DAILY
Qty: 90 TABLET | Refills: 0 | OUTPATIENT
Start: 2023-10-16

## 2023-10-16 NOTE — ASSESSMENT & PLAN NOTE
Patient's (Body mass index is 37.02 kg/m².) indicates that they are morbidly/severely obese (BMI > 40 or > 35 with obesity - related health condition) with health conditions that include obstructive sleep apnea, hypertension, and dyslipidemias . Weight is newly identified. BMI  is above average; BMI management plan is completed. We discussed low calorie, low carb based diet program, portion control, increasing exercise, pharmacologic options including xenical, and an joe-based approach such as MyFitness Pal or Lose It.   -- This is an initial visit and patient was referred over from our bariatric department.  She has a past surgical history of gastric sleeve in 2022.  --Body composition analysis reviewed in office today and calorie and macro goals set up based on this information.  --[employer] food journal was set up.  Patient's #1 goal over the next 2 weeks is just to start to analyze what type of food she is eating and how her macros are set up.  Ask her to bring in her food journal to next office visit for brief review.  We are looking in particular to start analyzing where her carbohydrates, calories, protein, and fiber are staying.  We have also set a goal of 100 ounces of water a day.  --She had recent fasting labs but we are getting a few additional ones today including fasting insulin.  Her most recent labs were reviewed she is within normal limits of vitamin D but on the low side of normal so we are treating with a prescription vitamin D as we are getting into the winter months.  --As far as food goals were going to continue to focus on having 3 meals a day which include 1 lean protein and 1 vegetable at every meal including breakfast.  We are going to try to stay away from more processed carbohydrates including things with added sugars or white flour.  --Patient is interested in medication and we did discuss limitations on insurance.  She does have a contraindication of phentermine from heart palpitations  in the past as well as Wellbutrin as she is on an additional SSRI.  Starting Xenical today as this is 1 medication she has not tried.  May consider a GLP-1 such as Saxenda or Wegovy if follow their medications are not tolerated.

## 2023-10-17 LAB
ALBUMIN SERPL-MCNC: 4.3 G/DL (ref 3.9–4.9)
ALBUMIN/GLOB SERPL: 2.2 {RATIO} (ref 1.2–2.2)
ALP SERPL-CCNC: 46 IU/L (ref 44–121)
ALT SERPL-CCNC: 15 IU/L (ref 0–32)
AMPHETAMINES UR QL SCN: NEGATIVE NG/ML
AST SERPL-CCNC: 16 IU/L (ref 0–40)
BARBITURATES UR QL SCN: NEGATIVE NG/ML
BENZODIAZ UR QL SCN: NEGATIVE NG/ML
BILIRUB SERPL-MCNC: 0.7 MG/DL (ref 0–1.2)
BUN SERPL-MCNC: 10 MG/DL (ref 6–20)
BUN/CREAT SERPL: 12 (ref 9–23)
BZE UR QL SCN: NEGATIVE NG/ML
CALCIUM SERPL-MCNC: 9 MG/DL (ref 8.7–10.2)
CANNABINOIDS UR QL SCN: NEGATIVE NG/ML
CHLORIDE SERPL-SCNC: 104 MMOL/L (ref 96–106)
CHOLEST SERPL-MCNC: 154 MG/DL (ref 100–199)
CO2 SERPL-SCNC: 25 MMOL/L (ref 20–29)
CREAT SERPL-MCNC: 0.83 MG/DL (ref 0.57–1)
CREAT UR-MCNC: 92.5 MG/DL (ref 20–300)
EGFRCR SERPLBLD CKD-EPI 2021: 94 ML/MIN/1.73
GLOBULIN SER CALC-MCNC: 2 G/DL (ref 1.5–4.5)
GLUCOSE SERPL-MCNC: 80 MG/DL (ref 70–99)
HDLC SERPL-MCNC: 45 MG/DL
INSULIN SERPL-ACNC: 6.9 UIU/ML (ref 2.6–24.9)
LABORATORY COMMENT REPORT: NORMAL
LDLC SERPL CALC-MCNC: 88 MG/DL (ref 0–99)
METHADONE UR QL SCN: NEGATIVE NG/ML
OPIATES UR QL SCN: NEGATIVE NG/ML
OXYCODONE+OXYMORPHONE UR QL SCN: NEGATIVE NG/ML
PCP UR QL: NEGATIVE NG/ML
PH UR: 6.7 [PH] (ref 4.5–8.9)
POTASSIUM SERPL-SCNC: 4.5 MMOL/L (ref 3.5–5.2)
PROPOXYPH UR QL SCN: NEGATIVE NG/ML
PROT SERPL-MCNC: 6.3 G/DL (ref 6–8.5)
SODIUM SERPL-SCNC: 142 MMOL/L (ref 134–144)
TRIGL SERPL-MCNC: 116 MG/DL (ref 0–149)
TSH SERPL DL<=0.005 MIU/L-ACNC: 2.8 UIU/ML (ref 0.45–4.5)
VLDLC SERPL CALC-MCNC: 21 MG/DL (ref 5–40)

## 2023-10-23 ENCOUNTER — PRIOR AUTHORIZATION (OUTPATIENT)
Dept: BARIATRICS/WEIGHT MGMT | Facility: CLINIC | Age: 38
End: 2023-10-23
Payer: OTHER GOVERNMENT

## 2023-10-24 NOTE — TELEPHONE ENCOUNTER
DENIED due to malabsorption risk of being status post bariatric surgery. Would be around $700 for patient cost out of pocket.

## 2023-10-26 ENCOUNTER — PRIOR AUTHORIZATION (OUTPATIENT)
Dept: BARIATRICS/WEIGHT MGMT | Facility: CLINIC | Age: 38
End: 2023-10-26
Payer: OTHER GOVERNMENT

## 2023-10-30 ENCOUNTER — OFFICE VISIT (OUTPATIENT)
Dept: BARIATRICS/WEIGHT MGMT | Facility: CLINIC | Age: 38
End: 2023-10-30
Payer: OTHER GOVERNMENT

## 2023-10-30 VITALS
WEIGHT: 238.4 LBS | OXYGEN SATURATION: 98 % | HEIGHT: 68 IN | BODY MASS INDEX: 36.13 KG/M2 | SYSTOLIC BLOOD PRESSURE: 138 MMHG | DIASTOLIC BLOOD PRESSURE: 90 MMHG | HEART RATE: 82 BPM

## 2023-10-30 DIAGNOSIS — E66.9 OBESITY (BMI 30-39.9): Primary | ICD-10-CM

## 2023-10-30 PROCEDURE — 99215 OFFICE O/P EST HI 40 MIN: CPT | Performed by: NURSE PRACTITIONER

## 2023-10-30 RX ORDER — PEN NEEDLE, DIABETIC 30 GX3/16"
1 NEEDLE, DISPOSABLE MISCELLANEOUS AS NEEDED
Qty: 30 EACH | Refills: 0 | Status: SHIPPED | OUTPATIENT
Start: 2023-10-30 | End: 2024-02-07

## 2023-10-30 NOTE — ASSESSMENT & PLAN NOTE
Patient's (Body mass index is 36.25 kg/m².) indicates that they are obese (BMI >30) with health conditions that include obstructive sleep apnea and dyslipidemias . Weight is improving with lifestyle modifications. BMI  is above average; BMI management plan is completed. We discussed low calorie, low carb based diet program, portion control, increasing exercise, pharmacologic options including Saxenda, and an joe-based approach such as [employer] Pal or Lose It.   -- This is the initial follow-up and patient is down around 5-1/2 pounds since last being seen approximately 2 weeks ago.  --#1 goal was to food journal and she is an excellent job at this getting about 6 out of 7 days each week.  This is going to continue to be her #1 goal and again encouraged that were not always looking for perfection but just learning about her foods as we have them.  She did allow herself to have a bagel and cream cheese 1 day and we are still losing weight.  --We are noticing that she is doing well with her protein goal on many days but will continue to try to reach that goal our fiber is very low.  We discussed adding psyllium husk supplementation which she plans to buy over-the-counter.  Advised to titrate up to 9 pills a day broken up at 3 pills at each meal.  Be sure to take with a full glass of water each time she takes psyllium husk.  Also continue to look for ways that she can add fiber into her diet and continue to see where her fiber is staying.  --Saxenda prescription sent in.  Message sent to staff to see if this was approved.  Titration and side effects discussed.

## 2023-10-30 NOTE — PROGRESS NOTES
[facility]  [address]  [place] [address]     Office Note      Date: 10/30/2023  Patient Name: Dianna Caicedo  MRN: [id number]  : 1985    Subjective     Chief Complaint  Obesity Management follow-up    Dianna Caicedo presents to [facility] for obesity management.     Patient is unsure with weight loss progress. Appetite is poorly controlled.  Patient reports that Xenical was denied.  Currently is not taking any medications.  The patient is taking multivitamin and is not taking fish oil.  The patient is using a food journal.  She has journaled about 6 out of 7 days each week and is doing an excellent job of this.  The patient rates current efforts as 8 out of 10.    B:yogurt with granola  S: protein coffee  L:Chicken tenders  D: protein and vegetables.     The patient is exercising with a FITT score of:    Frequency Intensity Time Strength Training   []   0, none []   0 []   0 [x]   0   [x]   1 (1-2x/week) [x]   1 (light) []   1 (<10 min) []   1 (1x/week)   []   2 (3-5x/week) []   2 (moderate) []   2 (10-20 min) []   2 (2x/week)   []   3 (daily) []   3 (moderately hard)  []   4 (very hard) []   3 (20-30 min)  [x]   4 (>30 min) []   3 (3-4x/week)     Review of Systems   Constitutional:  Negative for appetite change and fatigue.   Eyes:  Negative for blurred vision, double vision and visual disturbance.   Cardiovascular:  Negative for chest pain and palpitations.   Gastrointestinal:  Negative for abdominal pain, constipation, diarrhea, nausea, vomiting and GERD.   Endocrine: Negative for polydipsia, polyphagia and polyuria.   Musculoskeletal:  Negative for arthralgias, back pain and myalgias.   Neurological:  Negative for dizziness, tremors, light-headedness, headache and memory problem.        Parasthesias negative   Psychiatric/Behavioral:  Negative for sleep disturbance, depressed mood and stress. The patient is not  "nervous/anxious.        Objective   Start weight: 243.8 pounds.    Total Loss lb/%Loss of beginning body weight (BBW): -2.21lb/-5.4%  Change in weight since last visit: -5.4    Recent Weight History:   Wt Readings from Last 6 Encounters:   10/30/23 108 kg (238 lb 6.4 oz)   10/16/23 110 kg (243 lb 8 oz)   10/04/23 106 kg (234 lb 8 oz)   09/29/23 108 kg (237 lb 12.8 oz)   08/18/23 110 kg (242 lb 3.2 oz)   07/17/23 106 kg (234 lb 8 oz)         Body mass index is 36.25 kg/m².   Body composition analysis completed and showed:   Body Fat %: 47.5    Measurements (in inches)  Waist Circumference: 47    Vital Signs:   /90 (BP Location: Left arm, Patient Position: Sitting)   Pulse 82   Ht 172.7 cm (68\")   Wt 108 kg (238 lb 6.4 oz)   SpO2 98%   BMI 36.25 kg/m²       Physical Exam  Vitals reviewed.   Constitutional:       Appearance: She is obese. She is not ill-appearing.   Pulmonary:      Effort: Pulmonary effort is normal.   Neurological:      Mental Status: She is alert.   Psychiatric:         Attention and Perception: Attention and perception normal.         Behavior: Behavior is cooperative.        Result Review :     Common labs          4/17/2023    09:33 10/4/2023    10:36 10/16/2023    11:24   Common Labs   Glucose 77  83  80    BUN 16  14  10    Creatinine 0.81  0.90  0.83    Sodium 141  141  142    Potassium 5.1  4.7  4.5    Chloride 104  102  104    Calcium 9.6  9.9  9.0    Total Protein 7.0  7.6  6.3    Albumin 4.4  5.0  4.3    Total Bilirubin 0.8  0.9  0.7    Alkaline Phosphatase 59  57  46    AST (SGOT) 15  16  16    ALT (SGPT) 13  11  15    WBC 8.1  8.7     Hemoglobin 14.6  15.7     Hematocrit 42.9  47.5     Platelets 243  232     Total Cholesterol   154    Triglycerides   116    HDL Cholesterol   45    LDL Cholesterol    88    Hemoglobin A1C  4.9                Assessment / Plan        Diagnoses and all orders for this visit:    1. Obesity (BMI 30-39.9) (Primary)  Assessment & Plan:  Patient's (Body " mass index is 36.25 kg/m².) indicates that they are obese (BMI >30) with health conditions that include obstructive sleep apnea and dyslipidemias . Weight is improving with lifestyle modifications. BMI  is above average; BMI management plan is completed. We discussed low calorie, low carb based diet program, portion control, increasing exercise, pharmacologic options including Saxenda, and an joe-based approach such as [employer] Pal or Lose It.   -- This is the initial follow-up and patient is down around 5-1/2 pounds since last being seen approximately 2 weeks ago.  --#1 goal was to food journal and she is an excellent job at this getting about 6 out of 7 days each week.  This is going to continue to be her #1 goal and again encouraged that were not always looking for perfection but just learning about her foods as we have them.  She did allow herself to have a bagel and cream cheese 1 day and we are still losing weight.  --We are noticing that she is doing well with her protein goal on many days but will continue to try to reach that goal our fiber is very low.  We discussed adding psyllium husk supplementation which she plans to buy over-the-counter.  Advised to titrate up to 9 pills a day broken up at 3 pills at each meal.  Be sure to take with a full glass of water each time she takes psyllium husk.  Also continue to look for ways that she can add fiber into her diet and continue to see where her fiber is staying.  --Saxenda prescription sent in.  Message sent to staff to see if this was approved.  Titration and side effects discussed.     Orders:  -     Liraglutide (SAXENDA) 18 MG/3ML injection pen; Inject 3 mg under the skin into the appropriate area as directed Daily for 30 days. Titrate up an instructed starting at 0.6mg daily  Dispense: 15 mL; Refill: 0  -     Insulin Pen Needle (Pen Needles) 32G X 4 MM misc; Use 1 each As Needed (with medication) for up to 100 days.  Dispense: 30 each; Refill: 0    Other  orders  -     psyllium (METAMUCIL) 0.52 g capsule; Take 2 capsules by mouth 2 (Two) Times a Day. Titrate up as directed. Take with a minimum of 8oz water.        We discussed the risks, benefits, and limitations of treatments. Continue medications and OTC supplements as discussed. Patient verbalizes understanding of and agreement with management plan.     Follow Up   Return in about 4 weeks (around 11/27/2023).  Patient was given instructions and counseling regarding her condition or for health maintenance advice. Please see specific information pulled into the AVS if appropriate.     I spent 40 minutes on this date of service. This time includes time spent by me in the following activities:preparing for the visit, counseling and educating the patient/family/caregiver, ordering medications, tests, or procedures and documenting information in the medical record.    Chuyita Johnson, APRN  10/30/2023

## 2023-10-31 ENCOUNTER — PRIOR AUTHORIZATION (OUTPATIENT)
Dept: BARIATRICS/WEIGHT MGMT | Facility: CLINIC | Age: 38
End: 2023-10-31
Payer: OTHER GOVERNMENT

## 2023-11-02 NOTE — TELEPHONE ENCOUNTER
Message from plan:   Case Id:[id number]  Status:Approved  Review Type:Prior Auth  Coverage Start Date:10/01/2023  Coverage End Date:04/29/2024

## 2023-11-29 NOTE — PROGRESS NOTES
Office Note      Date: 2023  Patient Name: Dianna Caicedo  MRN: [id number]  : 1985    This service was conducted via [employer].  Patient is located in [place]  Provider is located at her office address. The use of a video visit has been reviewed with the patient and informed consent has been obtained.    Subjective     Chief Complaint  Obesity Management follow-up    Subjective          Dianna Caicedo presents to [facility] via telehealth for obesity management. She is being seen virtually but stopped by the office to be weighed. She started the Saxenda and is about to start 3.0 with only mild nausea which is tolerated. She is still finding she is eating sour candy out of habit than hunger. She is working in increasing a vegetable to every meal. She is currently not weighing herself daily. She is working hard to increase her gym time to 3 hours per week.     Patient is satisfied with weight loss progress. Appetite is well controlled. Reports no side effects of prescribed medications today.   The patient is exercising with a FITT score of:    Frequency Intensity Time Strength Training   []   0, none []   0 []   0 []   0   []   1 (1-2x/week) []   1 (light) []   1 (<10 min) []   1 (1x/week)   [x]   2 (3-5x/week) []   2 (moderate) []   2 (10-20 min) [x]   2 (2x/week)   []   3 (daily) [x]   3 (moderately hard)  []   4 (very hard) []   3 (20-30 min)  [x]   4 (>30 min) []   3 (3-4x/week)       Review of Systems   Constitutional:  Negative for appetite change and fatigue.   HENT:  Negative for ear discharge.    Eyes:  Negative for blurred vision, double vision and visual disturbance.   Cardiovascular:  Negative for chest pain and palpitations.   Gastrointestinal:  Positive for nausea (mild with medicaiton). Negative for abdominal pain, constipation, diarrhea, vomiting and GERD.   Endocrine: Negative for polydipsia, polyphagia and polyuria.   Musculoskeletal:   "Negative for arthralgias, back pain and myalgias.   Neurological:  Negative for dizziness, tremors, light-headedness, headache and memory problem.        Parasthesias negative   Psychiatric/Behavioral:  Negative for sleep disturbance, depressed mood and stress. The patient is not nervous/anxious.        Objective   Body mass index is 35.37 kg/m².  Start weight: 243.8 pounds.    Total weight loss: -11 pounds/4.5%  Change in weight since last visit: -6    Wt Readings from Last 6 Encounters:   11/30/23 0833 106 kg (232 lb 9.6 oz)   10/30/23 0902 108 kg (238 lb 6.4 oz)   10/16/23 0931 110 kg (243 lb 8 oz)   10/04/23 0942 106 kg (234 lb 8 oz)   09/29/23 1011 108 kg (237 lb 12.8 oz)   08/18/23 1137 110 kg (242 lb 3.2 oz)       General:  .well developed; well nourished  no acute distress  obese      /60   Pulse 85   Ht 172.7 cm (68\")   Wt 106 kg (232 lb 9.6 oz)   BMI 35.37 kg/m²       Result Review :     Common labs          4/17/2023    09:33 10/4/2023    10:36 10/16/2023    11:24   Common Labs   Glucose 77  83  80    BUN 16  14  10    Creatinine 0.81  0.90  0.83    Sodium 141  141  142    Potassium 5.1  4.7  4.5    Chloride 104  102  104    Calcium 9.6  9.9  9.0    Total Protein 7.0  7.6  6.3    Albumin 4.4  5.0  4.3    Total Bilirubin 0.8  0.9  0.7    Alkaline Phosphatase 59  57  46    AST (SGOT) 15  16  16    ALT (SGPT) 13  11  15    WBC 8.1  8.7     Hemoglobin 14.6  15.7     Hematocrit 42.9  47.5     Platelets 243  232     Total Cholesterol   154    Triglycerides   116    HDL Cholesterol   45    LDL Cholesterol    88    Hemoglobin A1C  4.9                 Assessment and Plan    Diagnoses and all orders for this visit:    1. Obesity (BMI 30-39.9) (Primary)  Assessment & Plan:  Patient's (Body mass index is 35.37 kg/m².) indicates that they are obese (BMI >30) with health conditions that include obstructive sleep apnea, hypertension, and dyslipidemias . Weight is improving with treatment. BMI  is above " average; BMI management plan is completed. We discussed low calorie, low carb based diet program, portion control, increasing exercise, pharmacologic options including Wegovy, and an joe-based approach such as [employer] Pal or Lose It.   --This is a follow up visit and she is down around 6 lbs.   -- She is currently in the process of titrating up to 3.0 mg Saxenda.  Discussed that after taking a full 7 days of 3 mg to transition over to Wegovy 1.7 mg.  Continue Saxenda after the transition.  Scription sent to pharmacy of her choice.  --She is worked really hard on increasing vegetables and lean protein working to continue this effort.  The goal is to continue to be mindful to food prep ahead of time and have those healthy choices readily available.  --She is working hard on increasing her exercise and getting the full 3 hours and per week.  Keep up the great effort.  --Currently weighing only monthly. Consider daily weighing daily first thing in morning.   --Be mindlful to stay away from candy related to boredom    Orders:  -     Semaglutide-Weight Management (Wegovy) 1.7 MG/0.75ML solution auto-injector; Inject 0.75 mL under the skin into the appropriate area as directed 1 (One) Time Per Week for 30 days.  Dispense: 3 mL; Refill: 0        We discussed the risks, benefits, and limitations of treatments. Continue medications and OTC supplements as discussed. Patient verbalizes understanding of and agreement with management plan.     Follow Up   Return in about 4 weeks (around 12/28/2023).  I spent 40 minutes on this date of service. This time includes time spent by me in the following activities:preparing for the visit, counseling and educating the patient/family/caregiver, ordering medications, tests, or procedures and documenting information in the medical record.  Patient was given instructions and counseling regarding her condition or for health maintenance advice. Please see specific information pulled into the  AVS if appropriate.     Chuyita Johnson, APRN

## 2023-11-30 ENCOUNTER — TELEMEDICINE (OUTPATIENT)
Dept: BARIATRICS/WEIGHT MGMT | Facility: CLINIC | Age: 38
End: 2023-11-30
Payer: OTHER GOVERNMENT

## 2023-11-30 VITALS
HEIGHT: 68 IN | HEART RATE: 85 BPM | SYSTOLIC BLOOD PRESSURE: 122 MMHG | BODY MASS INDEX: 35.25 KG/M2 | DIASTOLIC BLOOD PRESSURE: 60 MMHG | WEIGHT: 232.6 LBS

## 2023-11-30 DIAGNOSIS — E66.9 OBESITY (BMI 30-39.9): Primary | ICD-10-CM

## 2023-11-30 PROCEDURE — 99213 OFFICE O/P EST LOW 20 MIN: CPT | Performed by: NURSE PRACTITIONER

## 2023-11-30 RX ORDER — SEMAGLUTIDE 1.7 MG/.75ML
1.7 INJECTION, SOLUTION SUBCUTANEOUS WEEKLY
Qty: 3 ML | Refills: 0 | Status: SHIPPED | OUTPATIENT
Start: 2023-11-30 | End: 2023-12-30

## 2023-11-30 NOTE — ASSESSMENT & PLAN NOTE
Patient's (Body mass index is 35.37 kg/m².) indicates that they are obese (BMI >30) with health conditions that include obstructive sleep apnea, hypertension, and dyslipidemias . Weight is improving with treatment. BMI  is above average; BMI management plan is completed. We discussed low calorie, low carb based diet program, portion control, increasing exercise, pharmacologic options including Wegovy, and an joe-based approach such as [employer] Pal or Lose It.   --This is a follow up visit and she is down around 6 lbs.   -- She is currently in the process of titrating up to 3.0 mg Saxenda.  Discussed that after taking a full 7 days of 3 mg to transition over to Wegovy 1.7 mg.  Continue Saxenda after the transition.  Scription sent to pharmacy of her choice.  --She is worked really hard on increasing vegetables and lean protein working to continue this effort.  The goal is to continue to be mindful to food prep ahead of time and have those healthy choices readily available.  --She is working hard on increasing her exercise and getting the full 3 hours and per week.  Keep up the great effort.  --Currently weighing only monthly. Consider daily weighing daily first thing in morning.   --Be mindlful to stay away from candy related to boredom

## 2023-12-06 ENCOUNTER — APPOINTMENT (OUTPATIENT)
Dept: CT IMAGING | Facility: HOSPITAL | Age: 38
End: 2023-12-06
Payer: OTHER GOVERNMENT

## 2023-12-06 ENCOUNTER — TELEPHONE (OUTPATIENT)
Dept: FAMILY MEDICINE CLINIC | Facility: CLINIC | Age: 38
End: 2023-12-06
Payer: OTHER GOVERNMENT

## 2023-12-06 ENCOUNTER — HOSPITAL ENCOUNTER (EMERGENCY)
Facility: HOSPITAL | Age: 38
Discharge: HOME | End: 2023-12-06
Attending: EMERGENCY MEDICINE
Payer: OTHER GOVERNMENT

## 2023-12-06 ENCOUNTER — APPOINTMENT (OUTPATIENT)
Dept: GENERAL RADIOLOGY | Facility: HOSPITAL | Age: 38
End: 2023-12-06
Payer: OTHER GOVERNMENT

## 2023-12-06 VITALS
WEIGHT: 233 LBS | HEART RATE: 75 BPM | RESPIRATION RATE: 16 BRPM | OXYGEN SATURATION: 96 % | TEMPERATURE: 97.6 F | HEIGHT: 68 IN | SYSTOLIC BLOOD PRESSURE: 137 MMHG | BODY MASS INDEX: 35.31 KG/M2 | DIASTOLIC BLOOD PRESSURE: 69 MMHG

## 2023-12-06 DIAGNOSIS — S70.02XA CONTUSION OF LEFT HIP, INITIAL ENCOUNTER: ICD-10-CM

## 2023-12-06 DIAGNOSIS — S30.0XXA CONTUSION OF COCCYX, INITIAL ENCOUNTER: ICD-10-CM

## 2023-12-06 DIAGNOSIS — S09.90XA INJURY OF HEAD, INITIAL ENCOUNTER: ICD-10-CM

## 2023-12-06 DIAGNOSIS — R55 SYNCOPE AND COLLAPSE: Primary | ICD-10-CM

## 2023-12-06 LAB
ALBUMIN SERPL-MCNC: 4.4 G/DL (ref 3.5–5.2)
ALBUMIN/GLOB SERPL: 1.5 G/DL
ALP SERPL-CCNC: 54 U/L (ref 39–117)
ALT SERPL W P-5'-P-CCNC: 10 U/L (ref 1–33)
ANION GAP SERPL CALCULATED.3IONS-SCNC: 10 MMOL/L (ref 5–15)
AST SERPL-CCNC: 16 U/L (ref 1–32)
BASOPHILS # BLD AUTO: 0.03 10*3/MM3 (ref 0–0.2)
BASOPHILS NFR BLD AUTO: 0.3 % (ref 0–1.5)
BILIRUB SERPL-MCNC: 0.7 MG/DL (ref 0–1.2)
BUN SERPL-MCNC: 14 MG/DL (ref 6–20)
BUN/CREAT SERPL: 17.3 (ref 7–25)
CALCIUM SPEC-SCNC: 9.1 MG/DL (ref 8.6–10.5)
CHLORIDE SERPL-SCNC: 102 MMOL/L (ref 98–107)
CO2 SERPL-SCNC: 28 MMOL/L (ref 22–29)
CREAT SERPL-MCNC: 0.81 MG/DL (ref 0.57–1)
DEPRECATED RDW RBC AUTO: 40.1 FL (ref 37–54)
EGFRCR SERPLBLD CKD-EPI 2021: 96.6 ML/MIN/1.73
EOSINOPHIL # BLD AUTO: 0 10*3/MM3 (ref 0–0.4)
EOSINOPHIL NFR BLD AUTO: 0 % (ref 0.3–6.2)
ERYTHROCYTE [DISTWIDTH] IN BLOOD BY AUTOMATED COUNT: 12.4 % (ref 12.3–15.4)
GLOBULIN UR ELPH-MCNC: 2.9 GM/DL
GLUCOSE SERPL-MCNC: 77 MG/DL (ref 65–99)
HCT VFR BLD AUTO: 44.9 % (ref 34–46.6)
HGB BLD-MCNC: 15.2 G/DL (ref 12–15.9)
HOLD SPECIMEN: NORMAL
IMM GRANULOCYTES # BLD AUTO: 0.04 10*3/MM3 (ref 0–0.05)
IMM GRANULOCYTES NFR BLD AUTO: 0.4 % (ref 0–0.5)
LYMPHOCYTES # BLD AUTO: 2.05 10*3/MM3 (ref 0.7–3.1)
LYMPHOCYTES NFR BLD AUTO: 21.9 % (ref 19.6–45.3)
MAGNESIUM SERPL-MCNC: 2.2 MG/DL (ref 1.6–2.6)
MCH RBC QN AUTO: 29.8 PG (ref 26.6–33)
MCHC RBC AUTO-ENTMCNC: 33.9 G/DL (ref 31.5–35.7)
MCV RBC AUTO: 88 FL (ref 79–97)
MONOCYTES # BLD AUTO: 0.49 10*3/MM3 (ref 0.1–0.9)
MONOCYTES NFR BLD AUTO: 5.2 % (ref 5–12)
NEUTROPHILS NFR BLD AUTO: 6.75 10*3/MM3 (ref 1.7–7)
NEUTROPHILS NFR BLD AUTO: 72.2 % (ref 42.7–76)
NRBC BLD AUTO-RTO: 0 /100 WBC (ref 0–0.2)
PLATELET # BLD AUTO: 215 10*3/MM3 (ref 140–450)
PMV BLD AUTO: 8.9 FL (ref 6–12)
POTASSIUM SERPL-SCNC: 3.9 MMOL/L (ref 3.5–5.2)
PROT SERPL-MCNC: 7.3 G/DL (ref 6–8.5)
RBC # BLD AUTO: 5.1 10*6/MM3 (ref 3.77–5.28)
SODIUM SERPL-SCNC: 140 MMOL/L (ref 136–145)
TROPONIN T SERPL HS-MCNC: <6 NG/L
WBC NRBC COR # BLD AUTO: 9.36 10*3/MM3 (ref 3.4–10.8)
WHOLE BLOOD HOLD COAG: NORMAL
WHOLE BLOOD HOLD SPECIMEN: NORMAL

## 2023-12-06 PROCEDURE — 72220 X-RAY EXAM SACRUM TAILBONE: CPT

## 2023-12-06 PROCEDURE — 96374 THER/PROPH/DIAG INJ IV PUSH: CPT

## 2023-12-06 PROCEDURE — 80053 COMPREHEN METABOLIC PANEL: CPT | Performed by: EMERGENCY MEDICINE

## 2023-12-06 PROCEDURE — 36415 COLL VENOUS BLD VENIPUNCTURE: CPT

## 2023-12-06 PROCEDURE — 99284 EMERGENCY DEPT VISIT MOD MDM: CPT

## 2023-12-06 PROCEDURE — 70450 CT HEAD/BRAIN W/O DYE: CPT

## 2023-12-06 PROCEDURE — 83735 ASSAY OF MAGNESIUM: CPT | Performed by: EMERGENCY MEDICINE

## 2023-12-06 PROCEDURE — 85025 COMPLETE CBC W/AUTO DIFF WBC: CPT | Performed by: EMERGENCY MEDICINE

## 2023-12-06 PROCEDURE — 93005 ELECTROCARDIOGRAM TRACING: CPT | Performed by: EMERGENCY MEDICINE

## 2023-12-06 PROCEDURE — 25010000002 KETOROLAC TROMETHAMINE PER 15 MG: Performed by: NURSE PRACTITIONER

## 2023-12-06 PROCEDURE — 25810000003 SODIUM CHLORIDE 0.9 % SOLUTION: Performed by: NURSE PRACTITIONER

## 2023-12-06 PROCEDURE — 84484 ASSAY OF TROPONIN QUANT: CPT | Performed by: EMERGENCY MEDICINE

## 2023-12-06 PROCEDURE — 73502 X-RAY EXAM HIP UNI 2-3 VIEWS: CPT

## 2023-12-06 RX ORDER — SODIUM CHLORIDE 0.9 % (FLUSH) 0.9 %
10 SYRINGE (ML) INJECTION AS NEEDED
Status: DISCONTINUED | OUTPATIENT
Start: 2023-12-06 | End: 2023-12-06 | Stop reason: HOSPADM

## 2023-12-06 RX ORDER — KETOROLAC TROMETHAMINE 15 MG/ML
15 INJECTION, SOLUTION INTRAMUSCULAR; INTRAVENOUS ONCE
Status: COMPLETED | OUTPATIENT
Start: 2023-12-06 | End: 2023-12-06

## 2023-12-06 RX ADMIN — SODIUM CHLORIDE 1000 ML: 9 INJECTION, SOLUTION INTRAVENOUS at 15:18

## 2023-12-06 RX ADMIN — KETOROLAC TROMETHAMINE 15 MG: 15 INJECTION, SOLUTION INTRAMUSCULAR; INTRAVENOUS at 17:01

## 2023-12-06 NOTE — DISCHARGE INSTRUCTIONS
Follow-up with primary care physician.    Follow-up with neuro.  Follow-up with the syncope clinic.        Return to the ER for any concerns.

## 2023-12-06 NOTE — TELEPHONE ENCOUNTER
"Patient's mother, Erika called to report that the patient had passed out this morning in the kitchen... she fell straight back and hit her head on the floor.  She had a knot on the back of head now and has been feeling nauseous ever since.  During the episode she lost her bladder and the mother took about 30 to 40 minutes to get her back up and cleaned up.  They were requesting to be seen today but I was advised to tell them to go to the ED or UC but they were saying that they would rather see Charisse and not have to wait \"all day\" to be seen with her feeling like that.  I told them I would relay the message to her provider and then she asked for a call back at [phone] to speak with someone.  Thank you.   "
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
## 2023-12-06 NOTE — Clinical Note
[facility] EMERGENCY DEPARTMENT  [address]  [facility] [address]  Phone: [phone]    Dianna Caicedo was seen and treated in our emergency department on 12/6/2023.  She may return to work on 12/08/2023.         Thank you for choosing [facility].    Megan Perez, AVINASH

## 2023-12-06 NOTE — TELEPHONE ENCOUNTER
This sounds like an emergency situation patient absolutely needs to go to the emergency department in my medical opinion and does not need to drive but needs to have someone drive her.

## 2023-12-06 NOTE — TELEPHONE ENCOUNTER
Patient and mother was verbally notified she needs to go to the ED ASAP for evaluation, Both verbalized acknowledgement

## 2023-12-09 LAB
QT INTERVAL: 380 MS
QTC INTERVAL: 433 MS

## 2023-12-11 ENCOUNTER — HOSPITAL ENCOUNTER (OUTPATIENT)
Dept: CARDIOLOGY | Facility: HOSPITAL | Age: 38
Discharge: HOME | End: 2023-12-11
Payer: OTHER GOVERNMENT

## 2023-12-11 ENCOUNTER — OFFICE VISIT (OUTPATIENT)
Dept: CARDIOLOGY | Facility: HOSPITAL | Age: 38
End: 2023-12-11
Payer: OTHER GOVERNMENT

## 2023-12-11 VITALS
HEART RATE: 94 BPM | WEIGHT: 238.2 LBS | RESPIRATION RATE: 18 BRPM | TEMPERATURE: 97.7 F | SYSTOLIC BLOOD PRESSURE: 143 MMHG | HEIGHT: 68 IN | BODY MASS INDEX: 36.1 KG/M2 | DIASTOLIC BLOOD PRESSURE: 92 MMHG | OXYGEN SATURATION: 99 %

## 2023-12-11 DIAGNOSIS — R55 SYNCOPE AND COLLAPSE: ICD-10-CM

## 2023-12-11 DIAGNOSIS — R55 SYNCOPE AND COLLAPSE: Primary | ICD-10-CM

## 2023-12-11 DIAGNOSIS — I10 ESSENTIAL HYPERTENSION: ICD-10-CM

## 2023-12-11 PROCEDURE — 99213 OFFICE O/P EST LOW 20 MIN: CPT | Performed by: NURSE PRACTITIONER

## 2023-12-11 NOTE — PROGRESS NOTES
[facility] Heart Monitor Documentation    Dianna Caicedo  1985  [id number]  12/11/23      [] ZIO XT Patch  Model [id number] Prescribed for  Days    Serial Number: (N + 9 Digits) N   Apply-By Date on Box:   USPS Tracking Number:   USPS Tracking        [x] [employer] MINI PLUS Mobile Cardiac Telemetry  Model BGMINIPLUS Prescribed for 30 Days    Serial Number: (BGM + 7 Digits) [id number]  Shipped-By Date on Box: 11/17  UPS Tracking Number: [id number]  UPS Tracking      [] [employer]ian MINI Holter Monitor  Model BGMINIEL Prescribed for  Days    Serial Number: (7 Digits)   Shipped-By Date on Box:   UPS Tracking Number: [id number]  UPS Tracking        This monitor was applied to the patient's chest and checked for proper functioning.  Ms. Dianna Caicedo was instructed in the proper use of this monitor.  She was given the opportunity to ask questions and left the office with the device 's instruction manual.    Sydni Nails MA, 15:32 EST, 12/11/23                  [facility] 8[facility]

## 2023-12-11 NOTE — PROGRESS NOTES
"[facility]  Heart and Valve Center    Chief Complaint  Syncope    Subjective    History of Present Illness {CC  Problem List  Visit  Diagnosis   Encounters  Notes  Medications  Labs  Result Review Imaging  Media :23}     Dianna Caicedo is a 38 y.o. female with NGUYEN, gout, anxiety, depression, HTN, bariatric surgery who presents today for evaluation of syncope at the request of Dr. Owen.    Patient presented to the ED on 12/6/2023 after for a syncopal event.  She reports she was standing in her kitchen and was not feeling well and the next anterior members was waking up on the floor to her  trying to wake her up.  She had associated bladder incontinence and she did bite her tongue.  EKG and labs benign.  CT head within normal limits.  She has a history of syncopal events since 2006.  Reports having a tilt table test several years ago which she said she passed out, data deficit.  She wore a 30-day monitor and had an echo with bubble study in 2020, both within normal limits.    Reports 3 syncopal episodes in the past 10 years. She reports on the day of the ED visit she was getting ready to give her first Wegovy shot and started to feel like she was \"swimming\". Had not eaten yet. Denies palpitations. Since she passed out she has felt slight lightheaded and \"discombobulated\"       Drinks 40-60 oz of water a day  Mother has seizure disorder  No family hx of SCD or cardiac issues        Objective     Vital Signs:   Vitals:    12/11/23 1436 12/11/23 1437 12/11/23 1438   BP: 145/88 139/89 143/92   BP Location: Right arm Left arm Left arm   Patient Position: Sitting Standing Sitting   Cuff Size: Adult Adult Adult   Pulse: 94 95 94   Resp:   18   Temp: 97.7 °F (36.5 °C) 97.7 °F (36.5 °C) 97.7 °F (36.5 °C)   TempSrc:   Temporal   SpO2: 98% 99% 99%   Weight:   108 kg (238 lb 3.2 oz)   Height:   172.7 cm (68\")     Body mass index is 36.22 kg/m².  Physical Exam  Vitals reviewed. "   Constitutional:       Appearance: Normal appearance.   HENT:      Head: Normocephalic.   Neck:      Vascular: No carotid bruit.   Cardiovascular:      Rate and Rhythm: Normal rate and regular rhythm.      Pulses: Normal pulses.      Heart sounds: Normal heart sounds, S1 normal and S2 normal. No murmur heard.  Pulmonary:      Effort: Pulmonary effort is normal. No respiratory distress.      Breath sounds: Normal breath sounds.   Chest:      Chest wall: No tenderness.   Abdominal:      General: Abdomen is flat.      Palpations: Abdomen is soft.   Musculoskeletal:      Cervical back: Neck supple.      Right lower leg: No edema.      Left lower leg: No edema.   Skin:     General: Skin is warm and dry.   Neurological:      General: No focal deficit present.      Mental Status: She is alert and oriented to person, place, and time. Mental status is at baseline.   Psychiatric:         Mood and Affect: Mood normal.         Behavior: Behavior normal.         Thought Content: Thought content normal.              Result Review  Data Reviewed:{ Labs  Result Review  Imaging  Med Tab  Media :23}   Single High Sensitivity Troponin T (12/06/2023 15:02)  Comprehensive Metabolic Panel (12/06/2023 15:02)  CBC & Differential (12/06/2023 15:02)  Magnesium (12/06/2023 15:02)  ECG 12 Lead ED Triage Standing Order; Syncope (12/06/2023 14:30)  Adult Transthoracic Echo Complete W/ Cont if Necessary Per Protocol (03/11/2020 17:39)  Mobile Cardiac Outpatient Telemetry (02/28/2020 15:33)  Duplex Venous Lower Extremity - Left (02/26/2020 13:57)         Assessment and Plan {CC Problem List  Visit Diagnosis  ROS  Review (Popup)  Health Maintenance  Quality  BestPractice  Medications  SmartSets  SnapShot Encounters  Media :23}   1. Syncope and collapse  - Suspect vasovagal syncope. Will try and get previous TTT. Encouraged adequate hydration. Will go ahead and repeat monitor since symptoms happened abruptly and were different than  prior symptoms  - Referral to neurology due to associated bowel incontinence, she also bit her tongue  - Encouraged adequate hydration  - Ambulatory Referral to Neurology  - Mobile Cardiac Outpatient Telemetry; Future    2. Hypertension  - She was able to stop meds since bariatric surgery. Encouraged her to start monitoring at home. Reports history of white coat HTN      Follow Up {Instructions Charge Capture  Follow-up Communications :23}   Return in about 6 weeks (around 1/22/2024) for Video Visit, Monitor results.    Patient was given instructions and counseling regarding her condition or for health maintenance advice. Please see specific information pulled into the AVS if appropriate.  Advised to call the Heart and Valve Center with any questions, concerns, or worsening symptoms.

## 2023-12-13 ENCOUNTER — TELEPHONE (OUTPATIENT)
Dept: CARDIOLOGY | Facility: HOSPITAL | Age: 38
End: 2023-12-13
Payer: OTHER GOVERNMENT

## 2023-12-13 NOTE — TELEPHONE ENCOUNTER
Caller: Dianna Caicedo    Relationship: Self    Best call back number: [phone]    What is the best time to reach you: ANY     Who are you requesting to speak with (clinical staff, provider,  specific staff member): CLINICAL     What was the call regarding: PT STATES SHE WAS REFERRED TO A NEUROLOGIST, BUT THEY DON'T HAVE AVAILABILITY UNTIL MARCH. PT IS REQUESTING THE REFERRAL BE SENT TO A DIFFERENT PROVIDER: [facility] PRETT[facility]      Is it okay if the provider responds through MyChart: YES

## 2023-12-14 ENCOUNTER — TELEPHONE (OUTPATIENT)
Dept: OBSTETRICS AND GYNECOLOGY | Facility: CLINIC | Age: 38
End: 2023-12-14
Payer: OTHER GOVERNMENT

## 2023-12-14 NOTE — TELEPHONE ENCOUNTER
Can we try sending it urgently? Do I need to put in a new order? We could also try .  She also needs to follow up with her PCP if she is still having symptoms.     I did look at her monitor and have not seen any abnormalities.

## 2023-12-15 ENCOUNTER — OFFICE VISIT (OUTPATIENT)
Dept: OBSTETRICS AND GYNECOLOGY | Facility: CLINIC | Age: 38
End: 2023-12-15
Payer: OTHER GOVERNMENT

## 2023-12-15 VITALS
DIASTOLIC BLOOD PRESSURE: 90 MMHG | SYSTOLIC BLOOD PRESSURE: 140 MMHG | BODY MASS INDEX: 35.77 KG/M2 | WEIGHT: 236 LBS | HEIGHT: 68 IN

## 2023-12-15 DIAGNOSIS — N94.6 DYSMENORRHEA: Primary | ICD-10-CM

## 2023-12-15 DIAGNOSIS — N93.9 ABNORMAL UTERINE BLEEDING (AUB): ICD-10-CM

## 2023-12-15 NOTE — ASSESSMENT & PLAN NOTE
Counseled on the differential diagnosis for chronic pelvic pain including cyclic and non cyclic etiologies. Patient also informed of the potential for psychosocial stressors, gastroenterologic or urologic concerns that may require further evaluation. Patient counseled for plan routine follow-up with, appropriate pain control while evaluating etiology. Pelvis diffusely mildly tender on bimanual exam. Together we came up the following plan:    Ultrasound ordered  Cannot use hormones due to clot history  Op note reviewed from gastrectomy, no mention of any visualization of the pelvis.   Likely desires surgical management (needs complete f/u of syncope first)

## 2023-12-15 NOTE — ASSESSMENT & PLAN NOTE
Counseled for options for management of abnormal uterine bleeding to include observation, oral contraceptive pills, progestins, levonorgestrel IUD, and, if appropriate, surgical options including dilation and curettage, endometrial ablation or hysterectomy. Discussed a general stepwise approach to management. Risk benefits and alternatives for each option specific to the patient condition including her history of a clot were reviewed. Questions were answered and patient verbalized understanding. The patient elected for ultrasound and possibly consideration of robot assisted hysterectomy.    IMPROVE-DD Application Not Available
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
## 2023-12-27 RX ORDER — OMEPRAZOLE 40 MG/1
40 CAPSULE, DELAYED RELEASE ORAL DAILY
Qty: 180 CAPSULE | Refills: 3 | Status: SHIPPED | OUTPATIENT
Start: 2023-12-27

## 2024-01-02 NOTE — PROGRESS NOTES
[facility]  [address]  [place] [address]     Office Note      Date: 2024  Patient Name: Dianna Caicedo  MRN: [id number]  : 1985    Subjective     Chief Complaint  Obesity Management follow-up    Dianna Caicedo presents to [facility] for obesity management.     Patient is satisfied with weight loss progress. Appetite is moderately controlled. Reports no side effects of prescribed medications today. The patient is not taking multivitamin and is not taking fish oil.  The patient is using a food journal.  The patient rates current efforts as 7 out of 10. She had an episode of passing out on  and wore a heart monitor and and following up with neurology.     The patient is exercising with a FITT score of:    Frequency Intensity Time Strength Training   []   0, none []   0 []   0 [x]   0   [x]   1 (1-2x/week) [x]   1 (light) []   1 (<10 min) []   1 (1x/week)   [x]   2 (3-5x/week) [x]   2 (moderate) []   2 (10-20 min) []   2 (2x/week)   []   3 (daily) []   3 (moderately hard)  []   4 (very hard) []   3 (20-30 min)  [x]   4 (>30 min) []   3 (3-4x/week)     Review of Systems   Constitutional:  Positive for appetite change. Negative for fatigue.   Eyes:  Negative for visual disturbance.   Cardiovascular:  Negative for chest pain and palpitations.   Gastrointestinal:  Positive for constipation. Negative for indigestion.   Neurological:  Positive for syncope and light-headedness.       Objective   Start weight: 243.8 pounds.    Total Loss lb/%Loss of beginning body weight (BBW): -14.6lb/-5.99%  Change in weight since last visit: -3.7    Recent Weight History:   Wt Readings from Last 6 Encounters:   24 104 kg (229 lb 3.2 oz)   12/15/23 107 kg (236 lb)   23 108 kg (238 lb 3.2 oz)   23 106 kg (233 lb)   23 106 kg (232 lb 9.6 oz)   10/30/23 108 kg (238 lb 6.4 oz)     Body mass index is 34.85 kg/m².  "  Body composition analysis completed and showed:   Body Fat %: 47.1    Measurements (in inches)  Waist Circumference: 45    Vital Signs:   /88 (BP Location: Left arm, Patient Position: Sitting)   Pulse 93   Ht 172.7 cm (68\")   Wt 104 kg (229 lb 3.2 oz)   SpO2 99%   BMI 34.85 kg/m²       Physical Exam  Vitals reviewed.   Constitutional:       Appearance: She is obese. She is not ill-appearing.   Pulmonary:      Effort: Pulmonary effort is normal.   Neurological:      Mental Status: She is alert.   Psychiatric:         Attention and Perception: Attention and perception normal.         Behavior: Behavior is cooperative.        Result Review :     Common labs          10/4/2023    10:36 10/16/2023    11:24 12/6/2023    15:02   Common Labs   Glucose 83  80  77    BUN 14  10  14    Creatinine 0.90  0.83  0.81    Sodium 141  142  140    Potassium 4.7  4.5  3.9    Chloride 102  104  102    Calcium 9.9  9.0  9.1    Total Protein 7.6  6.3     Albumin 5.0  4.3  4.4    Total Bilirubin 0.9  0.7  0.7    Alkaline Phosphatase 57  46  54    AST (SGOT) 16  16  16    ALT (SGPT) 11  15  10    WBC 8.7   9.36    Hemoglobin 15.7   15.2    Hematocrit 47.5   44.9    Platelets 232   215    Total Cholesterol  154     Triglycerides  116     HDL Cholesterol  45     LDL Cholesterol   88     Hemoglobin A1C 4.9                 Assessment / Plan        Diagnoses and all orders for this visit:    1. Obesity (BMI 30-39.9) (Primary)  Assessment & Plan:  Patient's (Body mass index is 34.85 kg/m².) indicates that they are obese (BMI >30) with health conditions that include hypertension and dyslipidemias . Weight is improving with treatment. BMI  is above average; BMI management plan is completed. We discussed low calorie, low carb based diet program, portion control, increasing exercise, pharmacologic options including Wegovy, and an joe-based approach such as Actions Pal or Lose It.     --This is a follow up visit and she is down around " 3.7 lbs  --Continue Wegovy 1.7mg. Add daily stool softer to prevent constipation. Okay to use daily  --A1C today.   --Gaol to go to gym at min 3 days a week. Goal of 150 min of moderate exercise  --Gaol to restart logging for 30 days and incorporate vegetable at every meal including    Orders:  -     Hemoglobin A1c  -     Semaglutide-Weight Management (Wegovy) 1.7 MG/0.75ML solution auto-injector; Inject 0.75 mL under the skin into the appropriate area as directed 1 (One) Time Per Week for 30 days.  Dispense: 3 mL; Refill: 0    2. Encounter for long-term current use of medication  -     Hemoglobin A1c    3. Drug-induced constipation  -     docusate sodium (COLACE) 250 MG capsule; Take 1 capsule by mouth Daily.  Dispense: 365 capsule; Refill: 0    4. Gastroesophageal reflux disease, unspecified whether esophagitis present  -     omeprazole (priLOSEC) 40 MG capsule; Take 1 capsule by mouth Daily.  Dispense: 180 capsule; Refill: 1      We discussed the risks, benefits, and limitations of treatments. Continue medications and OTC supplements as discussed. Patient verbalizes understanding of and agreement with management plan.     Follow Up   Return in about 4 weeks (around 2/5/2024).  Patient was given instructions and counseling regarding her condition or for health maintenance advice. Please see specific information pulled into the AVS if appropriate.     I spent 40 minutes on this date of service. This time includes time spent by me in the following activities:preparing for the visit, counseling and educating the patient/family/caregiver, ordering medications, tests, or procedures and documenting information in the medical record.    Chuyita Johnson, APRN  01/08/2024

## 2024-01-08 ENCOUNTER — OFFICE VISIT (OUTPATIENT)
Dept: OBSTETRICS AND GYNECOLOGY | Facility: CLINIC | Age: 39
End: 2024-01-08
Payer: OTHER GOVERNMENT

## 2024-01-08 ENCOUNTER — OFFICE VISIT (OUTPATIENT)
Dept: BARIATRICS/WEIGHT MGMT | Facility: CLINIC | Age: 39
End: 2024-01-08
Payer: OTHER GOVERNMENT

## 2024-01-08 VITALS
HEIGHT: 68 IN | SYSTOLIC BLOOD PRESSURE: 140 MMHG | DIASTOLIC BLOOD PRESSURE: 88 MMHG | OXYGEN SATURATION: 99 % | HEART RATE: 93 BPM | WEIGHT: 229.2 LBS | BODY MASS INDEX: 34.74 KG/M2

## 2024-01-08 VITALS — DIASTOLIC BLOOD PRESSURE: 82 MMHG | BODY MASS INDEX: 35.28 KG/M2 | WEIGHT: 232 LBS | SYSTOLIC BLOOD PRESSURE: 124 MMHG

## 2024-01-08 DIAGNOSIS — N94.6 DYSMENORRHEA: ICD-10-CM

## 2024-01-08 DIAGNOSIS — K59.03 DRUG-INDUCED CONSTIPATION: ICD-10-CM

## 2024-01-08 DIAGNOSIS — E66.9 OBESITY (BMI 30-39.9): Primary | ICD-10-CM

## 2024-01-08 DIAGNOSIS — Z79.899 ENCOUNTER FOR LONG-TERM CURRENT USE OF MEDICATION: ICD-10-CM

## 2024-01-08 DIAGNOSIS — N93.9 ABNORMAL UTERINE BLEEDING (AUB): Primary | ICD-10-CM

## 2024-01-08 DIAGNOSIS — Z86.718 PERSONAL HISTORY OF DVT (DEEP VEIN THROMBOSIS): ICD-10-CM

## 2024-01-08 DIAGNOSIS — K21.9 GASTROESOPHAGEAL REFLUX DISEASE, UNSPECIFIED WHETHER ESOPHAGITIS PRESENT: ICD-10-CM

## 2024-01-08 PROCEDURE — 99213 OFFICE O/P EST LOW 20 MIN: CPT | Performed by: OBSTETRICS & GYNECOLOGY

## 2024-01-08 PROCEDURE — 99215 OFFICE O/P EST HI 40 MIN: CPT | Performed by: NURSE PRACTITIONER

## 2024-01-08 RX ORDER — DOCUSATE SODIUM 250 MG
250 CAPSULE ORAL DAILY
Qty: 365 CAPSULE | Refills: 0 | Status: SHIPPED | OUTPATIENT
Start: 2024-01-08 | End: 2025-01-07

## 2024-01-08 RX ORDER — SEMAGLUTIDE 1.7 MG/.75ML
1.7 INJECTION, SOLUTION SUBCUTANEOUS WEEKLY
Qty: 3 ML | Refills: 0 | Status: SHIPPED | OUTPATIENT
Start: 2024-01-08 | End: 2024-02-07

## 2024-01-08 RX ORDER — SEMAGLUTIDE 1.7 MG/.75ML
1.7 INJECTION, SOLUTION SUBCUTANEOUS WEEKLY
COMMUNITY
End: 2024-01-08 | Stop reason: SDUPTHER

## 2024-01-08 RX ORDER — OMEPRAZOLE 40 MG/1
40 CAPSULE, DELAYED RELEASE ORAL DAILY
Qty: 180 CAPSULE | Refills: 1 | Status: SHIPPED | OUTPATIENT
Start: 2024-01-08
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
## 2024-01-08 NOTE — ASSESSMENT & PLAN NOTE
Patient's (Body mass index is 34.85 kg/m².) indicates that they are obese (BMI >30) with health conditions that include hypertension and dyslipidemias . Weight is improving with treatment. BMI  is above average; BMI management plan is completed. We discussed low calorie, low carb based diet program, portion control, increasing exercise, pharmacologic options including Wegovy, and an joe-based approach such as Advasense Pal or Lose It.     --This is a follow up visit and she is down around 3.7 lbs  --Continue Wegovy 1.7mg. Add daily stool softer to prevent constipation. Okay to use daily  --A1C today.   --Gaol to go to gym at min 3 days a week. Goal of 150 min of moderate exercise  --Gaol to restart logging for 30 days and incorporate vegetable at every meal including

## 2024-01-08 NOTE — ASSESSMENT & PLAN NOTE
Counseled for options for management of abnormal uterine bleeding to include observation, oral contraceptive pills, progestins, levonorgestrel IUD, and, if appropriate, surgical options including dilation and curettage, endometrial ablation or hysterectomy. Discussed a general stepwise approach to management. Risk benefits and alternatives for each option specific to the patient condition including her hx of DVT were reviewed. Questions were answered and patient verbalized understanding. The patient elected for Robotic hysterectomy. Return for preop.

## 2024-01-09 LAB — HBA1C MFR BLD: 4.6 % (ref 4.8–5.6)

## 2024-01-23 NOTE — PROGRESS NOTES
"[facility]  [facility] Val[facility] Center  Telemedicine Visit      Mode of Visit: Video  Location of patient: other: private area at work  You have chosen to receive care through a telehealth visit.  Does the patient consent to use a video/audio connection for your medical care today? Yes  The visit included audio and video interaction. No technical issues occurred during this visit.     Chief Complaint  Follow-up (syncope)    History of Present Illness    Dianna Caicedo is a 38 y.o. female with NGUYEN, gout, anxiety, depression, HTN, bariatric surgery  who presents today as a telemedicine follow up of syncope.     Patient presented to the ED on 12/6/2023 after for a syncopal event.  She reports she was standing in her kitchen and was not feeling well and the next thing she remembers was waking up on the floor to her  trying to wake her up.  She had associated bladder incontinence and she did bite her tongue.  EKG and labs benign.  CT head within normal limits.  She has a history of syncopal events since 2006.  Reports having a tilt table test several years ago which was positive for cardioinhibitory syncope. She wore a 30-day monitor and had an echo with bubble study in 2020, both within normal limits. Since recent syncopal event was different we went ahead and placed her in another 30 day monitor which was negative for arrhythmias.     Has neuro appt in March. She reports that she had a headache and had intermittent episodes of near syncope. Currently asymptomatic. She has not been monitoring her blood pressures. Denies palpitations or chest pain.    Requests clearance for hysterectomy 3/8      Objective     Vital Signs:   Vitals:    01/24/24 0822   Weight: 104 kg (229 lb)   Height: 172.7 cm (68\")     Body mass index is 34.82 kg/m².    Virtual Visit Physical Exam  Physical Exam  Constitutional:       Appearance: Normal appearance.   HENT:      Head: Normocephalic.   Pulmonary:      Effort: " Pulmonary effort is normal. No respiratory distress.   Neurological:      Mental Status: She is alert and oriented to person, place, and time.   Psychiatric:         Mood and Affect: Mood normal.         Behavior: Behavior normal.         Thought Content: Thought content normal.              Result Review  Data Reviewed:{ Labs  Result Review  Imaging  Med Tab  Media :23}   SCANNED - CARDIOLOGY (12/15/2023)  Adult Transthoracic Echo Complete W/ Cont if Necessary Per Protocol (03/11/2020 17:39)  Mobile Cardiac Outpatient Telemetry (02/28/2020 15:33)  Mobile Cardiac Outpatient Telemetry (12/11/2023 15:33)           Assessment and Plan {CC Problem List  Visit Diagnosis  ROS  Review (Popup)  Health Maintenance  Quality  BestPractice  Medications  SmartSets  SnapShot Encounters  Media :23}   1. Syncope and collapse  - No arrhythmias on 2 prolonged monitors. Echo normal 2020  - Cardioinhibitory syncope noted on remote TTT. Advised to remain adequately hydrated (at least 2L of water), judicious salt intake, moderate strength compression stockings, avoid prolonged standing, and regular aerobic exercise using a recumbent bike.   -She has been on Zoloft and currently on fluoxetine. May benefit from BB therapy if BP elevated but she prefers not to start at this time  - Neuro appt in March    2. Essential hypertension  - Encouraged her to get a new blood pressure cuff and to start monitoring blood pressure.  Advised to call if blood pressure consistently greater than 130/80 and will consider a trial of beta-blocker therapy    3. Preop cardiovascular exam  -  Revised Cardiac Risk Index (RCRI) is 0, which places the patient at 3.9% risk for cardiac event 30 days following noncardiac surgery. Patient is able to perform >/= 4 METS of physical activity. From a cardiac standpoint, appears to be at an acceptable risk for noncardiac surgery. No further cardiac testing necessary. Reviewed risks with patient and patient  verbalized understanding      Will get patient another follow up with Dr. Hassan for second opinion of cardioinhibitory syncope.    Follow Up {Instructions Charge Capture  Follow-up Communications :23}   Return if symptoms worsen or fail to improve.    Patient was given instructions and counseling regarding her condition or for health maintenance advice. Please see specific information pulled into the AVS if appropriate.  Advised to call the [facility] with any questions, concerns, or worsening symptoms.    Dictated Utilizing Dragon Dictation

## 2024-01-24 ENCOUNTER — TELEMEDICINE (OUTPATIENT)
Dept: CARDIOLOGY | Facility: HOSPITAL | Age: 39
End: 2024-01-24
Payer: OTHER GOVERNMENT

## 2024-01-24 VITALS — WEIGHT: 229 LBS | HEIGHT: 68 IN | BODY MASS INDEX: 34.71 KG/M2

## 2024-01-24 DIAGNOSIS — I10 ESSENTIAL HYPERTENSION: ICD-10-CM

## 2024-01-24 DIAGNOSIS — Z01.810 PREOP CARDIOVASCULAR EXAM: ICD-10-CM

## 2024-01-24 DIAGNOSIS — R55 SYNCOPE AND COLLAPSE: Primary | ICD-10-CM

## 2024-02-14 ENCOUNTER — TELEPHONE (OUTPATIENT)
Dept: FAMILY MEDICINE CLINIC | Facility: CLINIC | Age: 39
End: 2024-02-14
Payer: OTHER GOVERNMENT

## 2024-02-14 NOTE — TELEPHONE ENCOUNTER
----- Message from Dianna Caicedo sent at 2/14/2024  3:35 PM EST -----  Regarding: Dermatology Referral   Contact: [phone]  Can you please put a referral in for [facility] at [address]. I’m going to try to be seen while being off for surgery.     Thank you!   Dianna Caicedo

## 2024-02-15 ENCOUNTER — TELEPHONE (OUTPATIENT)
Dept: FAMILY MEDICINE CLINIC | Facility: CLINIC | Age: 39
End: 2024-02-15
Payer: OTHER GOVERNMENT

## 2024-02-15 DIAGNOSIS — L98.9 SKIN LESION: Primary | ICD-10-CM

## 2024-02-15 NOTE — TELEPHONE ENCOUNTER
----- Message from Dianna Caicedo sent at 2/14/2024  6:41 PM EST -----  Regarding: Dermatology Referral   Contact: [phone]    Can you please put a referral in for [facility] at [address]. I’m going to try to be seen while being off for surgery.      Thank you!   Dianna Caicedo        I have a few places that I want to have looked at, one on my check that I thought was a pimple, but it gets bigger and then it’ll go down in size. It’s on my cheekbone area.

## 2024-02-15 NOTE — PROGRESS NOTES
[facility]  [address]  [place] [address]     Office Note      Date: 2024  Patient Name: Dianna Caicedo  MRN: [id number]  : 1985    Subjective     Chief Complaint  Obesity Management follow-up    Dianna Caicedo presents to [facility] for obesity management.     Patient is satisfied with weight loss progress. Appetite is moderately controlled. Reports no side effects of prescribed medications today, except mild constipation. She started colace last visit and it has helped control this. The patient is taking multivitamin and is not taking fish oil.  The patient is using a food journal.  The patient rates current efforts as 6 out of 10. Reports she is having a hysterectomy next month. She has not had many symptoms of hypoglycemia but has had an occasional episode that was elevated with eating.     The patient is exercising with a FITT score of:    Frequency Intensity Time Strength Training   []   0, none []   0 []   0 [x]   0   [x]   1 (1-2x/week) [x]   1 (light) []   1 (<10 min) []   1 (1x/week)   []   2 (3-5x/week) []   2 (moderate) []   2 (10-20 min) []   2 (2x/week)   []   3 (daily) []   3 (moderately hard)  []   4 (very hard) []   3 (20-30 min)  [x]   4 (>30 min) []   3 (3-4x/week)     Review of Systems   Constitutional:  Negative for appetite change and fatigue.   Eyes:  Negative for visual disturbance.   Cardiovascular:  Negative for chest pain and palpitations.   Gastrointestinal:  Positive for constipation (controlled with colace). Negative for indigestion.   Neurological:  Negative for light-headedness.   All other systems reviewed and are negative.    Objective   Start weight: 243.8 pounds.    Total Loss lb/%Loss of beginning body weight (BBW): -15.4 lb/-6.32 %  Change in weight since last visit: -0.8    Recent Weight History:   Wt Readings from Last 6 Encounters:   24 104 kg (228 lb 6.4 oz)  "  01/24/24 104 kg (229 lb)   01/08/24 105 kg (232 lb)   01/08/24 104 kg (229 lb 3.2 oz)   12/15/23 107 kg (236 lb)   12/11/23 108 kg (238 lb 3.2 oz)     Body mass index is 34.73 kg/m².   Body composition analysis completed and showed:   Body Fat %: 45.7    Measurements (in inches)  Waist Circumference: 44    Vital Signs:   /88 (BP Location: Left arm, Patient Position: Sitting)   Pulse 98   Ht 172.7 cm (68\")   Wt 104 kg (228 lb 6.4 oz)   BMI 34.73 kg/m²       Physical Exam  Vitals reviewed.   Constitutional:       Appearance: She is obese. She is not ill-appearing.   Pulmonary:      Effort: Pulmonary effort is normal.   Neurological:      Mental Status: She is alert.   Psychiatric:         Attention and Perception: Attention and perception normal.         Behavior: Behavior is cooperative.      Result Review :     Common labs          10/16/2023    11:24 12/6/2023    15:02 1/8/2024    09:06   Common Labs   Glucose 80  77     BUN 10  14     Creatinine 0.83  0.81     Sodium 142  140     Potassium 4.5  3.9     Chloride 104  102     Calcium 9.0  9.1     Total Protein 6.3      Albumin 4.3  4.4     Total Bilirubin 0.7  0.7     Alkaline Phosphatase 46  54     AST (SGOT) 16  16     ALT (SGPT) 15  10     WBC  9.36     Hemoglobin  15.2     Hematocrit  44.9     Platelets  215     Total Cholesterol 154      Triglycerides 116      HDL Cholesterol 45      LDL Cholesterol  88      Hemoglobin A1C   4.60          Assessment / Plan        Diagnoses and all orders for this visit:    1. Obesity (BMI 30-39.9) (Primary)  Assessment & Plan:  Patient's (Body mass index is 34.73 kg/m².) indicates that they are obese (BMI >30) with health conditions that include hypertension and dyslipidemias . Weight is improving with treatment. BMI  is above average; BMI management plan is completed. We discussed low calorie, low carb based diet program, portion control, increasing exercise, pharmacologic options including Wegovy, and an joe-based " approach such as MyFitness Pal or Lose It.   --This is a follow up visit and she is down around a half of pounds  -- Patient is currently taking Wegovy 1.7 mg.  A1c was done at last visit and mildly low.  We did discuss not increasing related to that information at this visit.  We will repeat the A1c in a couple months and adjust therapy if needed.  We did discuss not necessarily restricting carbohydrates significantly below.  Did discuss that the quality of the carbohydrates make a big difference focusing on plant-based sources and whole grains.  --Currently journaling around 10 days.  Goal to journal no less than 20 out of 30 days this coming month and asked her to bring in for brief review at next office visit  --We did set a goal of exercise last month and she is trying to reach these goals.  She has a walking goal 30 minutes a day except for herself working to keep that this coming month.  --Is a sign that she does have a hysterectomy scheduled for this coming month before being seen by us again.  --Keep at wegovy 1.7 related to low A1C. Repeat A1C in 2 months.    Orders:  -     Semaglutide-Weight Management (Wegovy) 1.7 MG/0.75ML solution auto-injector; Inject 0.75 mL under the skin into the appropriate area as directed 1 (One) Time Per Week for 30 days.  Dispense: 3 mL; Refill: 1    2. Fatigue, unspecified type  -     cyanocobalamin injection 1,000 mcg      We discussed the risks, benefits, and limitations of treatments. Continue medications and OTC supplements as discussed. Patient verbalizes understanding of and agreement with management plan.     Follow Up   Return in about 4 weeks (around 3/18/2024).  Patient was given instructions and counseling regarding her condition or for health maintenance advice. Please see specific information pulled into the AVS if appropriate.     I spent 40 minutes on this date of service. This time includes time spent by me in the following activities:preparing for the visit,  counseling and educating the patient/family/caregiver, ordering medications, tests, or procedures and documenting information in the medical record.    Chuyita Johnson, APRN  02/19/2024

## 2024-02-19 ENCOUNTER — OFFICE VISIT (OUTPATIENT)
Dept: BARIATRICS/WEIGHT MGMT | Facility: CLINIC | Age: 39
End: 2024-02-19
Payer: OTHER GOVERNMENT

## 2024-02-19 VITALS
DIASTOLIC BLOOD PRESSURE: 88 MMHG | WEIGHT: 228.4 LBS | HEIGHT: 68 IN | HEART RATE: 98 BPM | SYSTOLIC BLOOD PRESSURE: 132 MMHG | BODY MASS INDEX: 34.62 KG/M2

## 2024-02-19 DIAGNOSIS — E66.9 OBESITY (BMI 30-39.9): Primary | ICD-10-CM

## 2024-02-19 DIAGNOSIS — R53.83 FATIGUE, UNSPECIFIED TYPE: ICD-10-CM

## 2024-02-19 PROCEDURE — 99215 OFFICE O/P EST HI 40 MIN: CPT | Performed by: NURSE PRACTITIONER

## 2024-02-19 PROCEDURE — 96372 THER/PROPH/DIAG INJ SC/IM: CPT | Performed by: NURSE PRACTITIONER

## 2024-02-19 RX ORDER — SEMAGLUTIDE 2.4 MG/.75ML
2.4 INJECTION, SOLUTION SUBCUTANEOUS WEEKLY
Qty: 3 ML | Refills: 0 | Status: CANCELLED | OUTPATIENT
Start: 2024-02-19 | End: 2024-03-20

## 2024-02-19 RX ORDER — SEMAGLUTIDE 1.7 MG/.75ML
1.7 INJECTION, SOLUTION SUBCUTANEOUS WEEKLY
Qty: 3 ML | Refills: 1 | Status: SHIPPED | OUTPATIENT
Start: 2024-02-19 | End: 2024-03-20

## 2024-02-19 RX ORDER — CYANOCOBALAMIN 1000 UG/ML
1000 INJECTION, SOLUTION INTRAMUSCULAR; SUBCUTANEOUS
Status: SHIPPED | OUTPATIENT
Start: 2024-02-19

## 2024-02-19 RX ADMIN — CYANOCOBALAMIN 1000 MCG: 1000 INJECTION, SOLUTION INTRAMUSCULAR; SUBCUTANEOUS at 09:41

## 2024-02-19 NOTE — ASSESSMENT & PLAN NOTE
Patient's (Body mass index is 34.73 kg/m².) indicates that they are obese (BMI >30) with health conditions that include hypertension and dyslipidemias . Weight is improving with treatment. BMI  is above average; BMI management plan is completed. We discussed low calorie, low carb based diet program, portion control, increasing exercise, pharmacologic options including Wegovy, and an joe-based approach such as [employer] Pal or Lose It.   --This is a follow up visit and she is down around a half of pounds  -- Patient is currently taking Wegovy 1.7 mg.  A1c was done at last visit and mildly low.  We did discuss not increasing related to that information at this visit.  We will repeat the A1c in a couple months and adjust therapy if needed.  We did discuss not necessarily restricting carbohydrates significantly below.  Did discuss that the quality of the carbohydrates make a big difference focusing on plant-based sources and whole grains.  --Currently journaling around 10 days.  Goal to journal no less than 20 out of 30 days this coming month and asked her to bring in for brief review at next office visit  --We did set a goal of exercise last month and she is trying to reach these goals.  She has a walking goal 30 minutes a day except for herself working to keep that this coming month.  --Is a sign that she does have a hysterectomy scheduled for this coming month before being seen by us again.  --Keep at wegovy 1.7 related to low A1C. Repeat A1C in 2 months.

## 2024-02-27 ENCOUNTER — TELEPHONE (OUTPATIENT)
Dept: OBSTETRICS AND GYNECOLOGY | Facility: CLINIC | Age: 39
End: 2024-02-27
Payer: OTHER GOVERNMENT

## 2024-03-06 ENCOUNTER — PRE-ADMISSION TESTING (OUTPATIENT)
Dept: PREADMISSION TESTING | Facility: HOSPITAL | Age: 39
End: 2024-03-06
Payer: OTHER GOVERNMENT

## 2024-03-06 ENCOUNTER — OFFICE VISIT (OUTPATIENT)
Dept: OBSTETRICS AND GYNECOLOGY | Facility: CLINIC | Age: 39
End: 2024-03-06
Payer: OTHER GOVERNMENT

## 2024-03-06 ENCOUNTER — PREP FOR SURGERY (OUTPATIENT)
Dept: OTHER | Facility: HOSPITAL | Age: 39
End: 2024-03-06
Payer: OTHER GOVERNMENT

## 2024-03-06 VITALS
WEIGHT: 233.2 LBS | DIASTOLIC BLOOD PRESSURE: 86 MMHG | BODY MASS INDEX: 35.34 KG/M2 | HEIGHT: 68 IN | SYSTOLIC BLOOD PRESSURE: 146 MMHG

## 2024-03-06 VITALS — WEIGHT: 233.36 LBS | BODY MASS INDEX: 35.37 KG/M2 | HEIGHT: 68 IN

## 2024-03-06 DIAGNOSIS — N93.9 ABNORMAL UTERINE BLEEDING (AUB): ICD-10-CM

## 2024-03-06 DIAGNOSIS — N94.6 DYSMENORRHEA: Primary | ICD-10-CM

## 2024-03-06 DIAGNOSIS — N93.9 ABNORMAL UTERINE BLEEDING (AUB): Primary | ICD-10-CM

## 2024-03-06 DIAGNOSIS — Z86.718 PERSONAL HISTORY OF DVT (DEEP VEIN THROMBOSIS): ICD-10-CM

## 2024-03-06 LAB
ABO GROUP BLD: NORMAL
ANION GAP SERPL CALCULATED.3IONS-SCNC: 11 MMOL/L (ref 5–15)
BASOPHILS # BLD AUTO: 0.02 10*3/MM3 (ref 0–0.2)
BASOPHILS NFR BLD AUTO: 0.4 % (ref 0–1.5)
BLD GP AB SCN SERPL QL: NEGATIVE
BUN SERPL-MCNC: 11 MG/DL (ref 6–20)
BUN/CREAT SERPL: 15.9 (ref 7–25)
CALCIUM SPEC-SCNC: 8.6 MG/DL (ref 8.6–10.5)
CHLORIDE SERPL-SCNC: 103 MMOL/L (ref 98–107)
CO2 SERPL-SCNC: 27 MMOL/L (ref 22–29)
CREAT SERPL-MCNC: 0.69 MG/DL (ref 0.57–1)
DEPRECATED RDW RBC AUTO: 38.5 FL (ref 37–54)
EGFRCR SERPLBLD CKD-EPI 2021: 115.5 ML/MIN/1.73
EOSINOPHIL # BLD AUTO: 0 10*3/MM3 (ref 0–0.4)
EOSINOPHIL NFR BLD AUTO: 0 % (ref 0.3–6.2)
ERYTHROCYTE [DISTWIDTH] IN BLOOD BY AUTOMATED COUNT: 12.1 % (ref 12.3–15.4)
GLUCOSE SERPL-MCNC: 105 MG/DL (ref 65–99)
HBA1C MFR BLD: 4.3 % (ref 4.8–5.6)
HCT VFR BLD AUTO: 41 % (ref 34–46.6)
HGB BLD-MCNC: 14.5 G/DL (ref 12–15.9)
IMM GRANULOCYTES # BLD AUTO: 0.03 10*3/MM3 (ref 0–0.05)
IMM GRANULOCYTES NFR BLD AUTO: 0.5 % (ref 0–0.5)
LYMPHOCYTES # BLD AUTO: 0.76 10*3/MM3 (ref 0.7–3.1)
LYMPHOCYTES NFR BLD AUTO: 13.3 % (ref 19.6–45.3)
MCH RBC QN AUTO: 30.6 PG (ref 26.6–33)
MCHC RBC AUTO-ENTMCNC: 35.4 G/DL (ref 31.5–35.7)
MCV RBC AUTO: 86.5 FL (ref 79–97)
MONOCYTES # BLD AUTO: 0.46 10*3/MM3 (ref 0.1–0.9)
MONOCYTES NFR BLD AUTO: 8.1 % (ref 5–12)
NEUTROPHILS NFR BLD AUTO: 4.44 10*3/MM3 (ref 1.7–7)
NEUTROPHILS NFR BLD AUTO: 77.7 % (ref 42.7–76)
NRBC BLD AUTO-RTO: 0 /100 WBC (ref 0–0.2)
PLATELET # BLD AUTO: 151 10*3/MM3 (ref 140–450)
PMV BLD AUTO: 9 FL (ref 6–12)
POTASSIUM SERPL-SCNC: 3.6 MMOL/L (ref 3.5–5.2)
RBC # BLD AUTO: 4.74 10*6/MM3 (ref 3.77–5.28)
RH BLD: POSITIVE
SODIUM SERPL-SCNC: 141 MMOL/L (ref 136–145)
T&S EXPIRATION DATE: NORMAL
WBC NRBC COR # BLD AUTO: 5.71 10*3/MM3 (ref 3.4–10.8)

## 2024-03-06 PROCEDURE — 83036 HEMOGLOBIN GLYCOSYLATED A1C: CPT

## 2024-03-06 PROCEDURE — 86900 BLOOD TYPING SEROLOGIC ABO: CPT

## 2024-03-06 PROCEDURE — 85025 COMPLETE CBC W/AUTO DIFF WBC: CPT

## 2024-03-06 PROCEDURE — 36415 COLL VENOUS BLD VENIPUNCTURE: CPT

## 2024-03-06 PROCEDURE — 80048 BASIC METABOLIC PNL TOTAL CA: CPT

## 2024-03-06 PROCEDURE — 86901 BLOOD TYPING SEROLOGIC RH(D): CPT

## 2024-03-06 PROCEDURE — 86850 RBC ANTIBODY SCREEN: CPT

## 2024-03-06 RX ORDER — ACETAMINOPHEN 500 MG
TABLET ORAL
Qty: 80 TABLET | Refills: 0 | Status: SHIPPED | OUTPATIENT
Start: 2024-03-06

## 2024-03-06 RX ORDER — PHENAZOPYRIDINE HYDROCHLORIDE 100 MG/1
200 TABLET, FILM COATED ORAL ONCE
Status: CANCELLED | OUTPATIENT
Start: 2024-03-06 | End: 2024-03-06

## 2024-03-06 RX ORDER — SODIUM CHLORIDE 0.9 % (FLUSH) 0.9 %
3 SYRINGE (ML) INJECTION EVERY 12 HOURS SCHEDULED
Status: CANCELLED | OUTPATIENT
Start: 2024-03-06

## 2024-03-06 RX ORDER — SODIUM CHLORIDE 0.9 % (FLUSH) 0.9 %
10 SYRINGE (ML) INJECTION AS NEEDED
Status: CANCELLED | OUTPATIENT
Start: 2024-03-06

## 2024-03-06 RX ORDER — HEPARIN SODIUM 5000 [USP'U]/ML
5000 INJECTION, SOLUTION INTRAVENOUS; SUBCUTANEOUS ONCE
Status: CANCELLED | OUTPATIENT
Start: 2024-03-06

## 2024-03-06 RX ORDER — ACETAMINOPHEN 500 MG
1000 TABLET ORAL ONCE
Status: CANCELLED | OUTPATIENT
Start: 2024-03-06 | End: 2024-03-06

## 2024-03-06 RX ORDER — METRONIDAZOLE 500 MG/100ML
500 INJECTION, SOLUTION INTRAVENOUS
Status: CANCELLED | OUTPATIENT
Start: 2024-03-07 | End: 2024-03-08

## 2024-03-06 RX ORDER — SCOLOPAMINE TRANSDERMAL SYSTEM 1 MG/1
1 PATCH, EXTENDED RELEASE TRANSDERMAL CONTINUOUS
Status: CANCELLED | OUTPATIENT
Start: 2024-03-06 | End: 2024-03-09

## 2024-03-06 RX ORDER — POLYETHYLENE GLYCOL 3350 17 G/17G
17 POWDER, FOR SOLUTION ORAL DAILY
Qty: 116 G | Refills: 0 | Status: SHIPPED | OUTPATIENT
Start: 2024-03-06 | End: 2024-03-13

## 2024-03-06 RX ORDER — ONDANSETRON 4 MG/1
4 TABLET, ORALLY DISINTEGRATING ORAL EVERY 8 HOURS PRN
Qty: 10 TABLET | Refills: 0 | Status: SHIPPED | OUTPATIENT
Start: 2024-03-06

## 2024-03-06 RX ORDER — OXYCODONE HYDROCHLORIDE 5 MG/1
5 TABLET ORAL EVERY 6 HOURS PRN
Qty: 3 TABLET | Refills: 0 | Status: SHIPPED | OUTPATIENT
Start: 2024-03-06

## 2024-03-06 RX ORDER — IBUPROFEN 600 MG/1
TABLET ORAL
Qty: 40 TABLET | Refills: 0 | Status: SHIPPED | OUTPATIENT
Start: 2024-03-06

## 2024-03-06 RX ORDER — GABAPENTIN 300 MG/1
600 CAPSULE ORAL ONCE
Status: CANCELLED | OUTPATIENT
Start: 2024-03-06 | End: 2024-03-06

## 2024-03-06 RX ORDER — SODIUM CHLORIDE 9 MG/ML
40 INJECTION, SOLUTION INTRAVENOUS AS NEEDED
Status: CANCELLED | OUTPATIENT
Start: 2024-03-06
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
## 2024-03-06 NOTE — PAT
An arrival time for procedure was not provided during PAT visit. If patient had any questions or concerns about their arrival time, they were instructed to contact their surgeon/physician.  Additionally, if the patient referred to an arrival time that was acquired from their my chart account, patient was encouraged to verify that time with their surgeon/physician. Arrival times are NOT provided in Pre Admission Testing Department.    Blood bank bracelet applied to patient during Pre Admission Testing visit.  Patient instructed not to remove from arm until after procedure and they are discharged from the hospital.  Explained to patient that they may shower and get the bracelet wet, but not to immerse under water for longer periods (bathing, swimming, hand dishwashing, etc).  Patient verbalized understanding.    Patient instructed to drink 20 ounces of Gatorade or Gatorlyte (if diabetic) and it needs to be completed 1 hour (for Main OR patients) or 2 hours (scheduled  section & [facility] patients) before given arrival time for procedure (NO RED Gatorade and NO Gatorade Zero).    Patient verbalized understanding.    It was noted during Pre Admission Testing that patient was wearing some form of fingernail polish (gel/regular) and/or acrylic/artificial nails.  Patient was told that polish and/or artificial nails must be removed for surgery.  If a patient had recent manicure, and would rather not remove polish or artificial nails. Then the minimum requirement is that the polish/artificial nails must be removed from the middle finger on each hand.  Patient verbalized understanding.    If patient was having surgery on an upper extremity, then the patient was instructed that fingernail polish/artificial fingernails must be removed for surgery.  NO EXCEPTIONS.  Patient verbalized understanding.    If patient was having surgery on a lower extremity, then the patient was instructed that toenail polish on both  extremities must be removed for surgery.  NO EXCEPTIONS. Patient verbalized understanding.    Patient instructed to bring CPAP mask and tubing to the hospital for overnight stay.  Explained that it is not necessary to bring their CPAP machine to the hospital instead a CPAP machine will be provided for use by the hospital. If patient knows their CPAP settings, those settings will be implemented.  If not, the CPAP machine will be utilized on the auto setting using their mask and tubing.    Patient verbalized understanding.    Post-Surgery Information Instruction Sheet given to patient during Pre-Admission Testing Visit with verbal instructions to patient to return with PAT PASS on the day of surgery. Additionally, encouraged patient to review the information provided.    Patient denies any current skin issues.     Instructed patient to take two Tylenol extra strength (total of 1000 mg) the night before surgery.    Patient to apply Chlorhexadine wipes  to surgical area (as instructed) the night before procedure and the AM of procedure. Wipes provided.    Patient viewed general PAT education video as instructed in their preoperative information received from their surgeon.  Patient stated the general PAT education video was viewed in its entirety and survey completed.  Copies of [facility] general education handouts (Incentive Spirometry, Meds to Beds Program, Patient Belongings, Pre-op skin preparation instructions, Blood Glucose testing, Visitor policy, Surgery FAQ, Code H) distributed to patient if not printed. Education related to the PAT pass and skin preparation for surgery (if applicable) completed in [facility] as a reinforcement to PAT education video. Patient instructed to return PAT pass provided today as well as completed skin preparation sheet (if applicable) on the day of procedure.     Additionally if patient had not viewed video yet but intended to view it at home or in our waiting area, then referred them to the  handout with QR code/link provided during PAT visit.  Instructed patient to complete survey after viewing the video in its entirety.  Encouraged patient/family to read PAT general education handouts thoroughly and notify PAT staff with any questions or concerns. Patient verbalized understanding of all information and priority content.    PATIENT EKG AND CARDIAC RISK ASSESSMENT ON CHART AND IN EPIC

## 2024-03-07 ENCOUNTER — ANESTHESIA EVENT (OUTPATIENT)
Dept: PERIOP | Facility: HOSPITAL | Age: 39
End: 2024-03-07
Payer: OTHER GOVERNMENT

## 2024-03-07 RX ORDER — FAMOTIDINE 10 MG/ML
20 INJECTION, SOLUTION INTRAVENOUS ONCE
Status: CANCELLED | OUTPATIENT
Start: 2024-03-07 | End: 2024-03-07

## 2024-03-08 ENCOUNTER — ANESTHESIA (OUTPATIENT)
Dept: PERIOP | Facility: HOSPITAL | Age: 39
End: 2024-03-08
Payer: OTHER GOVERNMENT

## 2024-03-08 ENCOUNTER — HOSPITAL ENCOUNTER (OUTPATIENT)
Facility: HOSPITAL | Age: 39
Setting detail: HOSPITAL OUTPATIENT SURGERY
Discharge: HOME | End: 2024-03-08
Attending: OBSTETRICS & GYNECOLOGY | Admitting: OBSTETRICS & GYNECOLOGY
Payer: OTHER GOVERNMENT

## 2024-03-08 VITALS
SYSTOLIC BLOOD PRESSURE: 135 MMHG | HEART RATE: 86 BPM | TEMPERATURE: 98.8 F | DIASTOLIC BLOOD PRESSURE: 83 MMHG | HEIGHT: 68 IN | RESPIRATION RATE: 16 BRPM | OXYGEN SATURATION: 95 % | BODY MASS INDEX: 35.31 KG/M2 | WEIGHT: 233 LBS

## 2024-03-08 DIAGNOSIS — N93.9 ABNORMAL UTERINE BLEEDING (AUB): ICD-10-CM

## 2024-03-08 DIAGNOSIS — Z86.718 PERSONAL HISTORY OF DVT (DEEP VEIN THROMBOSIS): ICD-10-CM

## 2024-03-08 PROCEDURE — 25010000002 HEPARIN (PORCINE) PER 1000 UNITS: Performed by: OBSTETRICS & GYNECOLOGY

## 2024-03-08 PROCEDURE — 25010000002 ONDANSETRON PER 1 MG: Performed by: NURSE ANESTHETIST, CERTIFIED REGISTERED

## 2024-03-08 PROCEDURE — 25010000002 HYDROMORPHONE 1 MG/ML SOLUTION

## 2024-03-08 PROCEDURE — 25010000002 KETOROLAC TROMETHAMINE PER 15 MG

## 2024-03-08 PROCEDURE — 25010000002 MIDAZOLAM PER 1 MG: Performed by: ANESTHESIOLOGY

## 2024-03-08 PROCEDURE — 25010000002 DEXAMETHASONE SODIUM PHOSPHATE 10 MG/ML SOLUTION: Performed by: NURSE ANESTHETIST, CERTIFIED REGISTERED

## 2024-03-08 PROCEDURE — 25010000002 ESMOLOL 100 MG/10ML SOLUTION: Performed by: NURSE ANESTHETIST, CERTIFIED REGISTERED

## 2024-03-08 PROCEDURE — 25010000002 CEFAZOLIN PER 500 MG: Performed by: OBSTETRICS & GYNECOLOGY

## 2024-03-08 PROCEDURE — 25010000002 PROPOFOL 10 MG/ML EMULSION: Performed by: ANESTHESIOLOGY

## 2024-03-08 PROCEDURE — 58571 TLH W/T/O 250 G OR LESS: CPT | Performed by: OBSTETRICS & GYNECOLOGY

## 2024-03-08 PROCEDURE — 25010000002 SUGAMMADEX 200 MG/2ML SOLUTION: Performed by: NURSE ANESTHETIST, CERTIFIED REGISTERED

## 2024-03-08 PROCEDURE — 88307 TISSUE EXAM BY PATHOLOGIST: CPT | Performed by: OBSTETRICS & GYNECOLOGY

## 2024-03-08 PROCEDURE — 25010000002 FENTANYL CITRATE (PF) 50 MCG/ML SOLUTION

## 2024-03-08 PROCEDURE — 81025 URINE PREGNANCY TEST: CPT | Performed by: ANESTHESIOLOGY

## 2024-03-08 PROCEDURE — 25810000003 LACTATED RINGERS PER 1000 ML: Performed by: ANESTHESIOLOGY

## 2024-03-08 PROCEDURE — 25010000002 FENTANYL CITRATE (PF) 100 MCG/2ML SOLUTION: Performed by: ANESTHESIOLOGY

## 2024-03-08 PROCEDURE — 25010000002 PROPOFOL 10 MG/ML EMULSION: Performed by: NURSE ANESTHETIST, CERTIFIED REGISTERED

## 2024-03-08 DEVICE — DEV WND/CLS CONTRL TISS STRATAFIX SPIRAL PLS PDS CT1 0 22CM: Type: IMPLANTABLE DEVICE | Site: ABDOMEN | Status: FUNCTIONAL

## 2024-03-08 RX ORDER — MIDAZOLAM HYDROCHLORIDE 1 MG/ML
2 INJECTION INTRAMUSCULAR; INTRAVENOUS ONCE
Status: COMPLETED | OUTPATIENT
Start: 2024-03-08 | End: 2024-03-08

## 2024-03-08 RX ORDER — FENTANYL CITRATE 50 UG/ML
INJECTION, SOLUTION INTRAMUSCULAR; INTRAVENOUS AS NEEDED
Status: DISCONTINUED | OUTPATIENT
Start: 2024-03-08 | End: 2024-03-08 | Stop reason: SURG

## 2024-03-08 RX ORDER — ROCURONIUM BROMIDE 10 MG/ML
INJECTION, SOLUTION INTRAVENOUS AS NEEDED
Status: DISCONTINUED | OUTPATIENT
Start: 2024-03-08 | End: 2024-03-08 | Stop reason: SURG

## 2024-03-08 RX ORDER — SODIUM CHLORIDE 9 MG/ML
40 INJECTION, SOLUTION INTRAVENOUS AS NEEDED
Status: DISCONTINUED | OUTPATIENT
Start: 2024-03-08 | End: 2024-03-08 | Stop reason: HOSPADM

## 2024-03-08 RX ORDER — MIDAZOLAM HYDROCHLORIDE 1 MG/ML
1 INJECTION INTRAMUSCULAR; INTRAVENOUS
Status: DISCONTINUED | OUTPATIENT
Start: 2024-03-08 | End: 2024-03-08 | Stop reason: HOSPADM

## 2024-03-08 RX ORDER — LIDOCAINE HYDROCHLORIDE 10 MG/ML
INJECTION, SOLUTION EPIDURAL; INFILTRATION; INTRACAUDAL; PERINEURAL AS NEEDED
Status: DISCONTINUED | OUTPATIENT
Start: 2024-03-08 | End: 2024-03-08 | Stop reason: SURG

## 2024-03-08 RX ORDER — LIDOCAINE HYDROCHLORIDE 10 MG/ML
0.5 INJECTION, SOLUTION EPIDURAL; INFILTRATION; INTRACAUDAL; PERINEURAL ONCE AS NEEDED
Status: DISCONTINUED | OUTPATIENT
Start: 2024-03-08 | End: 2024-03-08 | Stop reason: HOSPADM

## 2024-03-08 RX ORDER — ESMOLOL HYDROCHLORIDE 10 MG/ML
INJECTION INTRAVENOUS AS NEEDED
Status: DISCONTINUED | OUTPATIENT
Start: 2024-03-08 | End: 2024-03-08 | Stop reason: SURG

## 2024-03-08 RX ORDER — ACETAMINOPHEN 500 MG
1000 TABLET ORAL ONCE
Status: COMPLETED | OUTPATIENT
Start: 2024-03-08 | End: 2024-03-08

## 2024-03-08 RX ORDER — FENTANYL CITRATE 50 UG/ML
INJECTION, SOLUTION INTRAMUSCULAR; INTRAVENOUS
Status: COMPLETED
Start: 2024-03-08 | End: 2024-03-08

## 2024-03-08 RX ORDER — SODIUM CHLORIDE 0.9 % (FLUSH) 0.9 %
3 SYRINGE (ML) INJECTION EVERY 12 HOURS SCHEDULED
Status: DISCONTINUED | OUTPATIENT
Start: 2024-03-08 | End: 2024-03-08 | Stop reason: HOSPADM

## 2024-03-08 RX ORDER — SCOLOPAMINE TRANSDERMAL SYSTEM 1 MG/1
1 PATCH, EXTENDED RELEASE TRANSDERMAL CONTINUOUS
Status: DISCONTINUED | OUTPATIENT
Start: 2024-03-08 | End: 2024-03-08 | Stop reason: HOSPADM

## 2024-03-08 RX ORDER — MAGNESIUM HYDROXIDE 1200 MG/15ML
LIQUID ORAL AS NEEDED
Status: DISCONTINUED | OUTPATIENT
Start: 2024-03-08 | End: 2024-03-08 | Stop reason: HOSPADM

## 2024-03-08 RX ORDER — SODIUM CHLORIDE 0.9 % (FLUSH) 0.9 %
10 SYRINGE (ML) INJECTION EVERY 12 HOURS SCHEDULED
Status: DISCONTINUED | OUTPATIENT
Start: 2024-03-08 | End: 2024-03-08 | Stop reason: HOSPADM

## 2024-03-08 RX ORDER — HEPARIN SODIUM 5000 [USP'U]/ML
5000 INJECTION, SOLUTION INTRAVENOUS; SUBCUTANEOUS ONCE
Status: COMPLETED | OUTPATIENT
Start: 2024-03-08 | End: 2024-03-08

## 2024-03-08 RX ORDER — GABAPENTIN 300 MG/1
600 CAPSULE ORAL ONCE
Status: COMPLETED | OUTPATIENT
Start: 2024-03-08 | End: 2024-03-08

## 2024-03-08 RX ORDER — SODIUM CHLORIDE, SODIUM LACTATE, POTASSIUM CHLORIDE, CALCIUM CHLORIDE 600; 310; 30; 20 MG/100ML; MG/100ML; MG/100ML; MG/100ML
INJECTION, SOLUTION INTRAVENOUS CONTINUOUS PRN
Status: DISCONTINUED | OUTPATIENT
Start: 2024-03-08 | End: 2024-03-08 | Stop reason: SURG

## 2024-03-08 RX ORDER — KETOROLAC TROMETHAMINE 30 MG/ML
30 INJECTION, SOLUTION INTRAMUSCULAR; INTRAVENOUS ONCE
Status: COMPLETED | OUTPATIENT
Start: 2024-03-08 | End: 2024-03-08

## 2024-03-08 RX ORDER — HYDROMORPHONE HYDROCHLORIDE 1 MG/ML
0.5 INJECTION, SOLUTION INTRAMUSCULAR; INTRAVENOUS; SUBCUTANEOUS
Status: DISCONTINUED | OUTPATIENT
Start: 2024-03-08 | End: 2024-03-08 | Stop reason: HOSPADM

## 2024-03-08 RX ORDER — PHENAZOPYRIDINE HYDROCHLORIDE 100 MG/1
200 TABLET, FILM COATED ORAL ONCE
Status: COMPLETED | OUTPATIENT
Start: 2024-03-08 | End: 2024-03-08

## 2024-03-08 RX ORDER — FAMOTIDINE 20 MG/1
20 TABLET, FILM COATED ORAL ONCE
Status: COMPLETED | OUTPATIENT
Start: 2024-03-08 | End: 2024-03-08

## 2024-03-08 RX ORDER — FENTANYL CITRATE 50 UG/ML
50 INJECTION, SOLUTION INTRAMUSCULAR; INTRAVENOUS
Status: DISCONTINUED | OUTPATIENT
Start: 2024-03-08 | End: 2024-03-08 | Stop reason: HOSPADM

## 2024-03-08 RX ORDER — METRONIDAZOLE 500 MG/100ML
500 INJECTION, SOLUTION INTRAVENOUS
Status: DISCONTINUED | OUTPATIENT
Start: 2024-03-09 | End: 2024-03-08 | Stop reason: HOSPADM

## 2024-03-08 RX ORDER — DEXAMETHASONE SODIUM PHOSPHATE 10 MG/ML
INJECTION, SOLUTION INTRAMUSCULAR; INTRAVENOUS AS NEEDED
Status: DISCONTINUED | OUTPATIENT
Start: 2024-03-08 | End: 2024-03-08 | Stop reason: SURG

## 2024-03-08 RX ORDER — SODIUM CHLORIDE 0.9 % (FLUSH) 0.9 %
10 SYRINGE (ML) INJECTION AS NEEDED
Status: DISCONTINUED | OUTPATIENT
Start: 2024-03-08 | End: 2024-03-08 | Stop reason: HOSPADM

## 2024-03-08 RX ORDER — ONDANSETRON 2 MG/ML
INJECTION INTRAMUSCULAR; INTRAVENOUS AS NEEDED
Status: DISCONTINUED | OUTPATIENT
Start: 2024-03-08 | End: 2024-03-08 | Stop reason: SURG

## 2024-03-08 RX ORDER — KETOROLAC TROMETHAMINE 30 MG/ML
INJECTION, SOLUTION INTRAMUSCULAR; INTRAVENOUS
Status: COMPLETED
Start: 2024-03-08 | End: 2024-03-08

## 2024-03-08 RX ORDER — PROPOFOL 10 MG/ML
VIAL (ML) INTRAVENOUS AS NEEDED
Status: DISCONTINUED | OUTPATIENT
Start: 2024-03-08 | End: 2024-03-08 | Stop reason: SURG

## 2024-03-08 RX ORDER — SODIUM CHLORIDE, SODIUM LACTATE, POTASSIUM CHLORIDE, CALCIUM CHLORIDE 600; 310; 30; 20 MG/100ML; MG/100ML; MG/100ML; MG/100ML
9 INJECTION, SOLUTION INTRAVENOUS CONTINUOUS
Status: DISCONTINUED | OUTPATIENT
Start: 2024-03-08 | End: 2024-03-08 | Stop reason: HOSPADM

## 2024-03-08 RX ORDER — BUPIVACAINE HYDROCHLORIDE AND EPINEPHRINE 2.5; 5 MG/ML; UG/ML
INJECTION, SOLUTION INFILTRATION; PERINEURAL AS NEEDED
Status: DISCONTINUED | OUTPATIENT
Start: 2024-03-08 | End: 2024-03-08 | Stop reason: HOSPADM

## 2024-03-08 RX ADMIN — ONDANSETRON 4 MG: 2 INJECTION INTRAMUSCULAR; INTRAVENOUS at 14:28

## 2024-03-08 RX ADMIN — FAMOTIDINE 20 MG: 20 TABLET, FILM COATED ORAL at 11:29

## 2024-03-08 RX ADMIN — SUGAMMADEX 250 MG: 100 INJECTION, SOLUTION INTRAVENOUS at 14:51

## 2024-03-08 RX ADMIN — HYDROMORPHONE HYDROCHLORIDE 0.5 MG: 1 INJECTION, SOLUTION INTRAMUSCULAR; INTRAVENOUS; SUBCUTANEOUS at 16:30

## 2024-03-08 RX ADMIN — ROCURONIUM BROMIDE 100 MG: 10 SOLUTION INTRAVENOUS at 12:54

## 2024-03-08 RX ADMIN — MIDAZOLAM HYDROCHLORIDE 2 MG: 1 INJECTION, SOLUTION INTRAMUSCULAR; INTRAVENOUS at 11:32

## 2024-03-08 RX ADMIN — FENTANYL CITRATE 50 MCG: 50 INJECTION, SOLUTION INTRAMUSCULAR; INTRAVENOUS at 15:30

## 2024-03-08 RX ADMIN — PROPOFOL 150 MG: 10 INJECTION, EMULSION INTRAVENOUS at 12:54

## 2024-03-08 RX ADMIN — HYDROMORPHONE HYDROCHLORIDE 0.5 MG: 1 INJECTION, SOLUTION INTRAMUSCULAR; INTRAVENOUS; SUBCUTANEOUS at 15:35

## 2024-03-08 RX ADMIN — FENTANYL CITRATE 50 MCG: 50 INJECTION, SOLUTION INTRAMUSCULAR; INTRAVENOUS at 16:15

## 2024-03-08 RX ADMIN — FENTANYL CITRATE 50 MCG: 50 INJECTION, SOLUTION INTRAMUSCULAR; INTRAVENOUS at 15:45

## 2024-03-08 RX ADMIN — DEXAMETHASONE SODIUM PHOSPHATE 10 MG: 10 INJECTION, SOLUTION INTRAMUSCULAR; INTRAVENOUS at 13:10

## 2024-03-08 RX ADMIN — ACETAMINOPHEN 1000 MG: 500 TABLET ORAL at 11:29

## 2024-03-08 RX ADMIN — ESMOLOL HYDROCHLORIDE 70 MG: 10 INJECTION, SOLUTION INTRAVENOUS at 13:02

## 2024-03-08 RX ADMIN — ROCURONIUM BROMIDE 50 MG: 10 SOLUTION INTRAVENOUS at 14:04

## 2024-03-08 RX ADMIN — FENTANYL CITRATE 100 MCG: 50 INJECTION, SOLUTION INTRAMUSCULAR; INTRAVENOUS at 12:54

## 2024-03-08 RX ADMIN — KETOROLAC TROMETHAMINE 30 MG: 30 INJECTION, SOLUTION INTRAMUSCULAR; INTRAVENOUS at 17:32

## 2024-03-08 RX ADMIN — GABAPENTIN 600 MG: 300 CAPSULE ORAL at 11:29

## 2024-03-08 RX ADMIN — SCOPOLAMINE 1 PATCH: 1.5 PATCH, EXTENDED RELEASE TRANSDERMAL at 11:29

## 2024-03-08 RX ADMIN — SODIUM CHLORIDE 2000 MG: 900 INJECTION INTRAVENOUS at 12:54

## 2024-03-08 RX ADMIN — SODIUM CHLORIDE, POTASSIUM CHLORIDE, SODIUM LACTATE AND CALCIUM CHLORIDE: 600; 310; 30; 20 INJECTION, SOLUTION INTRAVENOUS at 12:50

## 2024-03-08 RX ADMIN — HEPARIN SODIUM 5000 UNITS: 5000 INJECTION INTRAVENOUS; SUBCUTANEOUS at 11:29

## 2024-03-08 RX ADMIN — PROPOFOL 25 MCG/KG/MIN: 10 INJECTION, EMULSION INTRAVENOUS at 12:59

## 2024-03-08 RX ADMIN — LIDOCAINE HYDROCHLORIDE 100 MG: 10 INJECTION, SOLUTION EPIDURAL; INFILTRATION; INTRACAUDAL; PERINEURAL at 12:54

## 2024-03-08 RX ADMIN — PHENAZOPYRIDINE 200 MG: 100 TABLET ORAL at 11:29

## 2024-03-08 NOTE — DISCHARGE INSTRUCTIONS
[facility]  Laparoscopic Surgery  Discharge Instructions    Rodolfo Hauser MD  [address], [address]  [place] [address]  Phone [phone]    What to Expect  Although your surgery was done laparoscopically, it is still surgery and may take some time to recover. Please resume activities slowly.     It is normal for one incision to be slighter larger and sometimes more painful than the rest.     Most patient will have some vaginal spotting intermittently. This may not begin right away, and may vary in amount from day-to-day. As long as it is not heavy enough to saturate a pad every hour, it is normal. Use a panty liner, not a tampon.     Most patients do not need prescription pain medication at home after laparoscopic surgery. To minimize pain, for the first 2 days after your discharge, continue to take acetaminophen (Tylenol) and ibuprofen (Motrin or Advil) every 6 hours as directed on your discharge medication list. This is 3 tablets of regular strength acetaminophen, which is tylenol (325 mg each), and 3 tablets of regular strength ibuprofen, which is motrin or advil (200 mg each). You may have been sent home with prescription strength Ibuprofen if your insurance allows. After the 2 days are up, you can continue to take these medications every 6 hours as needed. You may also put an ice pack over the incision if you would like.     Try to drink plenty of fluids. We recommend taking one capful of Miralax daily and a stool softener (senakot) until you're having normal soft bowel movements.      Activity    Do not lift more than 25 lbs for 2 weeks from the time of surgery.     Climbing stairs is perfectly ok. If you feel unsteady, hold onto a wall or railing for balance. Gradually increase your physical activity as tolerated. If you're feeling well at 2 weeks, you may resume light exercises such as jogging or elliptical machines. Avoid exercises that require a lot of core work,  such as pilates, abdominal crunches, cross-fit, for 4 weeks.     If you have had a hysterectomy performed no tampons, douching or sexual intercourse until you're examined and given the approval from the doctor (usually around 6-8 weeks from surgery).     You may resume driving when you are pain-free enough to react quickly with your braking foot. For most patients this occurs at 1 week following surgery.     You may shower normally. The incisions usually have dissolvable sutures and/or glue at the incision sites. Please keep clean and dry. Just pat the area dry after a shower. Skin glue should be peeled off in one week, remove it just as you would a band-aid. A small amount of redness at the skin edge is normal and a small amount of bloody or clear discharge can be expected.      PLEASE CALL THE OFFICE -[phone] IF YOU EXPERIENCE ANY OF THE FOLLOWING:   Heavy bleeding (using one pad an hour)   Fever greater than 101.0   Foul smelling vaginal discharge   Worsening pain or pain not relieved by pain medications.   Constipation not relieved by laxatives.   Persistent nausea or vomiting or any other concerns.

## 2024-03-08 NOTE — OP NOTE
Robot Assited Total Laparoscopic Hysterectomy, Bilateral Salpingectomy, Cystoscopy Procedure Note     Patient Name: Dianna Caicedo   : 1985   MRN: [id number]   Place of Service: [facility]  Date of Service: 3/8/2024     Pre-op Diagnosis: Abnormal uterine bleeding (AUB) [N93.9]      Post-op Diagnosis: Abnormal uterine bleeding (AUB) [N93.9]      Procedure: Robot Assisted Total Laparoscopic Hysterectomy, Bilateral Salpingectomy(Uterus <250g) - CPT 22766    Surgeon: Rodolfo Hauser MD     Assistant: Camelia Dixon MD was responsible for performing the following activities: Retraction, Suction, Irrigation, Suturing, and Closing and their skilled assistance was necessary for the success of this case.       Anesthesia:  Anesthesiologist: Reese Perry MD  CRNA: Geronimo Murrell CRNA     Staff:  Circulator: Steff Benjamin RN  Scrub Person: Alpa Holliday; Jeniffer Everett     Findings: Uterus: normal  Fallopian Tubes: Normal right tube and Left tube adherent to bladder  Ovaries: Normal Bilaterally  Adhesions: Present between Left fallopian tube, anterior uterus, and bladder    Estimated Blood Loss: 25 ml    Drains: none     Specimens:   Specimens       ID Source Type Tests Collected By Collected At Frozen?    A Uterus, Cervix, Bilateral Fallopian Tubes  Tissue TISSUE PATHOLOGY EXAM   Rodolfo Hauser MD 3/8/24 5138     Description: 0.148 kg    This specimen was not marked as sent.             Complications: none     Disposition: stable     The patient was taken to the OR and anesthetized. Her arms were carefully padded and tucked, and legs were placed in lithotomy with Dharmesh stirrups. She was prepped and draped in normal sterile fashion. A time-out was performed. Pre-incision antibiotics were administered. A Blackwell was placed. A speculum was placed into the vagina. The cervix was dilated and a V-care uterine manipulator was placed. All other instruments were removed. Gloves were changed.      The umbilicus was everted and a Veress needle inserted. Opening pressure was 1 mmHg. The abdomen was insufflated with CO2 to a peak pressure of 15mmHg. After completion of insufflation, the Veress needle was removed. The supra-umbilical area was injected with 0.25% bupivacaine with epinephrine. The skin was incised with a scalpel and a 8mm trocar was inserted. Inspection revealed no evidence of entry injury. The upper abdomen appeared  grossly normal. The patient was placed into Trendelenburg position. The pelvis appeared normal other than the adhesions noted above    Two additional 8mm trocars were placed in each lower quadrant, under direct visualization, after injecting the same local anesthetic. The robot was docked.    The mesosalpinx was ligated bilaterally using the synchroseal. Adhesions between the left tube and bladder were taken down with monopolar scissors.The round ligaments were transected with the Synchroseal.   The course of the ureters were identified transperitoneally. The Uterovarian ligaments were transected with the syncroseal.The vesico-uterine peritoneum was dissected towards the midline and the bladder flap was created over the cervix. The uterine arteries were skeletonized and secured then transected. The cardinal ligaments were taken down in similar fashion. Posterior colpotomy was made over the manipulator cup and carried around circumferentially. The specimen was detached and removed from the vagina intact.     The vaginal cuff was closed laparoscopically with 0 Stratafix suture in running fashion. All pedicles were hemostatic. The abdomen was desufflated and the trocars were removed. Skin incisions were closed with 3-0 Vicryl. Dermabond was applied.     The Blackwell was removed and the bladder was back-filled with sterile water. Bladder survey revealed no evidence of bladder injury or foreign materials. Brisk jetting of urine was seen from bilateral ureteral orifices. The bladder was  drained. The Blackwell was not replaced.     All counts were correct. The patient was awakened and taken to the PACU.    Rodolfo Hauser MD  Obstetrics and Gynecology  3/8/2024 14:55 EST

## 2024-03-08 NOTE — ANESTHESIA POSTPROCEDURE EVALUATION
Patient: Dianna Caicedo    Procedure Summary       Date: 03/08/24 Room / Location:  [facility] [address] [place] [address] [facility]    Anesthesia Start: 1250 Anesthesia Stop: 1511    Procedure: ROBOT ASSISTED TOTAL LAPAROSCOPIC HYSTERECTOMY BILATERAL SALPINGECTOMY,  CYSTOSCOPY (Abdomen) Diagnosis:       Abnormal uterine bleeding (AUB)      (Abnormal uterine bleeding (AUB) [N93.9])    Surgeons: [facility] Hauser MD Provider: Reese Perry MD    Anesthesia Type: general ASA Status: 3            Anesthesia Type: general    Vitals  No vitals data found for the desired time range.          Post Anesthesia Care and Evaluation    Patient location during evaluation: PACU  Patient participation: complete - patient participated  Level of consciousness: responsive to verbal stimuli  Pain score: 0  Pain management: adequate    Airway patency: patent  Anesthetic complications: No anesthetic complications  PONV Status: none  Cardiovascular status: hemodynamically stable and acceptable  Respiratory status: nonlabored ventilation, acceptable, nasal cannula and spontaneous ventilation  Hydration status: acceptable    Comments: VSS  No anesthesia care post op

## 2024-03-08 NOTE — ANESTHESIA PROCEDURE NOTES
Airway  Urgency: elective    Date/Time: 3/8/2024 12:56 PM  Airway not difficult    General Information and Staff    Patient location during procedure: OR  Anesthesiologist: Reese Perry MD    Indications and Patient Condition  Indications for airway management: airway protection    Preoxygenated: yes  MILS not maintained throughout  Mask difficulty assessment: 1 - vent by mask    Final Airway Details  Final airway type: endotracheal airway      Successful airway: ETT  Cuffed: yes   Successful intubation technique: direct laryngoscopy  Endotracheal tube insertion site: oral  Blade: Sutton  Blade size: 3  ETT size (mm): 7.0  Cormack-Lehane Classification: grade I - full view of glottis  Placement verified by: chest auscultation and capnometry   Measured from: lips  ETT/EBT  to lips (cm): 20  Number of attempts at approach: 1  Assessment: lips, teeth, and gum same as pre-op and atraumatic intubation    Additional Comments  Negative epigastric sounds, Breath sound equal bilaterally with symmetric chest rise and fall

## 2024-03-08 NOTE — INTERVAL H&P NOTE
[facility] Pre-op    Full history and physical note from office is attached.    VS: /91  HR 82  RR 16  T 97.0  sat 98%RA    Immunizations:  Influenza:  No  Pneumococcal:  No  Tetanus:  UTD  Covid : x2    LAB Results:  Lab Results   Component Value Date    WBC 5.71 03/06/2024    HGB 14.5 03/06/2024    HCT 41.0 03/06/2024    MCV 86.5 03/06/2024     03/06/2024    NEUTROABS 4.44 03/06/2024    GLUCOSE 105 (H) 03/06/2024    BUN 11 03/06/2024    CREATININE 0.69 03/06/2024    EGFRIFNONA 59 (L) 12/17/2021     03/06/2024    K 3.6 03/06/2024     03/06/2024    CO2 27.0 03/06/2024    MG 2.2 12/06/2023    CALCIUM 8.6 03/06/2024    ALBUMIN 4.4 12/06/2023    AST 16 12/06/2023    ALT 10 12/06/2023    BILITOT 0.7 12/06/2023       Cancer Staging (if applicable)  Cancer Patient: __ yes __no __unknown__N/A; If yes, clinical stage T:__ N:__M:__, stage group or __N/A      Impression: Abnormal uterine bleeding      Plan: ROBOT ASSISTED TOTAL LAPAROSCOPIC HYSTERECTOMY BILATERAL SALPINGECTOMY, POSSIBLE EXCISION OF ENDOMETRIOSIS, CYSTOSCOPY       AVINASH Rubin   3/8/2024   10:52 EST

## 2024-03-17 NOTE — PROGRESS NOTES
[facility]  [address]  [place] [address]     Office Note      Date: 2024  Patient Name: Dianna Caicedo  MRN: [id number]  : 1985    Subjective     Chief Complaint  Obesity Management follow-up    Dianna Caicedo presents to [facility] for obesity management.       Patient is satisfied with weight loss progress. Appetite is well controlled. Reports no side effects of prescribed medications today. Denies feeling of hypoglycemia. The patient is taking multivitamin and is taking fish oil.  The patient is using a food journal. She journaled around 10 days this past month. She feels she doesn't do the best with fruits and vegetables but feels that this will improve over the summer months.  The patient rates current efforts as 8 out of 10.    The patient is exercising with a FITT score of:    Frequency Intensity Time Strength Training   []   0, none []   0 []   0 [x]   0   [x]   1 (1-2x/week) [x]   1 (light) []   1 (<10 min) []   1 (1x/week)   []   2 (3-5x/week) []   2 (moderate) []   2 (10-20 min) []   2 (2x/week)   []   3 (daily) []   3 (moderately hard)  []   4 (very hard) [x]   3 (20-30 min)  []   4 (>30 min) []   3 (3-4x/week)     Review of Systems   Constitutional:  Negative for appetite change and fatigue.   Eyes:  Negative for visual disturbance.   Cardiovascular:  Negative for chest pain and palpitations.   Gastrointestinal:  Negative for constipation (controlled with docusate sodium) and indigestion.   Neurological:  Negative for light-headedness.       Objective   Start weight: 243.8 pounds.    Total Loss lb/%Loss of beginning body weight (BBW): -17.8lb/-7.30%  Change in weight since last visit: -2.6    Recent Weight History:   Wt Readings from Last 6 Encounters:   24 104 kg (229 lb 6.4 oz)   24 103 kg (226 lb)   24 106 kg (233 lb)   24 106 kg (233 lb 5.7 oz)   24 106 kg (233 lb  "3.2 oz)   02/19/24 104 kg (228 lb 6.4 oz)     Body mass index is 34.36 kg/m².   Body composition analysis completed and showed:   Body Fat %: 47.3    Measurements (in inches)  Waist Circumference: 45    Vital Signs:   /80 (BP Location: Left arm, Patient Position: Sitting)   Pulse 103   Ht 172.7 cm (68\")   Wt 103 kg (226 lb)   SpO2 99%   BMI 34.36 kg/m²       Physical Exam  Vitals reviewed.   Constitutional:       Appearance: She is obese. She is not ill-appearing.   Pulmonary:      Effort: Pulmonary effort is normal.   Neurological:      Mental Status: She is alert.   Psychiatric:         Attention and Perception: Attention and perception normal.         Behavior: Behavior is cooperative.        Result Review :     Common labs          12/6/2023    15:02 1/8/2024    09:06 3/6/2024    14:48   Common Labs   Glucose 77   105    BUN 14   11    Creatinine 0.81   0.69    Sodium 140   141    Potassium 3.9   3.6    Chloride 102   103    Calcium 9.1   8.6    Albumin 4.4      Total Bilirubin 0.7      Alkaline Phosphatase 54      AST (SGOT) 16      ALT (SGPT) 10      WBC 9.36   5.71    Hemoglobin 15.2   14.5    Hematocrit 44.9   41.0    Platelets 215   151    Hemoglobin A1C  4.60  4.30             Assessment / Plan        Diagnoses and all orders for this visit:    1. Obesity (BMI 30-39.9) (Primary)  Assessment & Plan:  Patient's (Body mass index is 34.36 kg/m².) indicates that they are obese (BMI >30) with health conditions that include hypertension and dyslipidemias . Weight is improving with treatment. BMI  is above average; BMI management plan is completed. We discussed low calorie, low carb based diet program, portion control, increasing exercise, pharmacologic options including Wegovy, and an joe-based approach such as Whooch Pal or Lose It.   --This is a follow up visit and she is down around 2.6lbs  -- One of her main goals was to start food journaling and she did somewhat get in the habit journaling " around 10 days this past month.  Will continue to make this her #1 goal we did adjust her calories based on her basal metabolic rate.  Her protein remains 100 g and her carbs remain 40% of her overall calorie goal.  We did review her latest A1c which remains low.  She currently is not having any signs or symptoms of hypoglycemia but she is aware of them.  Advised to not completely cut out carbs with vitamin and healthier forms such as plant-based foods or whole grains.  --She is recovering from a recent hysterectomy but after her restrictions are lifted she wants to add more mindful movement.  Goal of 30 minutes mindful movement a day. Currently getting 3K steps goal of at min 5K day.   --A1C in 2 months    Orders:  -     Semaglutide-Weight Management (Wegovy) 1.7 MG/0.75ML solution auto-injector; Inject 0.75 mL under the skin into the appropriate area as directed 1 (One) Time Per Week for 30 days.  Dispense: 3 mL; Refill: 1      We discussed the risks, benefits, and limitations of treatments. Continue medications and OTC supplements as discussed. Patient verbalizes understanding of and agreement with management plan.     Follow Up   Return in about 4 weeks (around 4/15/2024).  Patient was given instructions and counseling regarding her condition or for health maintenance advice. Please see specific information pulled into the AVS if appropriate.     I spent 40 minutes on this date of service. This time includes time spent by me in the following activities:preparing for the visit, counseling and educating the patient/family/caregiver, ordering medications, tests, or procedures and documenting information in the medical record.    Chuyita Johnson, APRN  03/18/2024

## 2024-03-18 ENCOUNTER — OFFICE VISIT (OUTPATIENT)
Dept: BARIATRICS/WEIGHT MGMT | Facility: CLINIC | Age: 39
End: 2024-03-18
Payer: OTHER GOVERNMENT

## 2024-03-18 ENCOUNTER — OFFICE VISIT (OUTPATIENT)
Dept: CARDIOLOGY | Facility: CLINIC | Age: 39
End: 2024-03-18
Payer: OTHER GOVERNMENT

## 2024-03-18 VITALS
BODY MASS INDEX: 34.25 KG/M2 | OXYGEN SATURATION: 99 % | DIASTOLIC BLOOD PRESSURE: 80 MMHG | HEIGHT: 68 IN | SYSTOLIC BLOOD PRESSURE: 124 MMHG | WEIGHT: 226 LBS | HEART RATE: 103 BPM

## 2024-03-18 VITALS
BODY MASS INDEX: 34.77 KG/M2 | WEIGHT: 229.4 LBS | SYSTOLIC BLOOD PRESSURE: 120 MMHG | HEIGHT: 68 IN | DIASTOLIC BLOOD PRESSURE: 60 MMHG | OXYGEN SATURATION: 97 % | HEART RATE: 98 BPM

## 2024-03-18 DIAGNOSIS — E66.9 OBESITY (BMI 30-39.9): Primary | ICD-10-CM

## 2024-03-18 DIAGNOSIS — R55 SYNCOPE AND COLLAPSE: ICD-10-CM

## 2024-03-18 DIAGNOSIS — I10 ESSENTIAL HYPERTENSION: Primary | ICD-10-CM

## 2024-03-18 PROCEDURE — 99215 OFFICE O/P EST HI 40 MIN: CPT | Performed by: NURSE PRACTITIONER

## 2024-03-18 PROCEDURE — 99213 OFFICE O/P EST LOW 20 MIN: CPT | Performed by: INTERNAL MEDICINE

## 2024-03-18 RX ORDER — SEMAGLUTIDE 1.7 MG/.75ML
1.7 INJECTION, SOLUTION SUBCUTANEOUS WEEKLY
Qty: 3 ML | Refills: 1 | Status: SHIPPED | OUTPATIENT
Start: 2024-03-18 | End: 2024-04-17

## 2024-03-18 RX ORDER — SODIUM FLUORIDE 6 MG/ML
PASTE, DENTIFRICE DENTAL
COMMUNITY
Start: 2024-03-05

## 2024-03-18 NOTE — PROGRESS NOTES
[facility] Cardiology  Office visit  Dianna Caicedo  1985  [phone] (work)    VISIT DATE:  3/18/2024    PCP: Charisse Meyer PA-C  [address]  [facility] [address]    CC:  Chief Complaint   Patient presents with    Morbid obesity with BMI of 45.0-49.9, adult       Previous cardiac studies and procedures:    ASSESSMENT:   Diagnosis Plan   1. Essential hypertension        2. Syncope and collapse            PLAN:  Presyncope/syncope: Combination of delayed orthostasis and intermittent neurocardiogenic syncope.  Continue avoid dehydration.  Discussed liberalizing sodium intake during flares.  Encouraged regular exercise, combination of both aerobic and strength training.  Discussed abortive maneuvers.  Not recommending initiation of pharmacologic therapy at this time.    Subjective  Interval assessment: No recent episodes of syncope.  Intermittent lightheadedness in stable position.  Blood pressures probably running less than 130/80 mmHg.  Denies chest pain, palpitations or dyspnea on exertion.  Exercising on a regular basis.  Currently on Eliquis for 1 month post recent hysterectomy for postsurgical prevention of DVT.    Initial evaluation: 37-year-old morbidly obese female with a history of hypertension, dyslipidemia, NGUYEN on CPAP and vasovagal syncope being evaluated for bariatric surgery with sleeve gastrectomy. She denies chest pain, palpitations, dyspnea on exertion. She is able to complete at least 4-6 METS of exertion without symptoms concerning for angina or CHF. Blood pressures running less than 130/80 mmHg on current medical therapy. She is compliant with medical therapy. Previous history of neurocardiogenic syncope which is significant improved in frequency over time, most recent episode in February 2020 with negative cardiac evaluation to include unremarkable CT angio chest, transthoracic echo, and 30-day ambulatory ECG monitor.  "Normal twelve-lead EKG today. Nondiabetic. Non-smoker. No early family history of coronary disease.     PHYSICAL EXAMINATION:  Vitals:    03/18/24 0959   BP: 120/60   Pulse: 98   SpO2: 97%   Weight: 104 kg (229 lb 6.4 oz)   Height: 172.7 cm (68\")     General Appearance:    Alert, cooperative, no distress, appears stated age   Head:    Normocephalic, without obvious abnormality, atraumatic   Eyes:    conjunctiva/corneas clear   Nose:   Nares normal, septum midline, mucosa normal, no drainage   Throat:   Lips, teeth and gums normal   Neck:   Supple, symmetrical, trachea midline, no carotid    bruit or JVD   Lungs:     Clear to auscultation bilaterally, respirations unlabored   Chest Wall:    No tenderness or deformity    Heart:    Regular rate and rhythm, S1 and S2 normal, no murmur, rub   or gallop, normal carotid impulse bilaterally without bruit.   Abdomen:     Soft, non-tender   Extremities:   Extremities normal, atraumatic, no cyanosis or edema   Pulses:   2+ and symmetric all extremities   Skin:   Skin color, texture, turgor normal, no rashes or lesions       Diagnostic Data:  Procedures  Lab Results   Component Value Date    CHLPL 154 10/16/2023    TRIG 116 10/16/2023    HDL 45 10/16/2023     Lab Results   Component Value Date    GLUCOSE 105 (H) 03/06/2024    BUN 11 03/06/2024    CREATININE 0.69 03/06/2024     03/06/2024    K 3.6 03/06/2024     03/06/2024    CO2 27.0 03/06/2024     Lab Results   Component Value Date    HGBA1C 4.30 (L) 03/06/2024     Lab Results   Component Value Date    WBC 5.71 03/06/2024    HGB 14.5 03/06/2024    HCT 41.0 03/06/2024     03/06/2024       Allergies  Allergies   Allergen Reactions    Nifedipine Shortness Of Breath, Swelling and Rash     Patient reports general swelling, slow onset shortness of breath, rash around wrists and ankles.  Has not taken other Ca channel blockers that she is aware of.    Sulfa Antibiotics Swelling     Throat swells    Phentermine " Other (See Comments)     Dose not higher doses 37.5 related to heart palpitations.        Current Medications    Current Outpatient Medications:     acetaminophen (TYLENOL) 500 MG tablet, Take 2 tablets every 6 hours for 2 days. Afterwards, take 2 tablets every 6 hours as needed., Disp: 80 tablet, Rfl: 0    apixaban (ELIQUIS) 2.5 MG tablet tablet, Take 1 tablet by mouth 2 (Two) Times a Day for 30 days., Disp: 60 tablet, Rfl: 0    docusate sodium (COLACE) 250 MG capsule, Take 1 capsule by mouth Daily., Disp: 365 capsule, Rfl: 0    FLUoxetine (PROzac) 40 MG capsule, Take 1 capsule by mouth Daily., Disp: 90 capsule, Rfl: 2    ibuprofen (ADVIL,MOTRIN) 600 MG tablet, Take 1 tablet every 6 hours for 2 days. Afterwards, take 1 tablet every 6 hours as needed., Disp: 40 tablet, Rfl: 0    nystatin (MYCOSTATIN) 697378 UNIT/GM powder, Apply  topically to the appropriate area as directed 3 (Three) Times a Day., Disp: 60 g, Rfl: 1    omeprazole (priLOSEC) 40 MG capsule, Take 1 capsule by mouth Daily., Disp: 180 capsule, Rfl: 1    psyllium (METAMUCIL) 0.52 g capsule, Take 2 capsules by mouth 2 (Two) Times a Day. Titrate up as directed. Take with a minimum of 8oz water., Disp: , Rfl:     Semaglutide-Weight Management (Wegovy) 1.7 MG/0.75ML solution auto-injector, Inject 0.75 mL under the skin into the appropriate area as directed 1 (One) Time Per Week for 30 days., Disp: 3 mL, Rfl: 1    Sodium Fluoride 5000 PPM 1.1 % paste, USE ONCE DAILY IN PLACE OF REGULAR TOOTHPASTE, Disp: , Rfl:     tiZANidine (ZANAFLEX) 2 MG tablet, Take 1 tablet by mouth At Night As Needed for Muscle Spasms., Disp: 30 tablet, Rfl: 11    Current Facility-Administered Medications:     cyanocobalamin injection 1,000 mcg, 1,000 mcg, Intramuscular, Q28 Days, Matcheswala, Chuyita, APRN, 1,000 mcg at 02/19/24 0941          ROS  ROS      SOCIAL HX  Social History     Socioeconomic History    Marital status:    Tobacco Use    Smoking status: Never     Passive  exposure: Never    Smokeless tobacco: Never   Vaping Use    Vaping status: Never Used   Substance and Sexual Activity    Alcohol use: Not Currently     Comment: Social User    Drug use: No    Sexual activity: Yes     Partners: Male     Birth control/protection: None, Vasectomy       FAMILY HX  Family History   Problem Relation Age of Onset    Prostate cancer Father         2023    Sleep apnea Father     Epilepsy Mother     No Known Problems Sister     Diabetes Paternal Grandfather     Lung cancer Paternal Grandfather     Cancer Maternal Grandmother     Arthritis Maternal Grandmother     Skin cancer Maternal Grandfather     Hypertension Maternal Grandfather     Diabetes Maternal Grandfather     Melanoma Maternal Grandfather     Lung cancer Maternal Grandfather         stage 4             Meliton Hassan III, MD, FACC

## 2024-03-18 NOTE — ASSESSMENT & PLAN NOTE
Patient's (Body mass index is 34.36 kg/m².) indicates that they are obese (BMI >30) with health conditions that include hypertension and dyslipidemias . Weight is improving with treatment. BMI  is above average; BMI management plan is completed. We discussed low calorie, low carb based diet program, portion control, increasing exercise, pharmacologic options including Wegovy, and an joe-based approach such as Bullhorn Pal or Lose It.   --This is a follow up visit and she is down around 2.6lbs  -- One of her main goals was to start food journaling and she did somewhat get in the habit journaling around 10 days this past month.  Will continue to make this her #1 goal we did adjust her calories based on her basal metabolic rate.  Her protein remains 100 g and her carbs remain 40% of her overall calorie goal.  We did review her latest A1c which remains low.  She currently is not having any signs or symptoms of hypoglycemia but she is aware of them.  Advised to not completely cut out carbs with vitamin and healthier forms such as plant-based foods or whole grains.  --She is recovering from a recent hysterectomy but after her restrictions are lifted she wants to add more mindful movement.  Goal of 30 minutes mindful movement a day. Currently getting 3K steps goal of at min 5K day.   --A1C in 2 months

## 2024-03-21 ENCOUNTER — OFFICE VISIT (OUTPATIENT)
Dept: NEUROLOGY | Facility: CLINIC | Age: 39
End: 2024-03-21
Payer: OTHER GOVERNMENT

## 2024-03-21 VITALS
WEIGHT: 229 LBS | HEART RATE: 120 BPM | HEIGHT: 68 IN | OXYGEN SATURATION: 98 % | SYSTOLIC BLOOD PRESSURE: 124 MMHG | DIASTOLIC BLOOD PRESSURE: 68 MMHG | BODY MASS INDEX: 34.71 KG/M2

## 2024-03-21 DIAGNOSIS — R55 SYNCOPE AND COLLAPSE: Primary | ICD-10-CM
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
## 2024-03-25 ENCOUNTER — OFFICE VISIT (OUTPATIENT)
Dept: OBSTETRICS AND GYNECOLOGY | Facility: CLINIC | Age: 39
End: 2024-03-25
Payer: OTHER GOVERNMENT

## 2024-03-25 VITALS
WEIGHT: 230.4 LBS | BODY MASS INDEX: 34.92 KG/M2 | HEIGHT: 68 IN | SYSTOLIC BLOOD PRESSURE: 134 MMHG | DIASTOLIC BLOOD PRESSURE: 88 MMHG

## 2024-03-25 DIAGNOSIS — Z90.710 S/P LAPAROSCOPIC HYSTERECTOMY: Primary | ICD-10-CM

## 2024-03-25 PROCEDURE — 99024 POSTOP FOLLOW-UP VISIT: CPT | Performed by: OBSTETRICS & GYNECOLOGY
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
## 2024-03-26 ENCOUNTER — OFFICE VISIT (OUTPATIENT)
Dept: SLEEP MEDICINE | Facility: CLINIC | Age: 39
End: 2024-03-26
Payer: OTHER GOVERNMENT

## 2024-03-26 ENCOUNTER — OFFICE VISIT (OUTPATIENT)
Dept: OBSTETRICS AND GYNECOLOGY | Facility: CLINIC | Age: 39
End: 2024-03-26
Payer: OTHER GOVERNMENT

## 2024-03-26 ENCOUNTER — TELEPHONE (OUTPATIENT)
Dept: OBSTETRICS AND GYNECOLOGY | Facility: CLINIC | Age: 39
End: 2024-03-26

## 2024-03-26 VITALS
SYSTOLIC BLOOD PRESSURE: 120 MMHG | BODY MASS INDEX: 34.56 KG/M2 | WEIGHT: 228 LBS | HEIGHT: 68 IN | DIASTOLIC BLOOD PRESSURE: 72 MMHG

## 2024-03-26 VITALS
WEIGHT: 235 LBS | SYSTOLIC BLOOD PRESSURE: 132 MMHG | DIASTOLIC BLOOD PRESSURE: 84 MMHG | HEART RATE: 99 BPM | BODY MASS INDEX: 35.61 KG/M2 | OXYGEN SATURATION: 100 % | HEIGHT: 68 IN | TEMPERATURE: 97.5 F

## 2024-03-26 DIAGNOSIS — N99.820 POSTOPERATIVE VAGINAL BLEEDING FOLLOWING GENITOURINARY PROCEDURE: Primary | ICD-10-CM

## 2024-03-26 DIAGNOSIS — G47.33 OSA ON CPAP: Primary | ICD-10-CM

## 2024-03-26 PROCEDURE — 99024 POSTOP FOLLOW-UP VISIT: CPT | Performed by: OBSTETRICS & GYNECOLOGY

## 2024-03-26 PROCEDURE — 99213 OFFICE O/P EST LOW 20 MIN: CPT | Performed by: NURSE PRACTITIONER

## 2024-03-26 NOTE — PROGRESS NOTES
Chief Complaint:   Chief Complaint   Patient presents with    Follow-up    Sleep Apnea       HPI:    Dianna Caicedo is a 39 y.o. female here for follow-up of sleep apnea.  Patient was last seen 3/28/2023.  Patient continues to do well with CPAP therapy.  Patient sleeps 7 to 9 hours nightly and does feel rested upon awakening.  Patient goes to sleep quickly and is very rare she gets up in the night.  Patient has an Slemp score of 3/24.  Patient does well with heated tubing and nasal mask.  Patient has no concerns or complaints and will continue therapy.        Current medications are:   Current Outpatient Medications:     acetaminophen (TYLENOL) 500 MG tablet, Take 2 tablets every 6 hours for 2 days. Afterwards, take 2 tablets every 6 hours as needed., Disp: 80 tablet, Rfl: 0    apixaban (ELIQUIS) 2.5 MG tablet tablet, Take 1 tablet by mouth 2 (Two) Times a Day for 30 days., Disp: 60 tablet, Rfl: 0    docusate sodium (COLACE) 250 MG capsule, Take 1 capsule by mouth Daily., Disp: 365 capsule, Rfl: 0    FLUoxetine (PROzac) 40 MG capsule, Take 1 capsule by mouth Daily., Disp: 90 capsule, Rfl: 2    ibuprofen (ADVIL,MOTRIN) 600 MG tablet, Take 1 tablet every 6 hours for 2 days. Afterwards, take 1 tablet every 6 hours as needed., Disp: 40 tablet, Rfl: 0    nystatin (MYCOSTATIN) 339923 UNIT/GM powder, Apply  topically to the appropriate area as directed 3 (Three) Times a Day., Disp: 60 g, Rfl: 1    omeprazole (priLOSEC) 40 MG capsule, Take 1 capsule by mouth Daily., Disp: 180 capsule, Rfl: 1    psyllium (METAMUCIL) 0.52 g capsule, Take 2 capsules by mouth 2 (Two) Times a Day. Titrate up as directed. Take with a minimum of 8oz water., Disp: , Rfl:     Semaglutide-Weight Management (Wegovy) 1.7 MG/0.75ML solution auto-injector, Inject 0.75 mL under the skin into the appropriate area as directed 1 (One) Time Per Week for 30 days., Disp: 3 mL, Rfl: 1    Sodium Fluoride 5000 PPM 1.1 % paste, USE ONCE DAILY IN PLACE OF  REGULAR TOOTHPASTE, Disp: , Rfl:     tiZANidine (ZANAFLEX) 2 MG tablet, Take 1 tablet by mouth At Night As Needed for Muscle Spasms., Disp: 30 tablet, Rfl: 11    Current Facility-Administered Medications:     cyanocobalamin injection 1,000 mcg, 1,000 mcg, Intramuscular, Q28 Days, Chuyita Johnson APRN, 1,000 mcg at 02/19/24 0941.      The patient's relevant past medical, surgical, family and social history were reviewed and updated in Epic as appropriate.       Review of Systems   Respiratory:  Positive for apnea.    Cardiovascular:  Positive for leg swelling.   Gastrointestinal:         Heartburn   Musculoskeletal:  Positive for arthralgias and myalgias.   Neurological:  Positive for headaches.   Psychiatric/Behavioral:  Positive for dysphoric mood and sleep disturbance. The patient is nervous/anxious.    All other systems reviewed and are negative.        Objective:    Physical Exam  Constitutional:       Appearance: Normal appearance.   HENT:      Head: Normocephalic and atraumatic.      Mouth/Throat:      Comments: Mallampati 3 anatomy  Cardiovascular:      Rate and Rhythm: Normal rate and regular rhythm.   Pulmonary:      Effort: Pulmonary effort is normal.      Breath sounds: Normal breath sounds.   Skin:     General: Skin is warm and dry.   Neurological:      Mental Status: She is alert and oriented to person, place, and time.   Psychiatric:         Mood and Affect: Mood normal.         Behavior: Behavior normal.         Thought Content: Thought content normal.         Judgment: Judgment normal.         CPAP Report    88/90 nights of use  Greater than 4-hour use 93.3  90% pressure 8.8  AHI of 3.2  Seen 6-20  The patient continues to use and benefit from CPAP therapy.    ASSESSMENT/PLAN    Diagnoses and all orders for this visit:    1. NGUYEN on CPAP (Primary)  -     PAP Therapy        Counseled patient regarding multimodal approach with healthy nutrition, healthy sleep, regular physical activity, social  activities, counseling, and medications. Encouraged to practice lateral sleep position. Avoid alcohol and sedatives close to bedtime.      Refill supplies x 1 year.  Return to clinic 1 year or sooner as symptoms warrant.    Signed by  AVINASH Harris    March 26, 2024      CC: Charisse Meyer, NOE         No ref. provider found

## 2024-03-27 ENCOUNTER — TELEPHONE (OUTPATIENT)
Dept: OBSTETRICS AND GYNECOLOGY | Facility: CLINIC | Age: 39
End: 2024-03-27
Payer: OTHER GOVERNMENT

## 2024-03-27 NOTE — TELEPHONE ENCOUNTER
Called and spoke with patient. Recommend EUA tomorrow for better visualization and control of bleeding. Will proceed more urgently if bleeding >1pad per hour overnight

## 2024-03-28 ENCOUNTER — DOCUMENTATION (OUTPATIENT)
Dept: OBSTETRICS AND GYNECOLOGY | Facility: CLINIC | Age: 39
End: 2024-03-28

## 2024-03-28 ENCOUNTER — OUTSIDE FACILITY SERVICE (OUTPATIENT)
Dept: OBSTETRICS AND GYNECOLOGY | Facility: CLINIC | Age: 39
End: 2024-03-28
Payer: OTHER GOVERNMENT

## 2024-03-28 DIAGNOSIS — N99.820 POSTOPERATIVE VAGINAL BLEEDING FOLLOWING GENITOURINARY PROCEDURE: Primary | ICD-10-CM
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
## 2024-03-28 NOTE — OP NOTE
Gynecology  Procedure Note     Patient Name: Dianna Caicedo   : 1985   MRN: [id number]   Place of Service: [facility]   Date of Service: 3/28/2024     Pre-op Diagnosis: Postoperative vaginal bleeding following genitourinary procedure [N99.820]      Post-op Diagnosis: Postoperative vaginal bleeding following genitourinary procedure [N99.820]      Procedure:  Exam under anesthesia, oversew vaginal cuff    Surgeon: Rodolfo Hauser MD    Findings: Grossly normal-appearing vaginal cuff with no active bleeding.  Upon manipulation, and area of bleeding was noted from the left angle    Estimated Blood Loss: none    Drains: none     Specimens: None    Complications: none      Disposition: stable     Dianna Caicedo presented to [facility] on 3/28/2024. The surgeon and patient were mutually identified in the preoperative area. Her consents were reconfirmed and her questions were again answered. She was taken to the operating suite and administered General endotracheal anesthesia anesthesia . She was placed in the dorsal lithotomy position. She was prepped and draped in the usual sterile fashion. A timeout was performed. A Blank and weighted speculum were placed in the patient's vagina. The vaginal cuff appeared grossly normal and unchanged from my exam in the office 48 hours prior.  Upon manipulation of the vaginal cuff, very slow oozing was noted from the left angle of the vaginal cuff.  This area was oversewn with 2 figure-of-eight sutures of 0 Vicryl.  Judicious use of Bovie was used.  Lastly, Monsel solution was placed over the area and prophylactic fashion.  Hemostasis was achieved and the patient was taken to the recovery room in stable condition.      I performed the entire procedure.    Rodolfo Hauser MD  Obstetrics and Gynecology  3/28/2024 15:43 EDT

## 2024-03-29 ENCOUNTER — TELEPHONE (OUTPATIENT)
Dept: OBSTETRICS AND GYNECOLOGY | Facility: CLINIC | Age: 39
End: 2024-03-29
Payer: OTHER GOVERNMENT

## 2024-04-01 ENCOUNTER — OFFICE VISIT (OUTPATIENT)
Dept: OBSTETRICS AND GYNECOLOGY | Facility: CLINIC | Age: 39
End: 2024-04-01
Payer: OTHER GOVERNMENT

## 2024-04-01 VITALS
BODY MASS INDEX: 34.62 KG/M2 | WEIGHT: 228.4 LBS | DIASTOLIC BLOOD PRESSURE: 84 MMHG | HEIGHT: 68 IN | SYSTOLIC BLOOD PRESSURE: 126 MMHG

## 2024-04-01 DIAGNOSIS — N99.820 POSTOPERATIVE VAGINAL BLEEDING FOLLOWING GENITOURINARY PROCEDURE: Primary | ICD-10-CM

## 2024-04-01 PROCEDURE — 99024 POSTOP FOLLOW-UP VISIT: CPT | Performed by: OBSTETRICS & GYNECOLOGY
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
## 2024-04-04 ENCOUNTER — OFFICE VISIT (OUTPATIENT)
Dept: BARIATRICS/WEIGHT MGMT | Facility: CLINIC | Age: 39
End: 2024-04-04
Payer: OTHER GOVERNMENT

## 2024-04-04 VITALS
BODY MASS INDEX: 34.02 KG/M2 | OXYGEN SATURATION: 99 % | SYSTOLIC BLOOD PRESSURE: 134 MMHG | HEART RATE: 107 BPM | RESPIRATION RATE: 16 BRPM | DIASTOLIC BLOOD PRESSURE: 88 MMHG | TEMPERATURE: 97.1 F | HEIGHT: 68 IN | WEIGHT: 224.5 LBS

## 2024-04-04 DIAGNOSIS — E66.9 OBESITY (BMI 30-39.9): Primary | ICD-10-CM
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
## 2024-04-22 ENCOUNTER — TELEPHONE (OUTPATIENT)
Dept: OBSTETRICS AND GYNECOLOGY | Facility: CLINIC | Age: 39
End: 2024-04-22
Payer: OTHER GOVERNMENT

## 2024-04-22 NOTE — PROGRESS NOTES
[facility]  [address]  [place] [address]     Office Note      Date: 2024  Patient Name: Dianna Caicedo  MRN: [id number]  : 1985    Subjective     Chief Complaint  Obesity Management follow-up    Dianna Caicedo presents to [facility] for obesity management.     Patient is satisfied with weight loss progress. Appetite is well controlled. Reports no side effects of prescribed medications today, on Wegovy 1.7 mg weekly. The patient is taking multivitamin and is not taking fish oil.  The patient is using a food journal but not consistently.  Has increased exercise but not always following diet plan consistently, tries for high protein, low carb.Missed one week of Wegovy. Last 2 weeks dose not feel like she is eating enough. The patient rates current efforts as 7 out of 10.  The patient is exercising with a FITT score of:    Frequency Intensity Time Strength Training   []   0, none []   0 []   0 [x]   0   []   1 (1-2x/week) [x]   1 (light) []   1 (<10 min) []   1 (1x/week)   []   2 (3-5x/week) []   2 (moderate) []   2 (10-20 min) []   2 (2x/week)   [x]   3 (daily) []   3 (moderately hard)  []   4 (very hard) []   3 (20-30 min)  [x]   4 (>30 min) []   3 (3-4x/week)     Review of Systems   Constitutional:  Negative for appetite change and fatigue.   Eyes:  Negative for visual disturbance.   Cardiovascular:  Negative for chest pain and palpitations.   Gastrointestinal:  Negative for constipation and indigestion.   Neurological:  Negative for light-headedness.   All other systems reviewed and are negative.      Objective   Start weight: 243.8 pounds.    Total Loss lb/%Loss of beginning body weight (BBW): -16.4 lb/-6.73 %  Change in weight since last visit: +1.4    Recent Weight History:   Wt Readings from Last 6 Encounters:   24 103 kg (227 lb 6.4 oz)   24 102 kg (224 lb 8 oz)   24 104 kg (228 lb 6.4  "oz)   03/26/24 103 kg (228 lb)   03/26/24 107 kg (235 lb)   03/25/24 105 kg (230 lb 6.4 oz)     Body mass index is 34.58 kg/m².   Body composition analysis completed and showed:   Body Fat %: 45.5    Measurements (in inches)  Waist Circumference: 44.5    Vital Signs:   /86 (BP Location: Left arm, Patient Position: Sitting)   Pulse 88   Ht 172.7 cm (68\")   Wt 103 kg (227 lb 6.4 oz)   BMI 34.58 kg/m²       Physical Exam  Vitals reviewed.   Constitutional:       Appearance: She is obese. She is not ill-appearing.   Pulmonary:      Effort: Pulmonary effort is normal.   Neurological:      Mental Status: She is alert.   Psychiatric:         Attention and Perception: Attention and perception normal.         Behavior: Behavior is cooperative.        Result Review :     Common labs          12/6/2023    15:02 1/8/2024    09:06 3/6/2024    14:48   Common Labs   Glucose 77   105    BUN 14   11    Creatinine 0.81   0.69    Sodium 140   141    Potassium 3.9   3.6    Chloride 102   103    Calcium 9.1   8.6    Albumin 4.4      Total Bilirubin 0.7      Alkaline Phosphatase 54      AST (SGOT) 16      ALT (SGPT) 10      WBC 9.36   5.71    Hemoglobin 15.2   14.5    Hematocrit 44.9   41.0    Platelets 215   151    Hemoglobin A1C  4.60  4.30          Assessment / Plan        Diagnoses and all orders for this visit:    1. Obesity (BMI 30-39.9) (Primary)  Assessment & Plan:  Patient's (Body mass index is 34.58 kg/m².) indicates that they are obese (BMI >30) with health conditions that include GERD and osteoarthritis . Weight is  stable but gained minimally from last month . BMI  is above average; BMI management plan is completed. We discussed low calorie, low carb based diet program, portion control, increasing exercise, and pharmacologic options including Wegovy .     --This is a follow up visit. Gain of 1.4 pounds since last visit.  --Increase calories by 100 to 1600, increase carb to less than 160 and continue protein 100 or " greater  --A1C low, decrease Wegovy to 1 mg weekly; discussed sign symptoms of hypoglycemia recommend healthy carb such as fruit if feeling hypoglycemic. Recheck A1C at next.  --Focus on journaling, she agrees that this will help her to be more accountable      Orders:  -     Semaglutide-Weight Management (Wegovy) 1 MG/0.5ML solution auto-injector; Inject 0.5 mL under the skin into the appropriate area as directed 1 (One) Time Per Week for 30 days.  Dispense: 2 mL; Refill: 1      We discussed the risks, benefits, and limitations of treatments. Continue medications and OTC supplements as discussed. Patient verbalizes understanding of and agreement with management plan.     Follow Up   Return in about 4 weeks (around 5/21/2024).  Patient was given instructions and counseling regarding her condition or for health maintenance advice. Please see specific information pulled into the AVS if appropriate.     I spent 30 minutes on this date of service. This time includes time spent by me in the following activities:preparing for the visit, counseling and educating the patient/family/caregiver, ordering medications, tests, or procedures and documenting information in the medical record.    Chuyita Johnson, APRN  04/23/2024

## 2024-04-22 NOTE — TELEPHONE ENCOUNTER
Provider: PATRICE    Caller: SELF    Relationship to Patient: SELF    Pharmacy:     Phone Number: [phone]    Reason for Call: PT HAD HYSTERECTOMY ON 3-8 THEN CAME BACK FOR ANOTHER SURGERY 3-28 FOR VAGINAL BLEEDING AND NOW HAS STARTED BLEEDING AS OF LAST NIGHT. NOT AS HEAVY AS IT WAS. WHEN SHE WIPES NOW SHE IS SEEING BRIGHT RED BLOOD. PT STATED THAT DR. IBRAHIM STATED IF IT HAPPENED AGAIN TO [facility] TO SEE A M.D. AND POSSIBLE IMAGING MAY BE NEEDED.  NA AT CLINICAL    When was the patient last seen: 04-01-24

## 2024-04-23 ENCOUNTER — OFFICE VISIT (OUTPATIENT)
Dept: BARIATRICS/WEIGHT MGMT | Facility: CLINIC | Age: 39
End: 2024-04-23
Payer: OTHER GOVERNMENT

## 2024-04-23 VITALS
HEIGHT: 68 IN | BODY MASS INDEX: 34.46 KG/M2 | SYSTOLIC BLOOD PRESSURE: 128 MMHG | WEIGHT: 227.4 LBS | DIASTOLIC BLOOD PRESSURE: 86 MMHG | HEART RATE: 88 BPM

## 2024-04-23 DIAGNOSIS — E66.9 OBESITY (BMI 30-39.9): Primary | ICD-10-CM

## 2024-04-23 PROCEDURE — 96372 THER/PROPH/DIAG INJ SC/IM: CPT | Performed by: NURSE PRACTITIONER

## 2024-04-23 PROCEDURE — 99214 OFFICE O/P EST MOD 30 MIN: CPT | Performed by: NURSE PRACTITIONER

## 2024-04-23 RX ORDER — SEMAGLUTIDE 1 MG/.5ML
1 INJECTION, SOLUTION SUBCUTANEOUS WEEKLY
Qty: 2 ML | Refills: 1 | Status: SHIPPED | OUTPATIENT
Start: 2024-04-23 | End: 2024-05-23

## 2024-04-23 RX ADMIN — CYANOCOBALAMIN 1000 MCG: 1000 INJECTION, SOLUTION INTRAMUSCULAR; SUBCUTANEOUS at 12:01

## 2024-04-23 NOTE — ASSESSMENT & PLAN NOTE
Patient's (Body mass index is 34.58 kg/m².) indicates that they are obese (BMI >30) with health conditions that include GERD and osteoarthritis . Weight is  stable but gained minimally from last month . BMI  is above average; BMI management plan is completed. We discussed low calorie, low carb based diet program, portion control, increasing exercise, and pharmacologic options including Wegovy .     --This is a follow up visit. Gain of 1.4 pounds since last visit.  --Increase calories by 100 to 1600, increase carb to less than 160 and continue protein 100 or greater  --A1C low, decrease Wegovy to 1 mg weekly; discussed sign symptoms of hypoglycemia recommend healthy carb such as fruit if feeling hypoglycemic. Recheck A1C at next.  --Focus on journaling, she agrees that this will help her to be more accountable

## 2024-04-29 ENCOUNTER — OFFICE VISIT (OUTPATIENT)
Dept: OBSTETRICS AND GYNECOLOGY | Facility: CLINIC | Age: 39
End: 2024-04-29
Payer: OTHER GOVERNMENT

## 2024-04-29 VITALS
SYSTOLIC BLOOD PRESSURE: 122 MMHG | DIASTOLIC BLOOD PRESSURE: 86 MMHG | BODY MASS INDEX: 34.92 KG/M2 | WEIGHT: 230.4 LBS | HEIGHT: 68 IN

## 2024-04-29 DIAGNOSIS — N99.820 POSTOPERATIVE VAGINAL BLEEDING FOLLOWING GENITOURINARY PROCEDURE: Primary | ICD-10-CM

## 2024-04-29 PROCEDURE — 99024 POSTOP FOLLOW-UP VISIT: CPT | Performed by: OBSTETRICS & GYNECOLOGY

## 2024-04-29 RX ORDER — MINOCYCLINE HYDROCHLORIDE 100 MG/1
1 CAPSULE ORAL EVERY 12 HOURS SCHEDULED
COMMUNITY
Start: 2024-04-25
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
## 2024-05-20 ENCOUNTER — OFFICE VISIT (OUTPATIENT)
Dept: OBSTETRICS AND GYNECOLOGY | Facility: CLINIC | Age: 39
End: 2024-05-20
Payer: OTHER GOVERNMENT

## 2024-05-20 VITALS
SYSTOLIC BLOOD PRESSURE: 138 MMHG | BODY MASS INDEX: 34.77 KG/M2 | DIASTOLIC BLOOD PRESSURE: 94 MMHG | WEIGHT: 229.4 LBS | HEIGHT: 68 IN

## 2024-05-20 DIAGNOSIS — N99.820 POSTOPERATIVE VAGINAL BLEEDING FOLLOWING GENITOURINARY PROCEDURE: Primary | ICD-10-CM

## 2024-05-20 PROCEDURE — 99024 POSTOP FOLLOW-UP VISIT: CPT | Performed by: OBSTETRICS & GYNECOLOGY
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
## 2024-06-04 NOTE — PROGRESS NOTES
[facility]  [address]  [place] [address]     Office Note      Date: 2024  Patient Name: Dianna Caicedo  MRN: [id number]  : 1985    Subjective     Chief Complaint  Obesity Management follow-up    Dianna Caicedo presents to [facility] for obesity management.       Patient is unsatisfied with weight loss progress. Appetite is poorly controlled. Reports no side effects of prescribed medications today. Reduced dose from 1.7 to 1mg. She has taken this new dose for the past 3 weeks. The patient is taking multivitamin and is not taking fish oil.  The patient is using a food journal.  The patient rates current efforts as 5 out of 10.    The patient is exercising with a FITT score of:    Frequency Intensity Time Strength Training   []   0, none []   0 []   0 [x]   0   [x]   1 (1-2x/week) [x]   1 (light) []   1 (<10 min) []   1 (1x/week)   []   2 (3-5x/week) []   2 (moderate) []   2 (10-20 min) []   2 (2x/week)   []   3 (daily) []   3 (moderately hard)  []   4 (very hard) [x]   3 (20-30 min)  []   4 (>30 min) []   3 (3-4x/week)     Review of Systems   Constitutional:  Negative for appetite change and fatigue.   Eyes:  Negative for visual disturbance.   Cardiovascular:  Negative for chest pain and palpitations.   Gastrointestinal:  Positive for constipation and indigestion.   Neurological:  Negative for light-headedness.     Objective   Start weight: 243.8 pounds.    Total Loss lb/%Loss of beginning body weight (BBW): -18.6lb/-7.62%  Change in weight since last visit: -2.4    Recent Weight History:   Wt Readings from Last 6 Encounters:   24 102 kg (225 lb 3.2 oz)   24 104 kg (229 lb 6.4 oz)   24 105 kg (230 lb 6.4 oz)   24 103 kg (227 lb 6.4 oz)   24 102 kg (224 lb 8 oz)   24 104 kg (228 lb 6.4 oz)     Body mass index is 34.24 kg/m².   Body composition analysis completed and showed:  "  Body Fat %: 45.6    Measurements (in inches)  Waist Circumference: 47    Vital Signs:   /82 (BP Location: Left arm, Patient Position: Sitting)   Pulse 93   Ht 172.7 cm (68\")   Wt 102 kg (225 lb 3.2 oz)   BMI 34.24 kg/m²       Physical Exam  Vitals reviewed.   Constitutional:       Appearance: She is obese. She is not ill-appearing.   Pulmonary:      Effort: Pulmonary effort is normal.   Neurological:      Mental Status: She is alert.   Psychiatric:         Attention and Perception: Attention and perception normal.         Behavior: Behavior is cooperative.        Result Review :     Common labs          12/6/2023    15:02 1/8/2024    09:06 3/6/2024    14:48   Common Labs   Glucose 77   105    BUN 14   11    Creatinine 0.81   0.69    Sodium 140   141    Potassium 3.9   3.6    Chloride 102   103    Calcium 9.1   8.6    Albumin 4.4      Total Bilirubin 0.7      Alkaline Phosphatase 54      AST (SGOT) 16      ALT (SGPT) 10      WBC 9.36   5.71    Hemoglobin 15.2   14.5    Hematocrit 44.9   41.0    Platelets 215   151    Hemoglobin A1C  4.60  4.30               Assessment / Plan        Diagnoses and all orders for this visit:    1. Obesity (BMI 30-39.9) (Primary)  Assessment & Plan:  Patient's (Body mass index is 34.24 kg/m².) indicates that they are obese (BMI >30) with health conditions that include hypertension and dyslipidemias . Weight is improving with treatment. BMI  is above average; BMI management plan is completed. We discussed low calorie, low carb based diet program, portion control, increasing exercise, pharmacologic options including Wegovy, and an joe-based approach such as [employer] Live Pal or Lose It.   -- This is a follow-up visit and patient is down around 2-1/2 pounds since last being seen approximately 6 weeks  ago.  --Wegovy dose was reduced to 1 mg related to low A1c.  Continue 1 mg dose for the next 2 months and repeat A1c.  --Reports falling off track as far as food preparation and food " choices.  Prioritized protein and nonstarchy vegetables and be mindful to stay at or below calorie and carb goals.  -- Reports some symptoms of reflux and constipation.  Encouraged her to continue docusate sodium and add MiraLAX daily as constipation prevention.  May use occasional as needed stimulant laxative if constipated.  If reflux symptoms continue or do not improve follow-up with primary care provider.    Orders:  -     Semaglutide-Weight Management (Wegovy) 1 MG/0.5ML solution auto-injector; Inject 0.5 mL under the skin into the appropriate area as directed 1 (One) Time Per Week for 30 days.  Dispense: 2 mL; Refill: 1    2. Gastroesophageal reflux disease, unspecified whether esophagitis present  Comments:  follow up with PCP    3. Drug-induced constipation        We discussed the risks, benefits, and limitations of treatments. Continue medications and OTC supplements as discussed. Patient verbalizes understanding of and agreement with management plan.     Follow Up   Return in about 4 weeks (around 7/4/2024).  Patient was given instructions and counseling regarding her condition or for health maintenance advice. Please see specific information pulled into the AVS if appropriate.     Chuyita Johnson, APRN  06/06/2024

## 2024-06-06 ENCOUNTER — OFFICE VISIT (OUTPATIENT)
Dept: BARIATRICS/WEIGHT MGMT | Facility: CLINIC | Age: 39
End: 2024-06-06
Payer: OTHER GOVERNMENT

## 2024-06-06 VITALS
HEIGHT: 68 IN | WEIGHT: 225.2 LBS | SYSTOLIC BLOOD PRESSURE: 122 MMHG | BODY MASS INDEX: 34.13 KG/M2 | DIASTOLIC BLOOD PRESSURE: 82 MMHG | HEART RATE: 93 BPM

## 2024-06-06 DIAGNOSIS — K21.9 GASTROESOPHAGEAL REFLUX DISEASE, UNSPECIFIED WHETHER ESOPHAGITIS PRESENT: ICD-10-CM

## 2024-06-06 DIAGNOSIS — K59.03 DRUG-INDUCED CONSTIPATION: ICD-10-CM

## 2024-06-06 DIAGNOSIS — E66.9 OBESITY (BMI 30-39.9): Primary | ICD-10-CM

## 2024-06-06 RX ORDER — SEMAGLUTIDE 1 MG/.5ML
1 INJECTION, SOLUTION SUBCUTANEOUS WEEKLY
Qty: 2 ML | Refills: 1 | Status: SHIPPED | OUTPATIENT
Start: 2024-06-06 | End: 2024-07-06

## 2024-06-06 NOTE — ASSESSMENT & PLAN NOTE
Patient's (Body mass index is 34.24 kg/m².) indicates that they are obese (BMI >30) with health conditions that include hypertension and dyslipidemias . Weight is improving with treatment. BMI  is above average; BMI management plan is completed. We discussed low calorie, low carb based diet program, portion control, increasing exercise, pharmacologic options including Wegovy, and an joe-based approach such as [employer] Pal or Lose It.   -- This is a follow-up visit and patient is down around 2-1/2 pounds since last being seen approximately 6 weeks  ago.  --Wegovy dose was reduced to 1 mg related to low A1c.  Continue 1 mg dose for the next 2 months and repeat A1c.  --Reports falling off track as far as food preparation and food choices.  Prioritized protein and nonstarchy vegetables and be mindful to stay at or below calorie and carb goals.  -- Reports some symptoms of reflux and constipation.  Encouraged her to continue docusate sodium and add MiraLAX daily as constipation prevention.  May use occasional as needed stimulant laxative if constipated.  If reflux symptoms continue or do not improve follow-up with primary care provider.

## 2024-06-11 ENCOUNTER — PRIOR AUTHORIZATION (OUTPATIENT)
Dept: BARIATRICS/WEIGHT MGMT | Facility: CLINIC | Age: 39
End: 2024-06-11
Payer: OTHER GOVERNMENT

## 2024-06-20 ENCOUNTER — PRIOR AUTHORIZATION (OUTPATIENT)
Dept: BARIATRICS/WEIGHT MGMT | Facility: CLINIC | Age: 39
End: 2024-06-20
Payer: OTHER GOVERNMENT

## 2024-06-24 NOTE — TELEPHONE ENCOUNTER
This request was denied.  Coverage is provided in situations where the dates and durations of therapy or contraindications have been provided for the following agents tried and failed: phentermine, Qsymia or one of its  individual generic components, and Contrave or one of its individual generic components.

## 2024-07-01 ENCOUNTER — PRIOR AUTHORIZATION (OUTPATIENT)
Dept: BARIATRICS/WEIGHT MGMT | Facility: CLINIC | Age: 39
End: 2024-07-01
Payer: OTHER GOVERNMENT

## 2024-07-01 NOTE — TELEPHONE ENCOUNTER
Last 2 prior auth questions were answered incorrectly resulting in denial. New request was submitted with corrected answers. Awaiting response.

## 2024-07-02 NOTE — TELEPHONE ENCOUNTER
Message from plan:   Case Id:[id number]  Status:Approved  Review Type:Prior Auth  Coverage Start Date:06/01/2024  Coverage End Date:07/01/2025   Size Of Lesion In Cm (Optional): 0 Detail Level: Detailed

## 2024-07-09 NOTE — PROGRESS NOTES
[facility]  [address]  [place] [address]     Office Note      Date: 2024  Patient Name: Dianna Caicedo  MRN: [id number]  : 1985    Subjective     Chief Complaint  Obesity Management follow-up    Dianna Caicedo presents to [facility] for obesity management.       Patient is unsatisfied with weight loss progress. Appetite is poorly controlled. Reports no side effects of prescribed medications today. The patient is not taking multivitamin and is not taking fish oil.  The patient is using a food journal.  The patient rates current efforts as 6 out of 10.    The patient is exercising with a FITT score of:    Frequency Intensity Time Strength Training   []   0, none []   0 []   0 [x]   0   [x]   1 (1-2x/week) [x]   1 (light) []   1 (<10 min) []   1 (1x/week)   []   2 (3-5x/week) []   2 (moderate) []   2 (10-20 min) []   2 (2x/week)   []   3 (daily) []   3 (moderately hard)  []   4 (very hard) [x]   3 (20-30 min)  []   4 (>30 min) []   3 (3-4x/week)     Review of Systems   Constitutional:  Negative for appetite change and fatigue.   Eyes:  Negative for visual disturbance.   Cardiovascular:  Negative for chest pain and palpitations.   Gastrointestinal:  Negative for constipation and indigestion.   Neurological:  Negative for light-headedness.   All other systems reviewed and are negative.    Objective   Start weight: 243.8 pounds.    Total Loss lb/%Loss of beginning body weight (BBW): -12lb/-5.3%  Change in weight since last visit: +5.8    Recent Weight History:   Wt Readings from Last 6 Encounters:   24 105 kg (231 lb)   24 102 kg (225 lb 3.2 oz)   24 104 kg (229 lb 6.4 oz)   24 105 kg (230 lb 6.4 oz)   24 103 kg (227 lb 6.4 oz)   24 102 kg (224 lb 8 oz)     Body mass index is 35.12 kg/m².   Body composition analysis completed and showed:        Measurements (in inches)  Waist  "Circumference: 44    Vital Signs:   /84 (BP Location: Left arm, Patient Position: Sitting)   Pulse 90   Ht 172.7 cm (68\")   Wt 105 kg (231 lb)   BMI 35.12 kg/m²       Physical Exam  Vitals reviewed.   Constitutional:       Appearance: She is obese. She is not ill-appearing.   Pulmonary:      Effort: Pulmonary effort is normal.   Neurological:      Mental Status: She is alert.   Psychiatric:         Attention and Perception: Attention and perception normal.         Behavior: Behavior is cooperative.      Result Review :     Common labs          12/6/2023    15:02 1/8/2024    09:06 3/6/2024    14:48   Common Labs   Glucose 77   105    BUN 14   11    Creatinine 0.81   0.69    Sodium 140   141    Potassium 3.9   3.6    Chloride 102   103    Calcium 9.1   8.6    Albumin 4.4      Total Bilirubin 0.7      Alkaline Phosphatase 54      AST (SGOT) 16      ALT (SGPT) 10      WBC 9.36   5.71    Hemoglobin 15.2   14.5    Hematocrit 44.9   41.0    Platelets 215   151    Hemoglobin A1C  4.60  4.30          Assessment / Plan        Diagnoses and all orders for this visit:    1. Obesity (BMI 30-39.9) (Primary)  Assessment & Plan:  Patient's (Body mass index is 35.12 kg/m².) indicates that they are obese (BMI >30) with health conditions that include hypertension and dyslipidemias . Weight is worsening. BMI  is above average; BMI management plan is completed. We discussed low calorie, low carb based diet program, portion control, increasing exercise, pharmacologic options including Wegovy, and an joe-based approach such as [employer] Pal or Lose It.   --This is a follow-up visit and patient is up a little over 5 pounds a little over a month ago.  --Continue Wegovy 1 mg.  She did have about a 3-week off.  Of this medication related to insurance coverage.  May consider increasing to 1.7 mg at next office visit as long as her A1c is not low.  Rechecking A1c today in office.  --With the body comp analysis and reset up Baritastic " based on this information.  Her #1 goal is to start journaling and seeing where her calories are staying again.  --Goal to make it to the gym twice a week.    Orders:  -     Semaglutide-Weight Management (Wegovy) 1 MG/0.5ML solution auto-injector; Inject 0.5 mL under the skin into the appropriate area as directed 1 (One) Time Per Week for 30 days.  Dispense: 2 mL; Refill: 1    2. Encounter for long-term (current) use of medications  -     Hemoglobin A1c    We discussed the risks, benefits, and limitations of treatments. Continue medications and OTC supplements as discussed. Patient verbalizes understanding of and agreement with management plan.     Follow Up   Return in about 4 weeks (around 8/8/2024).    Patient was given instructions and counseling regarding her condition or for health maintenance advice. Please see specific information pulled into the AVS if appropriate.     I spent 30 minutes on this date of service. This time includes time spent by me in the following activities:preparing for the visit, counseling and educating the patient/family/caregiver, ordering medications, tests, or procedures and documenting information in the medical record.    Chuyita Johnson, APRN  07/11/2024

## 2024-07-11 ENCOUNTER — OFFICE VISIT (OUTPATIENT)
Dept: BARIATRICS/WEIGHT MGMT | Facility: CLINIC | Age: 39
End: 2024-07-11
Payer: OTHER GOVERNMENT

## 2024-07-11 VITALS
SYSTOLIC BLOOD PRESSURE: 128 MMHG | HEART RATE: 90 BPM | WEIGHT: 231 LBS | BODY MASS INDEX: 35.01 KG/M2 | DIASTOLIC BLOOD PRESSURE: 84 MMHG | HEIGHT: 68 IN

## 2024-07-11 DIAGNOSIS — Z79.899 ENCOUNTER FOR LONG-TERM (CURRENT) USE OF MEDICATIONS: ICD-10-CM

## 2024-07-11 DIAGNOSIS — E66.9 OBESITY (BMI 30-39.9): Primary | ICD-10-CM

## 2024-07-11 PROCEDURE — 99214 OFFICE O/P EST MOD 30 MIN: CPT | Performed by: NURSE PRACTITIONER

## 2024-07-11 RX ORDER — SEMAGLUTIDE 1 MG/.5ML
1 INJECTION, SOLUTION SUBCUTANEOUS WEEKLY
Qty: 2 ML | Refills: 1 | Status: SHIPPED | OUTPATIENT
Start: 2024-07-11 | End: 2024-08-10

## 2024-07-11 NOTE — ASSESSMENT & PLAN NOTE
Patient's (Body mass index is 35.12 kg/m².) indicates that they are obese (BMI >30) with health conditions that include hypertension and dyslipidemias . Weight is worsening. BMI  is above average; BMI management plan is completed. We discussed low calorie, low carb based diet program, portion control, increasing exercise, pharmacologic options including Wegovy, and an joe-based approach such as [employer] Pal or Lose It.   --This is a follow-up visit and patient is up a little over 5 pounds a little over a month ago.  --Continue Wegovy 1 mg.  She did have about a 3-week off.  Of this medication related to insurance coverage.  May consider increasing to 1.7 mg at next office visit as long as her A1c is not low.  Rechecking A1c today in office.  --With the body comp analysis and reset up Baritastic based on this information.  Her #1 goal is to start journaling and seeing where her calories are staying again.  --Goal to make it to the gym twice a week.    --Addendum 7/13/2024: labs show low A1C, not able to titrate up on dose of Wegovy and consider a reduction.

## 2024-07-12 LAB — HBA1C MFR BLD: 4.8 % (ref 4.8–5.6)

## 2024-07-30 DIAGNOSIS — E66.9 OBESITY (BMI 30-39.9): ICD-10-CM

## 2024-07-30 RX ORDER — SEMAGLUTIDE 1 MG/.5ML
1 INJECTION, SOLUTION SUBCUTANEOUS WEEKLY
Qty: 2 ML | Refills: 1 | Status: CANCELLED | OUTPATIENT
Start: 2024-07-30 | End: 2024-08-29

## 2024-08-03 RX ORDER — SEMAGLUTIDE 1.7 MG/.75ML
1.7 INJECTION, SOLUTION SUBCUTANEOUS WEEKLY
Qty: 3 ML | Refills: 1 | Status: SHIPPED | OUTPATIENT
Start: 2024-08-03 | End: 2024-09-02

## 2024-08-13 ENCOUNTER — OFFICE VISIT (OUTPATIENT)
Dept: BARIATRICS/WEIGHT MGMT | Facility: CLINIC | Age: 39
End: 2024-08-13
Payer: OTHER GOVERNMENT

## 2024-08-13 VITALS
HEART RATE: 81 BPM | OXYGEN SATURATION: 99 % | WEIGHT: 232.8 LBS | DIASTOLIC BLOOD PRESSURE: 68 MMHG | RESPIRATION RATE: 18 BRPM | BODY MASS INDEX: 35.28 KG/M2 | HEIGHT: 68 IN | SYSTOLIC BLOOD PRESSURE: 122 MMHG

## 2024-08-13 DIAGNOSIS — Z79.899 ENCOUNTER FOR LONG-TERM CURRENT USE OF MEDICATION: ICD-10-CM

## 2024-08-13 DIAGNOSIS — E66.9 OBESITY (BMI 30-39.9): Primary | ICD-10-CM

## 2024-08-13 PROCEDURE — 99215 OFFICE O/P EST HI 40 MIN: CPT | Performed by: NURSE PRACTITIONER

## 2024-08-13 RX ORDER — PHENTERMINE AND TOPIRAMATE 7.5; 46 MG/1; MG/1
1 CAPSULE, EXTENDED RELEASE ORAL DAILY
Qty: 30 CAPSULE | Refills: 0 | Status: SHIPPED | OUTPATIENT
Start: 2024-08-13 | End: 2024-09-12

## 2024-08-13 RX ORDER — SEMAGLUTIDE 1.7 MG/.75ML
1.7 INJECTION, SOLUTION SUBCUTANEOUS WEEKLY
Qty: 3 ML | Refills: 1 | Status: SHIPPED | OUTPATIENT
Start: 2024-08-13 | End: 2024-09-12

## 2024-08-13 RX ORDER — PHENTERMINE AND TOPIRAMATE 3.75; 23 MG/1; MG/1
1 CAPSULE, EXTENDED RELEASE ORAL DAILY
Qty: 14 CAPSULE | Refills: 0 | Status: SHIPPED | OUTPATIENT
Start: 2024-08-13 | End: 2024-08-27

## 2024-08-13 NOTE — ASSESSMENT & PLAN NOTE
Patient's (Body mass index is 35.4 kg/m².) indicates that they are obese (BMI >30) with health conditions that include hypertension and dyslipidemias . Weight is improving with treatment. BMI  is above average; BMI management plan is completed. We discussed low calorie, low carb based diet program, portion control, increasing exercise, pharmacologic options including Wegovy and qsymia, and an joe-based approach such as [employer] Glass Pal or Lose It.   -- This is a follow-up visit and patient is up around 1.8 pounds since last being seen approximately a month ago.  --Currently journaling around 1 week out of the month and they are going to make that are #1 goal this month to refocus on journaling.  Our goal is to hit all days this month all meals.  Adjusted calories based on basal metabolic rate.  --Based on her A1c we cannot titrate up any further on the Wegovy currently.  Staying at 1.7 mg dose.  Discussed alternative medications to add in addition.  In the past she has not tolerated higher dose of phentermine so mutually agreed on Qsymia with a lower extended release dose.  --UDS completed in office today

## 2024-08-13 NOTE — PROGRESS NOTES
[facility]  [address]  [place] [address]     Office Note      Date: 2024  Patient Name: Dianna Caicedo  MRN: [id number]  : 1985    Subjective     Chief Complaint  Obesity Management follow-up    Dianna Caicedo presents to [facility] for obesity management.       Patient is satisfied with weight loss progress. Appetite is moderately controlled. Reports no side effects of prescribed medications today. The patient is taking multivitamin and is not taking fish oil.  The patient is using a food journal.  The patient rates current efforts as 7 out of 10. She has decreased journaling days, hitting around 10/30 days.     The patient is exercising with a FITT score of:    Frequency Intensity Time Strength Training   []   0, none []   0 []   0 []   0   [x]   1 (1-2x/week) []   1 (light) []   1 (<10 min) []   1 (1x/week)   []   2 (3-5x/week) [x]   2 (moderate) []   2 (10-20 min) [x]   2 (2x/week)   []   3 (daily) []   3 (moderately hard)  []   4 (very hard) []   3 (20-30 min)  [x]   4 (>30 min) []   3 (3-4x/week)     Review of Systems   Constitutional:  Negative for appetite change and fatigue.   Eyes:  Negative for visual disturbance.   Cardiovascular:  Negative for chest pain and palpitations.   Gastrointestinal:  Negative for constipation and indigestion.   Neurological:  Negative for light-headedness.       Objective   Start weight: 243.8 pounds.    Total Loss lb/%Loss of beginning body weight (BBW): -11lb/-4.51%  Change in weight since last visit: +1.8    Recent Weight History:   Wt Readings from Last 6 Encounters:   24 106 kg (232 lb 12.8 oz)   24 105 kg (231 lb)   24 102 kg (225 lb 3.2 oz)   24 104 kg (229 lb 6.4 oz)   24 105 kg (230 lb 6.4 oz)   24 103 kg (227 lb 6.4 oz)       Body mass index is 35.4 kg/m².   Body composition analysis completed and showed:   Body Fat %:  "45.6%    Measurements (in inches)  Waist Circumference: 44    Vital Signs:   /68   Pulse 81   Resp 18   Ht 172.7 cm (68\")   Wt 106 kg (232 lb 12.8 oz)   SpO2 99%   BMI 35.40 kg/m²          Result Review :     Common labs          1/8/2024    09:06 3/6/2024    14:48 7/11/2024    15:01   Common Labs   Glucose  105     BUN  11     Creatinine  0.69     Sodium  141     Potassium  3.6     Chloride  103     Calcium  8.6     WBC  5.71     Hemoglobin  14.5     Hematocrit  41.0     Platelets  151     Hemoglobin A1C 4.60  4.30  4.8          Assessment / Plan        Diagnoses and all orders for this visit:    1. Obesity (BMI 30-39.9) (Primary)  Assessment & Plan:  Patient's (Body mass index is 35.4 kg/m².) indicates that they are obese (BMI >30) with health conditions that include hypertension and dyslipidemias . Weight is improving with treatment. BMI  is above average; BMI management plan is completed. We discussed low calorie, low carb based diet program, portion control, increasing exercise, pharmacologic options including Wegovy and qsymia, and an joe-based approach such as [employer] Pal or Lose It.   -- This is a follow-up visit and patient is up around 1.8 pounds since last being seen approximately a month ago.  --Currently journaling around 1 week out of the month and they are going to make that are #1 goal this month to refocus on journaling.  Our goal is to hit all days this month all meals.  Adjusted calories based on basal metabolic rate.  --Based on her A1c we cannot titrate up any further on the Wegovy currently.  Staying at 1.7 mg dose.  Discussed alternative medications to add in addition.  In the past she has not tolerated higher dose of phentermine so mutually agreed on Qsymia with a lower extended release dose.  --UDS completed in office today    Orders:  -     Semaglutide-Weight Management (Wegovy) 1.7 MG/0.75ML solution auto-injector; Inject 0.75 mL under the skin into the appropriate area as " directed 1 (One) Time Per Week for 30 days.  Dispense: 3 mL; Refill: 1  -     Qsymia 3.75-23 MG capsule sustained-release 24 hr; Take 1 capsule by mouth Daily for 14 days.  Dispense: 14 capsule; Refill: 0  -     Qsymia 7.5-46 MG capsule sustained-release 24 hr; Take 1 capsule by mouth Daily for 30 days.  Dispense: 30 capsule; Refill: 0    2. Encounter for long-term current use of medication  -     Urine Drug Screen - Urine, Clean Catch        We discussed the risks, benefits, and limitations of treatments. Continue medications and OTC supplements as discussed. Patient verbalizes understanding of and agreement with management plan.     Follow Up   Return in about 4 weeks (around 9/10/2024).  Patient was given instructions and counseling regarding her condition or for health maintenance advice. Please see specific information pulled into the AVS if appropriate.     I spent 40 minutes on this date of service. This time includes time spent by me in the following activities:preparing for the visit, counseling and educating the patient/family/caregiver, ordering medications, tests, or procedures and documenting information in the medical record.    Chuyita Johnson, APRN  08/13/2024

## 2024-08-14 LAB
AMPHETAMINES UR QL SCN: NEGATIVE NG/ML
BARBITURATES UR QL SCN: NEGATIVE NG/ML
BENZODIAZ UR QL SCN: NEGATIVE NG/ML
BZE UR QL SCN: NEGATIVE NG/ML
CANNABINOIDS UR QL SCN: NEGATIVE NG/ML
CREAT UR-MCNC: 53.2 MG/DL (ref 20–300)
LABORATORY COMMENT REPORT: NORMAL
METHADONE UR QL SCN: NEGATIVE NG/ML
OPIATES UR QL SCN: NEGATIVE NG/ML
OXYCODONE+OXYMORPHONE UR QL SCN: NEGATIVE NG/ML
PCP UR QL: NEGATIVE NG/ML
PH UR: 6.5 [PH] (ref 4.5–8.9)
PROPOXYPH UR QL SCN: NEGATIVE NG/ML

## 2024-08-22 ENCOUNTER — TELEPHONE (OUTPATIENT)
Dept: FAMILY MEDICINE CLINIC | Facility: CLINIC | Age: 39
End: 2024-08-22
Payer: OTHER GOVERNMENT

## 2024-08-22 NOTE — TELEPHONE ENCOUNTER
Caller: Dianna Caicedo    Relationship: Self    Best call back number: [phone]    Requested Prescriptions:   REQUESTING  Z-PAC       Pharmacy where request should be sent: [facility] - [phone]  - [phone] FX     Last office visit with prescribing clinician: 8/18/2023   Last telemedicine visit with prescribing clinician: Visit date not found   Next office visit with prescribing clinician: Visit date not found     Additional details provided by patient: PATIENT STARTED ON 8/16/24 WITH THE FOLLOWING SYMPTOMS ALLERGIES, STUFFY HEAD. PATIENT STARTED ON TUESDAY 8/20/24 WITH COUGH AND CONGESTION. HER SON IS SICK WITH THE SAME SYMPTOMS AND THEY ARE WAITING FOR HIS TEST RESULTS TO COME IN. PATIENT SON WAS PRESCRIBE A Z- PAC WHILE THEY WAIT      Would you like a call back once the refill request has been completed: [x] Yes     If the office needs to give you a call back, can they leave a voicemail: [x] Yes     Dorothy Strickland MA   08/22/24 15:18 EDT

## 2024-09-16 ENCOUNTER — OFFICE VISIT (OUTPATIENT)
Dept: BARIATRICS/WEIGHT MGMT | Facility: CLINIC | Age: 39
End: 2024-09-16
Payer: OTHER GOVERNMENT

## 2024-09-16 VITALS
DIASTOLIC BLOOD PRESSURE: 80 MMHG | WEIGHT: 227 LBS | HEIGHT: 68 IN | HEART RATE: 101 BPM | SYSTOLIC BLOOD PRESSURE: 122 MMHG | BODY MASS INDEX: 34.4 KG/M2

## 2024-09-16 DIAGNOSIS — E66.9 OBESITY (BMI 30-39.9): Primary | ICD-10-CM

## 2024-09-16 PROCEDURE — 99215 OFFICE O/P EST HI 40 MIN: CPT | Performed by: NURSE PRACTITIONER

## 2024-09-16 RX ORDER — PHENTERMINE AND TOPIRAMATE 7.5; 46 MG/1; MG/1
1 CAPSULE, EXTENDED RELEASE ORAL DAILY
Qty: 30 CAPSULE | Refills: 0 | Status: SHIPPED | OUTPATIENT
Start: 2024-09-16 | End: 2024-10-16

## 2024-09-16 RX ORDER — SEMAGLUTIDE 1.7 MG/.75ML
1.7 INJECTION, SOLUTION SUBCUTANEOUS WEEKLY
Qty: 3 ML | Refills: 1 | Status: SHIPPED | OUTPATIENT
Start: 2024-09-16 | End: 2024-10-16

## 2024-10-07 ENCOUNTER — OFFICE VISIT (OUTPATIENT)
Dept: BARIATRICS/WEIGHT MGMT | Facility: CLINIC | Age: 39
End: 2024-10-07
Payer: OTHER GOVERNMENT

## 2024-10-07 VITALS
HEART RATE: 88 BPM | BODY MASS INDEX: 34.17 KG/M2 | OXYGEN SATURATION: 97 % | WEIGHT: 225.5 LBS | HEIGHT: 68 IN | TEMPERATURE: 98.4 F | DIASTOLIC BLOOD PRESSURE: 88 MMHG | RESPIRATION RATE: 20 BRPM | SYSTOLIC BLOOD PRESSURE: 126 MMHG

## 2024-10-07 DIAGNOSIS — E66.9 OBESITY (BMI 30-39.9): Primary | ICD-10-CM

## 2024-10-07 DIAGNOSIS — R79.0 ABNORMAL BLOOD LEVEL OF IRON: ICD-10-CM

## 2024-10-07 DIAGNOSIS — K21.9 GASTROESOPHAGEAL REFLUX DISEASE, UNSPECIFIED WHETHER ESOPHAGITIS PRESENT: ICD-10-CM

## 2024-10-07 DIAGNOSIS — R79.89 LOW VITAMIN D LEVEL: ICD-10-CM

## 2024-10-07 DIAGNOSIS — E53.8 FOLATE DEFICIENCY: ICD-10-CM

## 2024-10-07 DIAGNOSIS — R53.83 FATIGUE, UNSPECIFIED TYPE: ICD-10-CM

## 2024-10-07 PROCEDURE — 99214 OFFICE O/P EST MOD 30 MIN: CPT | Performed by: PHYSICIAN ASSISTANT

## 2024-10-07 RX ORDER — OMEPRAZOLE 40 MG/1
40 CAPSULE, DELAYED RELEASE ORAL DAILY
Qty: 90 CAPSULE | Refills: 3 | Status: SHIPPED | OUTPATIENT
Start: 2024-10-07
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
## 2024-10-08 ENCOUNTER — TELEPHONE (OUTPATIENT)
Dept: BARIATRICS/WEIGHT MGMT | Facility: CLINIC | Age: 39
End: 2024-10-08
Payer: OTHER GOVERNMENT

## 2024-10-14 LAB
25(OH)D3+25(OH)D2 SERPL-MCNC: 30.7 NG/ML (ref 30–100)
ALBUMIN SERPL-MCNC: 4.5 G/DL (ref 3.9–4.9)
ALP SERPL-CCNC: 51 IU/L (ref 44–121)
ALT SERPL-CCNC: 8 IU/L (ref 0–32)
AST SERPL-CCNC: 11 IU/L (ref 0–40)
BASOPHILS # BLD AUTO: 0 X10E3/UL (ref 0–0.2)
BASOPHILS NFR BLD AUTO: 0 %
BILIRUB SERPL-MCNC: 1 MG/DL (ref 0–1.2)
BUN SERPL-MCNC: 10 MG/DL (ref 6–20)
BUN/CREAT SERPL: 11 (ref 9–23)
CALCIUM SERPL-MCNC: 9.3 MG/DL (ref 8.7–10.2)
CHLORIDE SERPL-SCNC: 103 MMOL/L (ref 96–106)
CO2 SERPL-SCNC: 23 MMOL/L (ref 20–29)
CREAT SERPL-MCNC: 0.95 MG/DL (ref 0.57–1)
EGFRCR SERPLBLD CKD-EPI 2021: 79 ML/MIN/1.73
EOSINOPHIL # BLD AUTO: 0 X10E3/UL (ref 0–0.4)
EOSINOPHIL NFR BLD AUTO: 0 %
ERYTHROCYTE [DISTWIDTH] IN BLOOD BY AUTOMATED COUNT: 12.5 % (ref 11.7–15.4)
FERRITIN SERPL-MCNC: 71 NG/ML (ref 15–150)
FOLATE SERPL-MCNC: 3.2 NG/ML
GLOBULIN SER CALC-MCNC: 2.5 G/DL (ref 1.5–4.5)
GLUCOSE SERPL-MCNC: 83 MG/DL (ref 70–99)
HCT VFR BLD AUTO: 47.9 % (ref 34–46.6)
HGB BLD-MCNC: 16 G/DL (ref 11.1–15.9)
IMM GRANULOCYTES # BLD AUTO: 0 X10E3/UL (ref 0–0.1)
IMM GRANULOCYTES NFR BLD AUTO: 0 %
IRON SERPL-MCNC: 94 UG/DL (ref 27–159)
LYMPHOCYTES # BLD AUTO: 1.7 X10E3/UL (ref 0.7–3.1)
LYMPHOCYTES NFR BLD AUTO: 23 %
MCH RBC QN AUTO: 30 PG (ref 26.6–33)
MCHC RBC AUTO-ENTMCNC: 33.4 G/DL (ref 31.5–35.7)
MCV RBC AUTO: 90 FL (ref 79–97)
METHYLMALONATE SERPL-SCNC: 128 NMOL/L (ref 0–378)
MONOCYTES # BLD AUTO: 0.4 X10E3/UL (ref 0.1–0.9)
MONOCYTES NFR BLD AUTO: 5 %
NEUTROPHILS # BLD AUTO: 5.4 X10E3/UL (ref 1.4–7)
NEUTROPHILS NFR BLD AUTO: 72 %
PLATELET # BLD AUTO: 245 X10E3/UL (ref 150–450)
POTASSIUM SERPL-SCNC: 4.4 MMOL/L (ref 3.5–5.2)
PREALB SERPL-MCNC: 27 MG/DL (ref 14–35)
PROT SERPL-MCNC: 7 G/DL (ref 6–8.5)
RBC # BLD AUTO: 5.33 X10E6/UL (ref 3.77–5.28)
SODIUM SERPL-SCNC: 138 MMOL/L (ref 134–144)
VIT B1 BLD-SCNC: 141.5 NMOL/L (ref 66.5–200)
WBC # BLD AUTO: 7.6 X10E3/UL (ref 3.4–10.8)

## 2024-10-14 NOTE — PROGRESS NOTES
[facility]  [address]  [place] [address]     Office Note      Date: 10/17/2024  Patient Name: Dianna Caicedo  MRN: [id number]  : 1985    Subjective     Chief Complaint  Obesity Management follow-up    Dianna Caicedo presents to [facility] for obesity management.     Patient is satisfied with weight loss progress. Appetite is moderately controlled. Reports no side effects of prescribed medications today. The patient is not taking multivitamin and is not taking fish oil.  The patient is using a food journal.  The patient rates current efforts as 7 out of 10. She is currently getting Wegovy 1.7 mg and Qsymia.  Feels that the Qsymia has been very helpful since starting a couple months ago.    B: protein coffee (first thing upon waking up)  S: cheese stick or cheese and cracker  L: chicken salad and crackers, with vegetables.   S: protein bar  D:chili     The patient is exercising with a FITT score of:    Frequency Intensity Time Strength Training   []   0, none []   0 []   0 [x]   0   [x]   1 (1-2x/week) [x]   1 (light) []   1 (<10 min) []   1 (1x/week)   []   2 (3-5x/week) []   2 (moderate) []   2 (10-20 min) []   2 (2x/week)   []   3 (daily) []   3 (moderately hard)  []   4 (very hard) [x]   3 (20-30 min)  []   4 (>30 min) []   3 (3-4x/week)     Review of Systems   Constitutional:  Negative for appetite change and fatigue.   Eyes:  Negative for visual disturbance.   Cardiovascular:  Negative for chest pain and palpitations.   Gastrointestinal:  Positive for abdominal distention (having regular bowels), nausea (not new, feels related to no gullbladder) and indigestion (having a GI workup from bariatrics). Negative for abdominal pain and constipation.   Neurological:  Negative for light-headedness.   All other systems reviewed and are negative.      Objective   Start weight: 243.8 pounds.    Total Loss lb/%Loss of  "beginning body weight (BBW): -21.8 lb/-8.94 %  Change in weight since last visit: -5.0 lb    Recent Weight History:   Wt Readings from Last 6 Encounters:   10/17/24 101 kg (222 lb)   10/07/24 102 kg (225 lb 8 oz)   09/16/24 103 kg (227 lb)   08/13/24 106 kg (232 lb 12.8 oz)   07/11/24 105 kg (231 lb)   06/06/24 102 kg (225 lb 3.2 oz)     Body mass index is 33.75 kg/m².   Body composition analysis completed and showed:   Body Fat %: 46.3    Measurements (in inches)  Waist Circumference: 45    Vital Signs:   /77 (BP Location: Right arm, Patient Position: Sitting)   Pulse 80   Ht 172.7 cm (68\")   Wt 101 kg (222 lb)   BMI 33.75 kg/m²       Physical Exam  Vitals reviewed.   Constitutional:       Appearance: She is obese. She is not ill-appearing.   Pulmonary:      Effort: Pulmonary effort is normal.   Neurological:      Mental Status: She is alert.   Psychiatric:         Attention and Perception: Attention and perception normal.         Behavior: Behavior is cooperative.        Result Review :     Common labs          3/6/2024    14:48 7/11/2024    15:01 10/7/2024    09:17   Common Labs   Glucose 105   83    BUN 11   10    Creatinine 0.69   0.95    Sodium 141   138    Potassium 3.6   4.4    Chloride 103   103    Calcium 8.6   9.3    Total Protein   7.0    Albumin   4.5    Total Bilirubin   1.0    Alkaline Phosphatase   51    AST (SGOT)   11    ALT (SGPT)   8    WBC 5.71   7.6    Hemoglobin 14.5   16.0    Hematocrit 41.0   47.9    Platelets 151   245    Hemoglobin A1C 4.30  4.8           Assessment / Plan        Diagnoses and all orders for this visit:    1. Obesity (BMI 30-39.9) (Primary)  Assessment & Plan:  Patient's (Body mass index is 33.75 kg/m².) indicates that they are obese (BMI >30) with health conditions that include obstructive sleep apnea . Weight is improving with treatment. BMI  is above average; BMI management plan is completed. We discussed low calorie, low carb based diet program, portion " control, increasing exercise, pharmacologic options including wegovy and qsymia, and an joe-based approach such as [employer] Pal or Lose It.   -- The follow-up visit patient is down around 5 pounds since last being seen about a month ago.  --Her main goals was to increase food journaling which she has and did bring in for review.  But this great effort this will continue to be her #1 goal is coming month.  Another food goal is to keep protein levels high which she has been but start to incorporate nonstarchy vegetables a bit more into diet.  --Continue Wegovy 1.7 mg and Qsymia.  We do not increase to higher levels of Wegovy related to previous low A1c's.  --A1c today.  -- 5 tended to be a set point for her and she is below that weighing currently 222 pounds.  It is approximately 9% down from her original start weight of 243.8.  Goal to be over 10% down by next office visit.    Orders:  -     Semaglutide-Weight Management (Wegovy) 1.7 MG/0.75ML solution auto-injector; Inject 0.75 mL under the skin into the appropriate area as directed 1 (One) Time Per Week for 30 days.  Dispense: 3 mL; Refill: 1  -     Qsymia 7.5-46 MG capsule sustained-release 24 hr; Take 1 capsule by mouth Daily for 30 days.  Dispense: 30 capsule; Refill: 0  -     Hemoglobin A1c    2. Encounter for long-term current use of medication  -     Hemoglobin A1c        We discussed the risks, benefits, and limitations of treatments. Continue medications and OTC supplements as discussed. Patient verbalizes understanding of and agreement with management plan.     Follow Up   Return in about 4 weeks (around 11/14/2024).  Patient was given instructions and counseling regarding her condition or for health maintenance advice. Please see specific information pulled into the AVS if appropriate.     I spent 30 minutes on this date of service. This time includes time spent by me in the following activities:preparing for the visit, counseling and educating the  patient/family/caregiver, ordering medications, tests, or procedures and documenting information in the medical record.    Chuyita Johnson, APRN  10/17/2024

## 2024-10-17 ENCOUNTER — HOSPITAL ENCOUNTER (OUTPATIENT)
Dept: GENERAL RADIOLOGY | Facility: HOSPITAL | Age: 39
Discharge: HOME | End: 2024-10-17
Admitting: PHYSICIAN ASSISTANT
Payer: OTHER GOVERNMENT

## 2024-10-17 ENCOUNTER — OFFICE VISIT (OUTPATIENT)
Dept: BARIATRICS/WEIGHT MGMT | Facility: CLINIC | Age: 39
End: 2024-10-17
Payer: OTHER GOVERNMENT

## 2024-10-17 VITALS
HEIGHT: 68 IN | WEIGHT: 222 LBS | HEART RATE: 80 BPM | SYSTOLIC BLOOD PRESSURE: 137 MMHG | DIASTOLIC BLOOD PRESSURE: 77 MMHG | BODY MASS INDEX: 33.65 KG/M2

## 2024-10-17 DIAGNOSIS — E66.9 OBESITY (BMI 30-39.9): Primary | ICD-10-CM

## 2024-10-17 DIAGNOSIS — Z79.899 ENCOUNTER FOR LONG-TERM CURRENT USE OF MEDICATION: ICD-10-CM

## 2024-10-17 DIAGNOSIS — K21.9 GASTROESOPHAGEAL REFLUX DISEASE, UNSPECIFIED WHETHER ESOPHAGITIS PRESENT: ICD-10-CM

## 2024-10-17 PROCEDURE — 99214 OFFICE O/P EST MOD 30 MIN: CPT | Performed by: NURSE PRACTITIONER

## 2024-10-17 PROCEDURE — 74240 X-RAY XM UPR GI TRC 1CNTRST: CPT

## 2024-10-17 RX ORDER — PHENTERMINE AND TOPIRAMATE 7.5; 46 MG/1; MG/1
1 CAPSULE, EXTENDED RELEASE ORAL DAILY
Qty: 30 CAPSULE | Refills: 0 | Status: SHIPPED | OUTPATIENT
Start: 2024-10-17 | End: 2024-11-16

## 2024-10-17 RX ORDER — SEMAGLUTIDE 1.7 MG/.75ML
1.7 INJECTION, SOLUTION SUBCUTANEOUS WEEKLY
Qty: 3 ML | Refills: 1 | Status: SHIPPED | OUTPATIENT
Start: 2024-10-17 | End: 2024-11-16

## 2024-10-17 RX ADMIN — BARIUM SULFATE 183 ML: 960 POWDER, FOR SUSPENSION ORAL at 11:48

## 2024-10-17 NOTE — ASSESSMENT & PLAN NOTE
Patient's (Body mass index is 33.75 kg/m².) indicates that they are obese (BMI >30) with health conditions that include obstructive sleep apnea . Weight is improving with treatment. BMI  is above average; BMI management plan is completed. We discussed low calorie, low carb based diet program, portion control, increasing exercise, pharmacologic options including wegovy and qsymia, and an joe-based approach such as [employer] Pal or Lose It.   -- The follow-up visit patient is down around 5 pounds since last being seen about a month ago.  --Her main goals was to increase food journaling which she has and did bring in for review.  But this great effort this will continue to be her #1 goal is coming month.  Another food goal is to keep protein levels high which she has been but start to incorporate nonstarchy vegetables a bit more into diet.  --Continue Wegovy 1.7 mg and Qsymia.  We do not increase to higher levels of Wegovy related to previous low A1c's.  --A1c today.  -- 5 tended to be a set point for her and she is below that weighing currently 222 pounds.  It is approximately 9% down from her original start weight of 243.8.  Goal to be over 10% down by next office visit.  --Addendum 10/20/2024: A1C remains low, consider increasing carbs or decreasing Wegovy   denies

## 2024-10-18 LAB — HBA1C MFR BLD: 4.3 % (ref 4.8–5.6)

## 2024-11-15 ENCOUNTER — TELEPHONE (OUTPATIENT)
Dept: BARIATRICS/WEIGHT MGMT | Facility: CLINIC | Age: 39
End: 2024-11-15
Payer: OTHER GOVERNMENT

## 2024-11-15 NOTE — TELEPHONE ENCOUNTER
"----- Message from Maurice Kyrie sent at 11/14/2024 12:58 PM EST -----    Yes, EGD, let her know we have seen a lot of exacerbation of symptoms similar to hers on GLP-1's and that she may be able to avoid further surgical intervention if her symptoms can be controlled with medications off of GLP-1's.  Thanks!    ----- Message -----  From: Silvia Ying PA  Sent: 11/14/2024  11:04 AM EST  To: Maurice Mittal MD    Please advise:     s/p LSG/HHR/lisa 10/13/22 GDW    Saw recently in office for Annual Eval.      Reports heartburn, new issue, despite daily PPI.  Has tried taking extra dose in the evening but this does not help. Can be aggravated with just water.  Also c/o episodic dysphagia/ \"choking\" since having URI couple months ago.  Denies nausea or abd pain.    Of note, is taking Wegovy w/ MWM, symptoms do correlate w/ dose increase over the last few months, but she wished to further evaluate.     [facility]I 10/17/24 @[facility] - Impression:  1. Status post vertical sleeve gastrectomy x2 years. There was no evidence of extraluminal contrast. No postoperative strictures are seen.  2. Mild gastroesophageal reflux to the level of the thoracic inlet  3. Mild esophageal dysmotility  4. Small sized sliding-type hiatal hernia    Would you advise EGD next + conversation re: potential med side effects?  Thanks!    "

## 2024-11-18 NOTE — PROGRESS NOTES
[facility]  [address]  [place] [address]     Office Note      Date: 2024  Patient Name: Dianna Caicedo  MRN: [id number]  : 1985    Subjective     Chief Complaint  Obesity Management follow-up    Dianna Caicedo presents to [facility] for obesity management.     Patient is satisfied with weight loss progress. Appetite is moderately controlled. Reports no side effects of prescribed medications today. The patient is not taking multivitamin and is not taking fish oil.  The patient is not using a food journal.  The patient rates current efforts as 6 out of 10.    The patient is exercising with a FITT score of:    Frequency Intensity Time Strength Training   []   0, none []   0 []   0 [x]   0   [x]   1 (1-2x/week) [x]   1 (light) []   1 (<10 min) []   1 (1x/week)   []   2 (3-5x/week) []   2 (moderate) []   2 (10-20 min) []   2 (2x/week)   []   3 (daily) []   3 (moderately hard)  []   4 (very hard) [x]   3 (20-30 min)  []   4 (>30 min) []   3 (3-4x/week)     Review of Systems   Constitutional:  Negative for appetite change and fatigue.   Eyes:  Negative for visual disturbance.   Cardiovascular:  Negative for chest pain and palpitations.   Gastrointestinal:  Negative for constipation and indigestion.   Neurological:  Negative for light-headedness.   All other systems reviewed and are negative.      Objective   Start weight: 243.8 pounds.    Total Loss lb/%Loss of beginning body weight (BBW): -25.6lb/-10.5%  Change in weight since last visit: -3.8    Recent Weight History:   Wt Readings from Last 6 Encounters:   24 99 kg (218 lb 3.2 oz)   24 101 kg (222 lb 12.8 oz)   10/17/24 101 kg (222 lb)   10/07/24 102 kg (225 lb 8 oz)   24 103 kg (227 lb)   24 106 kg (232 lb 12.8 oz)     Body mass index is 33.18 kg/m².   Body composition analysis completed and showed:   Body Fat %: 46    Measurements (in  "inches)  Waist Circumference: 43    Vital Signs:   /78 (BP Location: Left arm, Patient Position: Sitting)   Pulse 80   Ht 172.7 cm (68\")   Wt 99 kg (218 lb 3.2 oz)   BMI 33.18 kg/m²       Physical Exam  Vitals reviewed.   Constitutional:       Appearance: She is obese. She is not ill-appearing.   Pulmonary:      Effort: Pulmonary effort is normal.   Neurological:      Mental Status: She is alert.   Psychiatric:         Attention and Perception: Attention and perception normal.         Behavior: Behavior is cooperative.      Result Review :     Common labs          7/11/2024    15:01 10/7/2024    09:17 10/17/2024    09:38   Common Labs   Glucose  83     BUN  10     Creatinine  0.95     Sodium  138     Potassium  4.4     Chloride  103     Calcium  9.3     Total Protein  7.0     Albumin  4.5     Total Bilirubin  1.0     Alkaline Phosphatase  51     AST (SGOT)  11     ALT (SGPT)  8     WBC  7.6     Hemoglobin  16.0     Hematocrit  47.9     Platelets  245     Hemoglobin A1C 4.8   4.30          Assessment / Plan        Diagnoses and all orders for this visit:    1. Obesity (BMI 30-39.9) (Primary)  Assessment & Plan:  Patient's (Body mass index is 33.18 kg/m².) indicates that they are obese (BMI >30) with health conditions that include hypertension and dyslipidemias . Weight is improving with treatment. BMI  is above average; BMI management plan is completed. We discussed low calorie, low carb based diet program, portion control, increasing exercise, pharmacologic options including qsymia and wegovy, and an joe-based approach such as [employer] Pal or Lose It.   --This is a follow up visit and she is down around 3.8lbs  --Continue qsymia. Reduced Wegovy from 1.7 to 1mg related to low A1C, will repeat in 2months  --Gaol to mindful of eating regularly.   -- Patient her first goal of 10% weight loss from her original start weight of 243.8.  Currently weighing 218.2.  Second goal we had set was being below 200 so goal " is 199 pounds.  --Goal 4-6 lbs weight loss this month    Orders:  -     Qsymia 7.5-46 MG capsule sustained-release 24 hr; Take 1 capsule by mouth Daily for 30 days.  Dispense: 30 capsule; Refill: 0  -     Semaglutide-Weight Management (Wegovy) 1 MG/0.5ML solution auto-injector; Inject 0.5 mL under the skin into the appropriate area as directed 1 (One) Time Per Week for 30 days.  Dispense: 2 mL; Refill: 1    We discussed the risks, benefits, and limitations of treatments. Continue medications and OTC supplements as discussed. Patient verbalizes understanding of and agreement with management plan.     Follow Up   Return in about 4 weeks (around 12/19/2024).  Patient was given instructions and counseling regarding her condition or for health maintenance advice. Please see specific information pulled into the AVS if appropriate.     I spent 30 minutes on this date of service. This time includes time spent by me in the following activities:preparing for the visit, counseling and educating the patient/family/caregiver, ordering medications, tests, or procedures and documenting information in the medical record.    Chuyita Johnson, APRN  11/21/2024

## 2024-11-20 ENCOUNTER — OFFICE VISIT (OUTPATIENT)
Dept: CARDIOLOGY | Facility: CLINIC | Age: 39
End: 2024-11-20
Payer: OTHER GOVERNMENT

## 2024-11-20 VITALS
OXYGEN SATURATION: 99 % | HEIGHT: 68 IN | DIASTOLIC BLOOD PRESSURE: 90 MMHG | SYSTOLIC BLOOD PRESSURE: 124 MMHG | HEART RATE: 107 BPM | BODY MASS INDEX: 33.77 KG/M2 | WEIGHT: 222.8 LBS

## 2024-11-20 DIAGNOSIS — R55 SYNCOPE AND COLLAPSE: Primary | ICD-10-CM

## 2024-11-20 PROBLEM — I10 ESSENTIAL HYPERTENSION: Status: RESOLVED | Noted: 2020-02-28 | Resolved: 2024-11-20

## 2024-11-20 PROCEDURE — 99213 OFFICE O/P EST LOW 20 MIN: CPT | Performed by: INTERNAL MEDICINE

## 2024-11-20 NOTE — PROGRESS NOTES
[facility]  Office visit  Dianna Caicedo  1985  [phone] (work)    VISIT DATE:  11/20/2024    PCP: Charsise Meyer PA-C  [id number]  [facility] [id number]    CC:  Chief Complaint   Patient presents with    Essential hypertension         ASSESSMENT:   Diagnosis Plan   1. Syncope and collapse            PLAN:  Presyncope/syncope: Combination of delayed orthostasis and intermittent neurocardiogenic syncope.  Currently well-controlled and not significantly affecting quality of life.  Continue avoid dehydration.  Previously discussed liberalizing sodium intake during flares.  Encouraged regular exercise, combination of both aerobic and strength training.  Previously discussed abortive maneuvers.  Not recommending initiation of pharmacologic therapy at this time.  Will follow-up on an as-needed basis.    Subjective  Interval assessment: No recent episodes of syncope.  Intermittent lightheadedness.  Blood pressures probably running less than 130/80 mmHg.  Denies chest pain, palpitations or dyspnea on exertion.      Initial evaluation: 37-year-old morbidly obese female with a history of hypertension, dyslipidemia, NGUYEN on CPAP and vasovagal syncope being evaluated for bariatric surgery with sleeve gastrectomy. She denies chest pain, palpitations, dyspnea on exertion. She is able to complete at least 4-6 METS of exertion without symptoms concerning for angina or CHF. Blood pressures running less than 130/80 mmHg on current medical therapy. She is compliant with medical therapy. Previous history of neurocardiogenic syncope which is significant improved in frequency over time, most recent episode in February 2020 with negative cardiac evaluation to include unremarkable CT angio chest, transthoracic echo, and 30-day ambulatory ECG monitor. Normal twelve-lead EKG today. Nondiabetic. Non-smoker. No early family history of coronary disease.  "    PHYSICAL EXAMINATION:  Vitals:    11/20/24 0906   BP: 124/90   BP Location: Left arm   Patient Position: Sitting   Cuff Size: Adult   Pulse: 107   SpO2: 99%   Weight: 101 kg (222 lb 12.8 oz)   Height: 172.7 cm (67.99\")       General Appearance:    Alert, cooperative, no distress, appears stated age   Head:    Normocephalic, without obvious abnormality, atraumatic   Eyes:    conjunctiva/corneas clear   Nose:   Nares normal, septum midline, mucosa normal, no drainage   Throat:   Lips, teeth and gums normal   Neck:   Supple, symmetrical, trachea midline, no carotid    bruit or JVD   Lungs:     Clear to auscultation bilaterally, respirations unlabored   Chest Wall:    No tenderness or deformity    Heart:    Regular rate and rhythm, S1 and S2 normal, no murmur, rub   or gallop, normal carotid impulse bilaterally without bruit.   Abdomen:     Soft, non-tender   Extremities:   Extremities normal, atraumatic, no cyanosis or edema   Pulses:   2+ and symmetric all extremities   Skin:   Skin color, texture, turgor normal, no rashes or lesions       Diagnostic Data:  Procedures  Lab Results   Component Value Date    CHLPL 154 10/16/2023    TRIG 116 10/16/2023    HDL 45 10/16/2023     Lab Results   Component Value Date    GLUCOSE 83 10/07/2024    BUN 10 10/07/2024    CREATININE 0.95 10/07/2024     10/07/2024    K 4.4 10/07/2024     10/07/2024    CO2 23 10/07/2024     Lab Results   Component Value Date    HGBA1C 4.30 (L) 10/17/2024     Lab Results   Component Value Date    WBC 7.6 10/07/2024    HGB 16.0 (H) 10/07/2024    HCT 47.9 (H) 10/07/2024     10/07/2024       Allergies  Allergies   Allergen Reactions    Nifedipine Shortness Of Breath, Swelling and Rash     Patient reports general swelling, slow onset shortness of breath, rash around wrists and ankles.  Has not taken other Ca channel blockers that she is aware of.    Sulfa Antibiotics Swelling     Throat swells    Phentermine Other (See Comments)     " Dose not higher doses 37.5 related to heart palpitations.        Current Medications    Current Outpatient Medications:     docusate sodium (COLACE) 250 MG capsule, Take 1 capsule by mouth Daily. (Patient taking differently: Take 1 capsule by mouth As Needed.), Disp: 365 capsule, Rfl: 0    FLUoxetine (PROzac) 40 MG capsule, Take 1 capsule by mouth Daily., Disp: 90 capsule, Rfl: 2    nystatin (MYCOSTATIN) 312201 UNIT/GM powder, Apply  topically to the appropriate area as directed 3 (Three) Times a Day. (Patient taking differently: Apply  topically to the appropriate area as directed As Needed.), Disp: 60 g, Rfl: 1    omeprazole (priLOSEC) 40 MG capsule, Take 1 capsule by mouth Daily., Disp: 90 capsule, Rfl: 3    Sodium Fluoride 5000 PPM 1.1 % paste, USE ONCE DAILY IN PLACE OF REGULAR TOOTHPASTE, Disp: , Rfl:     tiZANidine (ZANAFLEX) 2 MG tablet, Take 1 tablet by mouth At Night As Needed for Muscle Spasms., Disp: 30 tablet, Rfl: 11    Qsymia 7.5-46 MG capsule sustained-release 24 hr, Take 1 capsule by mouth Daily for 30 days., Disp: 30 capsule, Rfl: 0    Semaglutide-Weight Management (Wegovy) 1.7 MG/0.75ML solution auto-injector, Inject 0.75 mL under the skin into the appropriate area as directed 1 (One) Time Per Week for 30 days., Disp: 3 mL, Rfl: 1    Current Facility-Administered Medications:     cyanocobalamin injection 1,000 mcg, 1,000 mcg, Intramuscular, Q28 Days, Chuyita Johnson APRN, 1,000 mcg at 04/23/24 1201          ROS  ROS      SOCIAL HX  Social History     Socioeconomic History    Marital status:    Tobacco Use    Smoking status: Never     Passive exposure: Never    Smokeless tobacco: Never   Vaping Use    Vaping status: Never Used   Substance and Sexual Activity    Alcohol use: Not Currently     Comment: Social User    Drug use: No    Sexual activity: Yes     Partners: Male     Birth control/protection: None, Vasectomy, Hysterectomy       FAMILY HX  Family History   Problem Relation Age  of Onset    Prostate cancer Father         2023    Sleep apnea Father     Epilepsy Mother     Seizures Mother     No Known Problems Sister     Diabetes Paternal Grandfather     Lung cancer Paternal Grandfather     Cancer Maternal Grandmother     Arthritis Maternal Grandmother     Skin cancer Maternal Grandfather     Hypertension Maternal Grandfather     Diabetes Maternal Grandfather     Melanoma Maternal Grandfather     Lung cancer Maternal Grandfather         stage 4             Meliton Hassan III, MD, FACC

## 2024-11-21 ENCOUNTER — OFFICE VISIT (OUTPATIENT)
Dept: BARIATRICS/WEIGHT MGMT | Facility: CLINIC | Age: 39
End: 2024-11-21
Payer: OTHER GOVERNMENT

## 2024-11-21 VITALS
SYSTOLIC BLOOD PRESSURE: 122 MMHG | HEART RATE: 80 BPM | BODY MASS INDEX: 33.07 KG/M2 | HEIGHT: 68 IN | WEIGHT: 218.2 LBS | DIASTOLIC BLOOD PRESSURE: 78 MMHG

## 2024-11-21 DIAGNOSIS — E66.9 OBESITY (BMI 30-39.9): Primary | ICD-10-CM

## 2024-11-21 PROCEDURE — 99214 OFFICE O/P EST MOD 30 MIN: CPT | Performed by: NURSE PRACTITIONER

## 2024-11-21 RX ORDER — SEMAGLUTIDE 1.7 MG/.75ML
1.7 INJECTION, SOLUTION SUBCUTANEOUS WEEKLY
Qty: 3 ML | Refills: 1 | Status: CANCELLED | OUTPATIENT
Start: 2024-11-21 | End: 2024-12-21

## 2024-11-21 RX ORDER — PHENTERMINE AND TOPIRAMATE 7.5; 46 MG/1; MG/1
1 CAPSULE, EXTENDED RELEASE ORAL DAILY
Qty: 30 CAPSULE | Refills: 0 | Status: SHIPPED | OUTPATIENT
Start: 2024-11-21 | End: 2024-12-21

## 2024-11-21 RX ORDER — SEMAGLUTIDE 1 MG/.5ML
1 INJECTION, SOLUTION SUBCUTANEOUS WEEKLY
Qty: 2 ML | Refills: 1 | Status: SHIPPED | OUTPATIENT
Start: 2024-11-21 | End: 2024-12-21

## 2024-11-21 NOTE — ASSESSMENT & PLAN NOTE
Patient's (Body mass index is 33.18 kg/m².) indicates that they are obese (BMI >30) with health conditions that include hypertension and dyslipidemias . Weight is improving with treatment. BMI  is above average; BMI management plan is completed. We discussed low calorie, low carb based diet program, portion control, increasing exercise, pharmacologic options including qsymia and wegovy, and an joe-based approach such as [employer] Pal or Lose It.   --This is a follow up visit and she is down around 3.8lbs  --Continue qsymia. Reduced Wegovy from 1.7 to 1mg related to low A1C, will repeat in 2months  --Gaol to mindful of eating regularly.   -- Patient her first goal of 10% weight loss from her original start weight of 243.8.  Currently weighing 218.2.  Second goal we had set was being below 200 so goal is 199 pounds.  --Goal 4-6 lbs weight loss this month  -- Currently having some life stressors and discussed the option of referring to clinical psychology in house.  Patient declines at this point.

## 2024-11-22 ENCOUNTER — TELEMEDICINE (OUTPATIENT)
Dept: BARIATRICS/WEIGHT MGMT | Facility: CLINIC | Age: 39
End: 2024-11-22
Payer: OTHER GOVERNMENT

## 2024-11-22 DIAGNOSIS — K21.9 GASTROESOPHAGEAL REFLUX DISEASE, UNSPECIFIED WHETHER ESOPHAGITIS PRESENT: Primary | ICD-10-CM

## 2024-11-22 PROCEDURE — 99213 OFFICE O/P EST LOW 20 MIN: CPT | Performed by: SURGERY
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
## 2024-12-02 ENCOUNTER — OUTSIDE FACILITY SERVICE (OUTPATIENT)
Dept: BARIATRICS/WEIGHT MGMT | Facility: CLINIC | Age: 39
End: 2024-12-02
Payer: OTHER GOVERNMENT

## 2024-12-02 ENCOUNTER — LAB REQUISITION (OUTPATIENT)
Dept: LAB | Facility: HOSPITAL | Age: 39
End: 2024-12-02
Payer: OTHER GOVERNMENT

## 2024-12-02 DIAGNOSIS — K21.9 GASTRO-ESOPHAGEAL REFLUX DISEASE WITHOUT ESOPHAGITIS: ICD-10-CM

## 2024-12-02 PROCEDURE — 88305 TISSUE EXAM BY PATHOLOGIST: CPT | Performed by: SURGERY

## 2024-12-02 PROCEDURE — 43239 EGD BIOPSY SINGLE/MULTIPLE: CPT | Performed by: SURGERY

## 2024-12-03 ENCOUNTER — TELEPHONE (OUTPATIENT)
Dept: BARIATRICS/WEIGHT MGMT | Facility: CLINIC | Age: 39
End: 2024-12-03
Payer: OTHER GOVERNMENT

## 2024-12-03 DIAGNOSIS — K21.9 GASTROESOPHAGEAL REFLUX DISEASE, UNSPECIFIED WHETHER ESOPHAGITIS PRESENT: ICD-10-CM

## 2024-12-03 NOTE — TELEPHONE ENCOUNTER
----- Message from Maurice Mittal sent at 12/2/2024  8:18 AM EST -----    Please have her see me to discuss options, thanks!

## 2024-12-06 ENCOUNTER — CONSULT (OUTPATIENT)
Dept: BARIATRICS/WEIGHT MGMT | Facility: CLINIC | Age: 39
End: 2024-12-06
Payer: OTHER GOVERNMENT

## 2024-12-06 VITALS
DIASTOLIC BLOOD PRESSURE: 70 MMHG | BODY MASS INDEX: 33.16 KG/M2 | WEIGHT: 218.8 LBS | SYSTOLIC BLOOD PRESSURE: 118 MMHG | RESPIRATION RATE: 16 BRPM | HEIGHT: 68 IN | TEMPERATURE: 98.4 F | HEART RATE: 72 BPM | OXYGEN SATURATION: 98 %

## 2024-12-06 DIAGNOSIS — R10.13 DYSPEPSIA: Primary | ICD-10-CM

## 2024-12-06 PROCEDURE — 99214 OFFICE O/P EST MOD 30 MIN: CPT | Performed by: SURGERY

## 2024-12-06 RX ORDER — FAMOTIDINE 20 MG/1
20 TABLET, FILM COATED ORAL 2 TIMES DAILY PRN
Qty: 180 TABLET | Refills: 1 | Status: SHIPPED | OUTPATIENT
Start: 2024-12-06
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
## 2024-12-22 RX ORDER — PHENTERMINE AND TOPIRAMATE 7.5; 46 MG/1; MG/1
1 CAPSULE, EXTENDED RELEASE ORAL DAILY
Qty: 30 CAPSULE | Refills: 0 | Status: CANCELLED | OUTPATIENT
Start: 2024-12-22 | End: 2025-01-21

## 2024-12-22 RX ORDER — SEMAGLUTIDE 1 MG/.5ML
1 INJECTION, SOLUTION SUBCUTANEOUS WEEKLY
Qty: 2 ML | Refills: 1 | Status: CANCELLED | OUTPATIENT
Start: 2024-12-22 | End: 2025-01-21

## 2024-12-22 NOTE — PROGRESS NOTES
[facility]  [address]  [place] [address]     Office Note      Date: 2024  Patient Name: Dianna Caicedo  MRN: [id number]  : 1985    Subjective     Chief Complaint  Obesity Management follow-up    Dianna Caicedo presents to [facility] for obesity management.       Patient is satisfied with weight loss progress. Appetite is moderately controlled. Reports no side effects of prescribed medications today. The patient is not taking multivitamin and is not taking fish oil.  The patient is not using a food journal.  The patient rates current efforts as 6 out of 10. Has not taken Wegovy in 5 weeks related to having procedures done. Reports that she continues to having GERD and it has worsened after discontinuing GLP-1. She has a gastric emptying test for the end of February. Reports a mild increase in hunger but nothing aweful. Reports she has not taken Qsymia in last two days. Feels like her BP is elevated related to illness and stress.     The patient is exercising with a FITT score of:    Frequency Intensity Time Strength Training   []   0, none []   0 []   0 [x]   0   [x]   1 (1-2x/week) [x]   1 (light) []   1 (<10 min) []   1 (1x/week)   []   2 (3-5x/week) []   2 (moderate) []   2 (10-20 min) []   2 (2x/week)   []   3 (daily) []   3 (moderately hard)  []   4 (very hard) [x]   3 (20-30 min)  []   4 (>30 min) []   3 (3-4x/week)     Review of Systems   Constitutional:  Negative for appetite change and fatigue.   Eyes:  Negative for visual disturbance.   Cardiovascular:  Negative for chest pain and palpitations.   Gastrointestinal:  Negative for constipation and indigestion.   Neurological:  Negative for light-headedness.   All other systems reviewed and are negative.    Objective   Start weight: 243.8 pounds.    Total Loss lb/%Loss of beginning body weight (BBW): -22.2lb/-9.1%  Change in weight since last visit:  "+3.4    Recent Weight History:   Wt Readings from Last 6 Encounters:   12/23/24 101 kg (221 lb 9.6 oz)   12/06/24 99.2 kg (218 lb 12.8 oz)   11/21/24 99 kg (218 lb 3.2 oz)   11/20/24 101 kg (222 lb 12.8 oz)   10/17/24 101 kg (222 lb)   10/07/24 102 kg (225 lb 8 oz)     Body mass index is 33.69 kg/m².   Body composition analysis completed and showed:   Body Fat %: 44.9    Measurements (in inches)  Waist Circumference: 42.5    Vital Signs:   /90   Pulse 95   Ht 172.7 cm (68\")   Wt 101 kg (221 lb 9.6 oz)   BMI 33.69 kg/m²       Physical Exam  Vitals reviewed.   Constitutional:       Appearance: She is well-developed. She is obese. She is not ill-appearing.   Pulmonary:      Effort: Pulmonary effort is normal.   Neurological:      Mental Status: She is alert.   Psychiatric:         Attention and Perception: Attention and perception normal.         Mood and Affect: Mood normal.         Speech: Speech normal.         Behavior: Behavior normal. Behavior is cooperative.        Result Review :     Common labs          7/11/2024    15:01 10/7/2024    09:17 10/17/2024    09:38   Common Labs   Glucose  83     BUN  10     Creatinine  0.95     Sodium  138     Potassium  4.4     Chloride  103     Calcium  9.3     Total Protein  7.0     Albumin  4.5     Total Bilirubin  1.0     Alkaline Phosphatase  51     AST (SGOT)  11     ALT (SGPT)  8     WBC  7.6     Hemoglobin  16.0     Hematocrit  47.9     Platelets  245     Hemoglobin A1C 4.8   4.30               Assessment / Plan        Diagnoses and all orders for this visit:    1. Obesity (BMI 30-39.9) (Primary)  Assessment & Plan:  Patient's (Body mass index is 33.69 kg/m².) indicates that they are obese (BMI >30) with health conditions that include hypertension and dyslipidemias . Weight is improving with treatment. BMI  is above average; BMI management plan is completed. We discussed low calorie, low carb based diet program, portion control, increasing exercise, " pharmacologic options including Qsymia and Wegovy, and an joe-based approach such as MitoProd Pal or Lose It.   --This was a follow up visit and she is up around 3 lbs  --Continue to hold Wegovy until advised OK to restart. When restarting will start start at lowest dose 0.25mg. Do NOT highest dose after break in medication.   --BP mildly elevated. Hold Qsymia if ever above 150/90. Check BP at house.   --Declines refills today.       Orders:  -     Hemoglobin A1c    2. Long-term use of high-risk medication        We discussed the risks, benefits, and limitations of treatments. Continue medications and OTC supplements as discussed. Patient verbalizes understanding of and agreement with management plan.     Follow Up   Return in about 4 weeks (around 1/20/2025).  Patient was given instructions and counseling regarding her condition or for health maintenance advice. Please see specific information pulled into the AVS if appropriate.     I spent 30 minutes on this date of service. This time includes time spent by me in the following activities:preparing for the visit, counseling and educating the patient/family/caregiver, ordering medications, tests, or procedures and documenting information in the medical record.    Chuyita Johnson, APRN  12/23/2024

## 2024-12-23 ENCOUNTER — OFFICE VISIT (OUTPATIENT)
Dept: BARIATRICS/WEIGHT MGMT | Facility: CLINIC | Age: 39
End: 2024-12-23
Payer: OTHER GOVERNMENT

## 2024-12-23 VITALS
SYSTOLIC BLOOD PRESSURE: 130 MMHG | HEART RATE: 95 BPM | WEIGHT: 221.6 LBS | BODY MASS INDEX: 33.59 KG/M2 | DIASTOLIC BLOOD PRESSURE: 90 MMHG | HEIGHT: 68 IN

## 2024-12-23 DIAGNOSIS — Z79.899 LONG-TERM USE OF HIGH-RISK MEDICATION: ICD-10-CM

## 2024-12-23 DIAGNOSIS — E66.9 OBESITY (BMI 30-39.9): Primary | ICD-10-CM

## 2024-12-23 PROCEDURE — 99214 OFFICE O/P EST MOD 30 MIN: CPT | Performed by: NURSE PRACTITIONER

## 2024-12-23 NOTE — ASSESSMENT & PLAN NOTE
Patient's (Body mass index is 33.69 kg/m².) indicates that they are obese (BMI >30) with health conditions that include hypertension and dyslipidemias . Weight is improving with treatment. BMI  is above average; BMI management plan is completed. We discussed low calorie, low carb based diet program, portion control, increasing exercise, pharmacologic options including Qsymia and Wegovy, and an joe-based approach such as Gudog Pal or Lose It.   --This was a follow up visit and she is up around 3 lbs  --Continue to hold Wegovy until advised OK to restart. When restarting will start start at lowest dose 0.25mg. Do NOT highest dose after break in medication.   --BP mildly elevated. Hold Qsymia if ever above 150/90. Check BP at house.   --Declines refills today.   --Addendum 12/29/2024: A1C remains low, discuss at next visit

## 2024-12-24 LAB — HBA1C MFR BLD: <4.3 % (ref 4.8–5.6)

## 2025-01-20 NOTE — PROGRESS NOTES
[facility]  [address]  [place] [address]     Office Note      Date: 2025  Patient Name: Dianna Caicedo  MRN: [id number]  : 1985    Subjective     Chief Complaint  Obesity Management follow-up    Dianna Caicedo presents to [facility] for obesity management.       Patient is unsure with weight loss progress. Appetite is moderately controlled. Reports no side effects of prescribed medications today. The patient is taking multivitamin and is not taking fish oil.  The patient is not using a food journal.  The patient rates current efforts as 5 out of 10. She has been off GLP-1 but continue to take Qsymia. Reports that she is scheduled for an emptying test next month. She is likely having hernia repair and possibly bypass surgery at the same time.     The patient is exercising with a FITT score of:    Frequency Intensity Time Strength Training   []   0, none []   0 []   0 [x]   0   [x]   1 (1-2x/week) [x]   1 (light) []   1 (<10 min) []   1 (1x/week)   []   2 (3-5x/week) []   2 (moderate) []   2 (10-20 min) []   2 (2x/week)   []   3 (daily) []   3 (moderately hard)  []   4 (very hard) [x]   3 (20-30 min)  []   4 (>30 min) []   3 (3-4x/week)     Review of Systems   Constitutional:  Negative for appetite change and fatigue.   Eyes:  Negative for visual disturbance.   Cardiovascular:  Negative for chest pain and palpitations.   Gastrointestinal:  Positive for GERD. Negative for constipation and indigestion.   Neurological:  Negative for light-headedness.     Objective   Start weight: 243.8 pounds.    Total Loss lb/%Loss of beginning body weight (BBW): -20lb/-8.20%  Change in weight since last visit: +1.9    Recent Weight History:   Wt Readings from Last 6 Encounters:   25 102 kg (223 lb 12.8 oz)   24 101 kg (221 lb 9.6 oz)   24 99.2 kg (218 lb 12.8 oz)   24 99 kg (218 lb 3.2 oz)   24 101 kg  "(222 lb 12.8 oz)   10/17/24 101 kg (222 lb)     Body mass index is 34.03 kg/m².   Body composition analysis completed and showed:   Body Fat %: 45.1%    Measurements (in inches)  Waist Circumference: 43.5    Vital Signs:   /68   Pulse 78   Resp 18   Ht 172.7 cm (68\")   Wt 102 kg (223 lb 12.8 oz)   SpO2 98%   BMI 34.03 kg/m²       Physical Exam  Vitals reviewed.   Constitutional:       Appearance: She is obese. She is not ill-appearing.   Pulmonary:      Effort: Pulmonary effort is normal.   Neurological:      Mental Status: She is alert.   Psychiatric:         Attention and Perception: Attention and perception normal.         Behavior: Behavior is cooperative.      Result Review :     Common labs          10/7/2024    09:17 10/17/2024    09:38 12/23/2024    10:30   Common Labs   Glucose 83      BUN 10      Creatinine 0.95      Sodium 138      Potassium 4.4      Chloride 103      Calcium 9.3      Total Protein 7.0      Albumin 4.5      Total Bilirubin 1.0      Alkaline Phosphatase 51      AST (SGOT) 11      ALT (SGPT) 8      WBC 7.6      Hemoglobin 16.0      Hematocrit 47.9      Platelets 245      Hemoglobin A1C  4.30  <4.30            Assessment / Plan        Diagnoses and all orders for this visit:    1. Obesity (BMI 30-39.9) (Primary)  Assessment & Plan:  Patient's (Body mass index is 34.03 kg/m².) indicates that they are obese (BMI >30) with health conditions that include hypertension and dyslipidemias . Weight is improving with treatment. BMI  is above average; BMI management plan is completed. We discussed portion control, increasing exercise, pharmacologic options including qsymia, and an joe-based approach such as Shopitize Pal or Lose It.     --This is a follow up visit and she is up around 2 lb  --Currently taking Qsymia  (increasing dose this month) and d/c'd Wegovy. Advised the A1C was very low at last visit and we will get a repeat in 2 months. Do not focus on super low carb allowing some " fruits vegetables and whole grains. Bogdan and UDS reviewed.   --Referral to psychiatry, pt questioning if mood meds are working and would like to discuss alternatives.   --#1 goal is to restart food journaling. Goal to reach 15/30 days and will bring to next visit    Orders:  -     Discontinue: Phentermine-Topiramate (Qsymia) 11.25-69 MG capsule sustained-release 24 hr; Take 11 mg by mouth Daily for 30 days.  Dispense: 30 capsule; Refill: 0  -     Phentermine-Topiramate (Qsymia) 11.25-69 MG capsule sustained-release 24 hr; Take 11 mg by mouth Daily for 30 days.  Dispense: 30 capsule; Refill: 1    2. Abnormal laboratory test    3. Stress  -     Ambulatory Referral to Psychiatry      We discussed the risks, benefits, and limitations of treatments. Continue medications and OTC supplements as discussed. Patient verbalizes understanding of and agreement with management plan.     Follow Up   Return in about 4 weeks (around 2/20/2025).  Patient was given instructions and counseling regarding her condition or for health maintenance advice. Please see specific information pulled into the AVS if appropriate.     I spent 40 minutes on this date of service. This time includes time spent by me in the following activities:preparing for the visit, counseling and educating the patient/family/caregiver, ordering medications, tests, or procedures and documenting information in the medical record.    Chuyita Johnson, APRN  01/23/2025

## 2025-01-23 ENCOUNTER — OFFICE VISIT (OUTPATIENT)
Dept: BARIATRICS/WEIGHT MGMT | Facility: CLINIC | Age: 40
End: 2025-01-23
Payer: OTHER GOVERNMENT

## 2025-01-23 VITALS
BODY MASS INDEX: 33.92 KG/M2 | HEART RATE: 78 BPM | DIASTOLIC BLOOD PRESSURE: 68 MMHG | RESPIRATION RATE: 18 BRPM | OXYGEN SATURATION: 98 % | WEIGHT: 223.8 LBS | HEIGHT: 68 IN | SYSTOLIC BLOOD PRESSURE: 122 MMHG

## 2025-01-23 DIAGNOSIS — F43.9 STRESS: ICD-10-CM

## 2025-01-23 DIAGNOSIS — R89.9 ABNORMAL LABORATORY TEST: ICD-10-CM

## 2025-01-23 DIAGNOSIS — E66.9 OBESITY (BMI 30-39.9): Primary | ICD-10-CM

## 2025-01-23 RX ORDER — PHENTERMINE AND TOPIRAMATE 11.25; 69 MG/1; MG/1
11 CAPSULE, EXTENDED RELEASE ORAL DAILY
Qty: 30 CAPSULE | Refills: 1 | Status: SHIPPED | OUTPATIENT
Start: 2025-01-23 | End: 2025-02-22

## 2025-01-23 RX ORDER — PHENTERMINE AND TOPIRAMATE 11.25; 69 MG/1; MG/1
11 CAPSULE, EXTENDED RELEASE ORAL DAILY
Qty: 30 CAPSULE | Refills: 0 | Status: SHIPPED | OUTPATIENT
Start: 2025-01-23 | End: 2025-01-23 | Stop reason: SDUPTHER

## 2025-01-23 RX ADMIN — CYANOCOBALAMIN 1000 MCG: 1000 INJECTION, SOLUTION INTRAMUSCULAR; SUBCUTANEOUS at 09:29

## 2025-01-23 NOTE — ASSESSMENT & PLAN NOTE
Patient's (Body mass index is 34.03 kg/m².) indicates that they are obese (BMI >30) with health conditions that include hypertension and dyslipidemias . Weight is improving with treatment. BMI  is above average; BMI management plan is completed. We discussed portion control, increasing exercise, pharmacologic options including qsymia, and an joe-based approach such as CromoUp Pal or Lose It.     --This is a follow up visit and she is up around 2 lb  --Currently taking Qsymia  (increasing dose this month) and d/c'd Wegovy. Advised the A1C was very low at last visit and we will get a repeat in 2 months. Do not focus on super low carb allowing some fruits vegetables and whole grains. Bogdan and REJI reviewed.   --Referral to psychiatry, pt questioning if mood meds are working and would like to discuss alternatives.   --#1 goal is to restart food journaling. Goal to reach 15/30 days and will bring to next visit

## 2025-02-20 NOTE — PROGRESS NOTES
[facility]  [address]  [place] [address]     Office Note      Date: 2025  Patient Name: Dianna Caicedo  MRN: [id number]  : 1985    Subjective     Chief Complaint  Obesity Management follow-up    Dianna Caicedo presents to [facility] for obesity management.     Patient is unsure with weight loss progress. Appetite is moderately controlled. Reports no side effects of prescribed medications today. The patient is taking multivitamin and is not taking fish oil.  The patient is not using a food journal.  The patient rates current efforts as 5 out of 10.    The patient is exercising with a FITT score of:    Frequency Intensity Time Strength Training   []   0, none []   0 []   0 [x]   0   [x]   1 (1-2x/week) [x]   1 (light) []   1 (<10 min) []   1 (1x/week)   []   2 (3-5x/week) []   2 (moderate) []   2 (10-20 min) []   2 (2x/week)   []   3 (daily) []   3 (moderately hard)  []   4 (very hard) []   3 (20-30 min)  [x]   4 (>30 min) []   3 (3-4x/week)     Review of Systems   Constitutional:  Negative for appetite change and fatigue.   Eyes:  Negative for visual disturbance.   Cardiovascular:  Negative for chest pain and palpitations.   Gastrointestinal:  Negative for constipation and indigestion.   Neurological:  Negative for light-headedness.   All other systems reviewed and are negative.    Objective   Start weight: 243.8 pounds.    Total Loss lb/%Loss of beginning body weight (BBW): -17.4lb/-7.13%  Change in weight since last visit: +2.6    Recent Weight History:   Wt Readings from Last 6 Encounters:   25 103 kg (226 lb 6.4 oz)   25 102 kg (223 lb 12.8 oz)   24 101 kg (221 lb 9.6 oz)   24 99.2 kg (218 lb 12.8 oz)   24 99 kg (218 lb 3.2 oz)   24 101 kg (222 lb 12.8 oz)     Body mass index is 34.42 kg/m².   Body composition analysis completed and showed:   Body Fat %:  "44.2    Measurements (in inches)  Waist Circumference: 44.5    Vital Signs:   /88 (BP Location: Left arm, Patient Position: Sitting)   Pulse 91   Ht 172.7 cm (68\")   Wt 103 kg (226 lb 6.4 oz)   BMI 34.42 kg/m²       Physical Exam  Vitals reviewed.   Constitutional:       Appearance: She is obese. She is not ill-appearing.   Pulmonary:      Effort: Pulmonary effort is normal.   Neurological:      Mental Status: She is alert.   Psychiatric:         Attention and Perception: Attention and perception normal.         Behavior: Behavior is cooperative.        Result Review :     Common labs          10/7/2024    09:17 10/17/2024    09:38 12/23/2024    10:30   Common Labs   Glucose 83      BUN 10      Creatinine 0.95      Sodium 138      Potassium 4.4      Chloride 103      Calcium 9.3      Albumin 4.5      Total Bilirubin 1.0      Alkaline Phosphatase 51      AST (SGOT) 11      ALT (SGPT) 8      WBC 7.6      Hemoglobin 16.0      Hematocrit 47.9      Platelets 245      Hemoglobin A1C  4.30  <4.30        Assessment / Plan        Diagnoses and all orders for this visit:    1. Obesity (BMI 30-39.9) (Primary)  Assessment & Plan:  Patient's (Body mass index is 34.42 kg/m².) indicates that they are obese (BMI >30) with health conditions that include obstructive sleep apnea and hypertension . Weight is improving with treatment. BMI  is above average; BMI management plan is completed. We discussed low calorie, low carb based diet program, portion control, increasing exercise, pharmacologic options including qsymia, and an joe-based approach such as tibdit Pal or Lose It.   --This is a follow up and she is around 2-1/2 pounds since last being seen.  --Currently taking Qsymia and tolerating well.  Continue this medication.  --Currently working with bariatrics and considering a hernia repair and possible bypass.  She plans to see them sometime this month or next.  --She was referred to behavioral health and has that " appointment this month as well.  --In the past she had used a GLP-1 and discontinued related to low A1c.  Will repeat A1c at next office visit to confirm this has improved.  Discussed that although she does not tolerate higher levels we may consider restarting at lower levels at some time in the future and monitor A1c closely.    Orders:  -     Phentermine-Topiramate (Qsymia) 11.25-69 MG capsule sustained-release 24 hr; Take 11 mg by mouth Daily for 30 days.  Dispense: 30 capsule; Refill: 1    We discussed the risks, benefits, and limitations of treatments. Continue medications and OTC supplements as discussed. Patient verbalizes understanding of and agreement with management plan.     Follow Up   Return in about 4 weeks (around 3/25/2025).  Patient was given instructions and counseling regarding her condition or for health maintenance advice. Please see specific information pulled into the AVS if appropriate.     I spent 40 minutes on this date of service. This time includes time spent by me in the following activities:preparing for the visit, counseling and educating the patient/family/caregiver, ordering medications, tests, or procedures and documenting information in the medical record.    Chuyita Johnson, APRN  02/25/2025

## 2025-02-21 ENCOUNTER — HOSPITAL ENCOUNTER (OUTPATIENT)
Dept: NUCLEAR MEDICINE | Facility: HOSPITAL | Age: 40
Discharge: HOME | End: 2025-02-21
Payer: OTHER GOVERNMENT

## 2025-02-21 DIAGNOSIS — R10.13 DYSPEPSIA: ICD-10-CM

## 2025-02-21 PROCEDURE — A9541 TC99M SULFUR COLLOID: HCPCS | Performed by: PHYSICIAN ASSISTANT

## 2025-02-21 PROCEDURE — 78264 GASTRIC EMPTYING IMG STUDY: CPT

## 2025-02-21 PROCEDURE — 34310000005 TECHNETIUM SULFUR COLLOID: Performed by: PHYSICIAN ASSISTANT

## 2025-02-21 RX ADMIN — TECHNETIUM TC 99M SULFUR COLLOID 1 DOSE: KIT at 10:56

## 2025-02-25 ENCOUNTER — OFFICE VISIT (OUTPATIENT)
Dept: BARIATRICS/WEIGHT MGMT | Facility: CLINIC | Age: 40
End: 2025-02-25
Payer: OTHER GOVERNMENT

## 2025-02-25 VITALS
BODY MASS INDEX: 34.31 KG/M2 | WEIGHT: 226.4 LBS | HEIGHT: 68 IN | HEART RATE: 91 BPM | SYSTOLIC BLOOD PRESSURE: 124 MMHG | DIASTOLIC BLOOD PRESSURE: 88 MMHG

## 2025-02-25 DIAGNOSIS — E66.9 OBESITY (BMI 30-39.9): Primary | ICD-10-CM

## 2025-02-25 RX ORDER — PHENTERMINE AND TOPIRAMATE 11.25; 69 MG/1; MG/1
11 CAPSULE, EXTENDED RELEASE ORAL DAILY
Qty: 30 CAPSULE | Refills: 1 | Status: SHIPPED | OUTPATIENT
Start: 2025-02-25 | End: 2025-03-27

## 2025-02-25 NOTE — ASSESSMENT & PLAN NOTE
Patient's (Body mass index is 34.42 kg/m².) indicates that they are obese (BMI >30) with health conditions that include obstructive sleep apnea and hypertension . Weight is improving with treatment. BMI  is above average; BMI management plan is completed. We discussed low calorie, low carb based diet program, portion control, increasing exercise, pharmacologic options including qsymia, and an joe-based approach such as ArriveBefore Pal or Lose It.   --This is a follow up and she is around 2-1/2 pounds since last being seen.  --Currently taking Qsymia and tolerating well.  Continue this medication.  --Currently working with bariatrics and considering a hernia repair and possible bypass.  She plans to see them sometime this month or next.  --She was referred to behavioral health and has that appointment this month as well.  --In the past she had used a GLP-1 and discontinued related to low A1c.  Will repeat A1c at next office visit to confirm this has improved.  Discussed that although she does not tolerate higher levels we may consider restarting at lower levels at some time in the future and monitor A1c closely.

## 2025-02-27 ENCOUNTER — OFFICE VISIT (OUTPATIENT)
Age: 40
End: 2025-02-27
Payer: OTHER GOVERNMENT

## 2025-02-27 VITALS
WEIGHT: 225 LBS | HEIGHT: 68 IN | DIASTOLIC BLOOD PRESSURE: 88 MMHG | BODY MASS INDEX: 34.1 KG/M2 | OXYGEN SATURATION: 98 % | HEART RATE: 91 BPM | SYSTOLIC BLOOD PRESSURE: 142 MMHG

## 2025-02-27 DIAGNOSIS — F33.1 MODERATE EPISODE OF RECURRENT MAJOR DEPRESSIVE DISORDER: Chronic | ICD-10-CM

## 2025-02-27 DIAGNOSIS — Z51.81 ENCOUNTER FOR THERAPEUTIC DRUG MONITORING: ICD-10-CM

## 2025-02-27 DIAGNOSIS — F41.1 GENERALIZED ANXIETY DISORDER: Chronic | ICD-10-CM

## 2025-02-27 RX ORDER — BUPROPION HYDROCHLORIDE 150 MG/1
150 TABLET ORAL EVERY MORNING
Qty: 30 TABLET | Refills: 5 | Status: SHIPPED | OUTPATIENT
Start: 2025-02-27

## 2025-02-27 RX ORDER — FLUOXETINE HYDROCHLORIDE 40 MG/1
40 CAPSULE ORAL DAILY
Qty: 90 CAPSULE | Refills: 2 | Status: SHIPPED | OUTPATIENT
Start: 2025-02-27
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
## 2025-03-03 LAB
1OH-MIDAZOLAM UR QL SCN: NOT DETECTED NG/MG CREAT
6MAM UR QL SCN: NEGATIVE NG/ML
7AMINOCLONAZEPAM/CREAT UR: NOT DETECTED NG/MG CREAT
A-OH ALPRAZ/CREAT UR: NOT DETECTED NG/MG CREAT
A-OH-TRIAZOLAM/CREAT UR CFM: NOT DETECTED NG/MG CREAT
ACP UR QL CFM: NOT DETECTED
ALPRAZ/CREAT UR CFM: NOT DETECTED NG/MG CREAT
AMPHETAMINES UR QL SCN: NEGATIVE NG/ML
APAP UR QL SCN: NORMAL UG/ML
APAP UR QL: NORMAL
APAP UR-MCNC: PRESENT UG/ML
BARBITURATES UR QL SCN: NEGATIVE NG/ML
BENZODIAZ SCN METH UR: NEGATIVE
BUPRENORPHINE UR QL SCN: NEGATIVE
BUPRENORPHINE/CREAT UR: NOT DETECTED NG/MG CREAT
CANNABINOIDS UR QL SCN: NEGATIVE NG/ML
CARISOPRODOL UR QL: NEGATIVE NG/ML
CLONAZEPAM/CREAT UR CFM: NOT DETECTED NG/MG CREAT
COCAINE+BZE UR QL SCN: NEGATIVE NG/ML
CREAT UR-MCNC: 32 MG/DL
D-METHORPHAN UR-MCNC: NOT DETECTED NG/ML
D-METHORPHAN+LEVORPHANOL UR QL: NOT DETECTED
DESALKYLFLURAZ/CREAT UR: NOT DETECTED NG/MG CREAT
DIAZEPAM/CREAT UR: NOT DETECTED NG/MG CREAT
ETHANOL UR QL SCN: NEGATIVE G/DL
ETHANOL UR QL SCN: NEGATIVE NG/ML
FENTANYL CTO UR SCN-MCNC: NEGATIVE NG/ML
FENTANYL/CREAT UR: NOT DETECTED NG/MG CREAT
FLUNITRAZEPAM UR QL SCN: NOT DETECTED NG/MG CREAT
GABAPENTIN UR-MCNC: NEGATIVE UG/ML
HALLUCINOGENS UR: NEGATIVE
HYPNOTICS UR QL SCN: NEGATIVE
KETAMINE UR QL: NOT DETECTED
LORAZEPAM/CREAT UR: NOT DETECTED NG/MG CREAT
MEPERIDINE UR QL SCN: NEGATIVE NG/ML
METHADONE UR QL SCN: NEGATIVE NG/ML
METHADONE+METAB UR QL SCN: NEGATIVE NG/ML
MIDAZOLAM/CREAT UR CFM: NOT DETECTED NG/MG CREAT
MISCELLANEOUS, UR: NEGATIVE
NORBUPRENORPHINE/CREAT UR: NOT DETECTED NG/MG CREAT
NORDIAZEPAM/CREAT UR: NOT DETECTED NG/MG CREAT
NORFENTANYL/CREAT UR: NOT DETECTED NG/MG CREAT
NORFLUNITRAZEPAM UR-MCNC: NOT DETECTED NG/MG CREAT
NORKETAMINE UR-MCNC: NOT DETECTED UG/ML
OPIATES UR SCN-MCNC: NEGATIVE NG/ML
OXAZEPAM/CREAT UR: NOT DETECTED NG/MG CREAT
OXYCODONE CTO UR SCN-MCNC: NEGATIVE NG/ML
PCP UR QL SCN: NEGATIVE NG/ML
PRESCRIBED MEDICATIONS: NORMAL
PROPOXYPH UR QL SCN: NEGATIVE NG/ML
TAPENTADOL CTO UR SCN-MCNC: NEGATIVE NG/ML
TEMAZEPAM/CREAT UR: NOT DETECTED NG/MG CREAT
TRAMADOL UR QL SCN: NEGATIVE NG/ML
ZALEPLON UR-MCNC: NOT DETECTED NG/ML
ZOLPIDEM PHENYL-4-CARB UR QL SCN: NOT DETECTED
ZOLPIDEM UR QL SCN: NOT DETECTED
ZOPICLONE-N-OXIDE UR-MCNC: NOT DETECTED NG/ML

## 2025-03-24 NOTE — PROGRESS NOTES
[facility]  [address]  [place] [address]     Office Note      Date: 2025  Patient Name: Dianna Caicedo  MRN: [id number]  : 1985    Subjective     Chief Complaint  Obesity Management follow-up    Dianna Caicedo presents to [facility] for obesity management.     Patient is unsatisfied with weight loss progress. Appetite is poorly controlled. Reports no side effects of prescribed medications today. The patient is taking multivitamin and is not taking fish oil.  The patient is using a food journal.  The patient rates current efforts as 5 out of 10.  She has journaled the past week.  Reports she is currently taking Qsymia.    The patient is exercising with a FITT score of:    Frequency Intensity Time Strength Training   []   0, none []   0 []   0 [x]   0   [x]   1 (1-2x/week) [x]   1 (light) []   1 (<10 min) []   1 (1x/week)   []   2 (3-5x/week) []   2 (moderate) []   2 (10-20 min) []   2 (2x/week)   []   3 (daily) []   3 (moderately hard)  []   4 (very hard) []   3 (20-30 min)  [x]   4 (>30 min) []   3 (3-4x/week)     Review of Systems   Constitutional:  Negative for appetite change and fatigue.   Eyes:  Negative for visual disturbance.   Cardiovascular:  Negative for chest pain and palpitations.   Gastrointestinal:  Negative for constipation and indigestion.   Neurological:  Negative for light-headedness.       Objective   Start weight: 243.8 pounds.    Total Loss lb/%Loss of beginning body weight (BBW): -16.8 lb/-6.89%  Change in weight since last visit: +0.6 lb    Recent Weight History:   Wt Readings from Last 6 Encounters:   25 103 kg (227 lb 6.4 oz)   25 103 kg (227 lb)   25 102 kg (225 lb)   25 103 kg (226 lb 6.4 oz)   25 102 kg (223 lb 12.8 oz)   24 101 kg (221 lb 9.6 oz)         Body mass index is 34.52 kg/m².   Body composition analysis completed and showed:   Body Fat  "%: 45.1%    Measurements (in inches)  Waist Circumference: 44    Vital Signs:   /84   Pulse 105   Resp 18   Ht 172.7 cm (68\")   Wt 103 kg (227 lb)   SpO2 98%   BMI 34.52 kg/m²       Physical Exam  Vitals reviewed.   Constitutional:       Appearance: She is obese. She is not ill-appearing.   Pulmonary:      Effort: Pulmonary effort is normal.   Neurological:      Mental Status: She is alert.   Psychiatric:         Attention and Perception: Attention and perception normal.         Behavior: Behavior is cooperative.        Result Review :     Common labs          10/7/2024    09:17 10/17/2024    09:38 12/23/2024    10:30   Common Labs   Glucose 83      BUN 10      Creatinine 0.95      Sodium 138      Potassium 4.4      Chloride 103      Calcium 9.3      Albumin 4.5      Total Bilirubin 1.0      Alkaline Phosphatase 51      AST (SGOT) 11      ALT (SGPT) 8      WBC 7.6      Hemoglobin 16.0      Hematocrit 47.9      Platelets 245      Hemoglobin A1C  4.30  <4.30               Assessment / Plan        Diagnoses and all orders for this visit:    1. Obesity (BMI 30-39.9) (Primary)  Assessment & Plan:  Patient's (Body mass index is 34.52 kg/m².) indicates that they are obese (BMI >30) with health conditions that include hypertension . Weight is unchanged. BMI  is above average; BMI management plan is completed. We discussed portion control, increasing exercise, pharmacologic options including qsymia, and an joe-based approach such as [employer] Pal or Lose It.   -- Follow-up visit patient is around the same as she was last month.  --Repeat A1c and add in vitamin D and B12.  She does have a history of low vitamin D levels.  Past when she has taken a GLP-1 her A1c has decreased and repeating just to confirm it is back within normal levels.  --A1C, Vit D and B12/folate to be completed this visit but not; complete next visit    Orders:  -     Phentermine-Topiramate (Qsymia) 11.25-69 MG capsule sustained-release 24 hr; " Take 11 mg by mouth Daily for 30 days.  Dispense: 30 capsule; Refill: 1  -     Hemoglobin A1c    2. Encounter for long-term current use of medication  -     Hemoglobin A1c    3. Vitamin D deficiency  -     Vitamin D,25-Hydroxy    4. Fatigue, unspecified type  -     Vitamin D,25-Hydroxy  -     Vitamin B12  -     Folate      We discussed the risks, benefits, and limitations of treatments. Continue medications and OTC supplements as discussed. Patient verbalizes understanding of and agreement with management plan.     Follow Up   Return in about 4 weeks (around 4/22/2025).  Patient was given instructions and counseling regarding her condition or for health maintenance advice. Please see specific information pulled into the AVS if appropriate.     I spent 30 minutes on this date of service. This time includes time spent by me in the following activities:preparing for the visit, counseling and educating the patient/family/caregiver, ordering medications, tests, or procedures and documenting information in the medical record.    Chuyita Johnson, APRN  03/25/2025

## 2025-03-25 ENCOUNTER — OFFICE VISIT (OUTPATIENT)
Dept: BARIATRICS/WEIGHT MGMT | Facility: CLINIC | Age: 40
End: 2025-03-25
Payer: OTHER GOVERNMENT

## 2025-03-25 VITALS
TEMPERATURE: 97.3 F | RESPIRATION RATE: 16 BRPM | WEIGHT: 227.4 LBS | HEART RATE: 88 BPM | DIASTOLIC BLOOD PRESSURE: 76 MMHG | BODY MASS INDEX: 34.46 KG/M2 | HEIGHT: 68 IN | SYSTOLIC BLOOD PRESSURE: 118 MMHG | OXYGEN SATURATION: 99 %

## 2025-03-25 VITALS
SYSTOLIC BLOOD PRESSURE: 130 MMHG | HEART RATE: 105 BPM | BODY MASS INDEX: 34.4 KG/M2 | RESPIRATION RATE: 18 BRPM | OXYGEN SATURATION: 98 % | DIASTOLIC BLOOD PRESSURE: 84 MMHG | HEIGHT: 68 IN | WEIGHT: 227 LBS

## 2025-03-25 DIAGNOSIS — Z79.899 ENCOUNTER FOR LONG-TERM CURRENT USE OF MEDICATION: ICD-10-CM

## 2025-03-25 DIAGNOSIS — E55.9 VITAMIN D DEFICIENCY: ICD-10-CM

## 2025-03-25 DIAGNOSIS — E66.9 OBESITY (BMI 30-39.9): Primary | ICD-10-CM

## 2025-03-25 DIAGNOSIS — R53.83 FATIGUE, UNSPECIFIED TYPE: ICD-10-CM

## 2025-03-25 DIAGNOSIS — K44.9 HIATAL HERNIA WITH GERD: Primary | ICD-10-CM

## 2025-03-25 DIAGNOSIS — K21.9 HIATAL HERNIA WITH GERD: Primary | ICD-10-CM

## 2025-03-25 PROCEDURE — 96372 THER/PROPH/DIAG INJ SC/IM: CPT | Performed by: NURSE PRACTITIONER

## 2025-03-25 PROCEDURE — 99214 OFFICE O/P EST MOD 30 MIN: CPT | Performed by: NURSE PRACTITIONER

## 2025-03-25 RX ORDER — PHENTERMINE AND TOPIRAMATE 11.25; 69 MG/1; MG/1
11 CAPSULE, EXTENDED RELEASE ORAL DAILY
Qty: 30 CAPSULE | Refills: 1 | Status: SHIPPED | OUTPATIENT
Start: 2025-03-25 | End: 2025-04-24

## 2025-03-25 RX ADMIN — CYANOCOBALAMIN 1000 MCG: 1000 INJECTION, SOLUTION INTRAMUSCULAR; SUBCUTANEOUS at 08:43

## 2025-03-25 NOTE — ASSESSMENT & PLAN NOTE
Patient's (Body mass index is 34.52 kg/m².) indicates that they are obese (BMI >30) with health conditions that include hypertension . Weight is unchanged. BMI  is above average; BMI management plan is completed. We discussed portion control, increasing exercise, pharmacologic options including qsymia, and an joe-based approach such as [employer] Pal or Lose It.   -- Follow-up visit patient is around the same as she was last month.  --Repeat A1c and add in vitamin D and B12.  She does have a history of low vitamin D levels.  Past when she has taken a GLP-1 her A1c has decreased and repeating just to confirm it is back within normal levels.  --A1C, Vit D and B12/folate to be completed this visit but not; complete next visit

## 2025-03-26 LAB
25(OH)D3+25(OH)D2 SERPL-MCNC: 23.3 NG/ML (ref 30–100)
FOLATE SERPL-MCNC: 5.9 NG/ML (ref 4.78–24.2)
HBA1C MFR BLD: 4.6 % (ref 4.8–5.6)
VIT B12 SERPL-MCNC: >2000 PG/ML (ref 211–946)

## 2025-04-15 PROBLEM — K21.9 HIATAL HERNIA WITH GERD: Status: ACTIVE | Noted: 2025-03-25

## 2025-04-15 PROBLEM — K44.9 HIATAL HERNIA WITH GERD: Status: ACTIVE | Noted: 2025-03-25

## 2025-04-28 ENCOUNTER — OFFICE VISIT (OUTPATIENT)
Dept: BARIATRICS/WEIGHT MGMT | Facility: CLINIC | Age: 40
End: 2025-04-28
Payer: OTHER GOVERNMENT

## 2025-04-28 VITALS
HEART RATE: 80 BPM | HEIGHT: 68 IN | DIASTOLIC BLOOD PRESSURE: 78 MMHG | BODY MASS INDEX: 34.93 KG/M2 | SYSTOLIC BLOOD PRESSURE: 116 MMHG | WEIGHT: 230.5 LBS

## 2025-04-28 DIAGNOSIS — E66.9 OBESITY (BMI 30-39.9): ICD-10-CM

## 2025-04-28 RX ORDER — PHENTERMINE AND TOPIRAMATE 11.25; 69 MG/1; MG/1
11 CAPSULE, EXTENDED RELEASE ORAL DAILY
Qty: 30 CAPSULE | Refills: 1 | Status: SHIPPED | OUTPATIENT
Start: 2025-04-28 | End: 2025-05-28

## 2025-04-28 NOTE — PROGRESS NOTES
[facility]  [address]  [place] [address]     Office Note      Date: 2025  Patient Name: Dianna Caicedo  MRN: [id number]  : 1985    Subjective     Chief Complaint  Obesity Management follow-up    Dianna Caicedo presents to [facility] for obesity management.     Patient is unsatisfied with weight loss progress. Appetite is moderately controlled. Reports no side effects of prescribed medications today. The patient is taking multivitamin and is not taking fish oil.  The patient is using a food journal.  The patient rates current efforts as 4 out of 10. She took a cruise so not surprised with weight gain. She is currently taking qsymia.     The patient is exercising with a FITT score of:    Frequency Intensity Time Strength Training   []   0, none []   0 []   0 [x]   0   [x]   1 (1-2x/week) [x]   1 (light) []   1 (<10 min) []   1 (1x/week)   []   2 (3-5x/week) []   2 (moderate) []   2 (10-20 min) []   2 (2x/week)   []   3 (daily) []   3 (moderately hard)  []   4 (very hard) [x]   3 (20-30 min)  []   4 (>30 min) []   3 (3-4x/week)     Review of Systems   Constitutional:  Negative for appetite change and fatigue.   Eyes:  Negative for visual disturbance.   Cardiovascular:  Negative for chest pain and palpitations.   Gastrointestinal:  Negative for constipation and indigestion.   Neurological:  Negative for light-headedness.   All other systems reviewed and are negative.      Objective   Start weight: 243.8lb pounds.    Total Loss lb/%Loss of beginning body weight (BBW): -13.3lb/-5.45%  Change in weight since last visit: +3.5    Recent Weight History:   Wt Readings from Last 6 Encounters:   25 105 kg (230 lb 8 oz)   25 103 kg (227 lb 6.4 oz)   25 103 kg (227 lb)   25 102 kg (225 lb)   25 103 kg (226 lb 6.4 oz)   25 102 kg (223 lb 12.8 oz)     Body mass index is 35.05 kg/m².   Body  "composition analysis completed and showed:   Body Fat %: 46.2    Measurements (in inches)  Waist Circumference: 43    Vital Signs:   /78 (BP Location: Left arm, Patient Position: Sitting)   Pulse 80   Ht 172.7 cm (68\")   Wt 105 kg (230 lb 8 oz)   BMI 35.05 kg/m²       Physical Exam  Vitals reviewed.   Constitutional:       Appearance: She is obese. She is not ill-appearing.   Pulmonary:      Effort: Pulmonary effort is normal.   Neurological:      Mental Status: She is alert.   Psychiatric:         Attention and Perception: Attention and perception normal.         Behavior: Behavior is cooperative.        Result Review :     Common labs          10/17/2024    09:38 12/23/2024    10:30 3/25/2025    09:45   Common Labs   Hemoglobin A1C 4.30  <4.30  4.60        Assessment / Plan        Diagnoses and all orders for this visit:    1. Obesity (BMI 30-39.9)  Assessment & Plan:  Patient's (Body mass index is 35.05 kg/m².) indicates that they are obese (BMI >30) with health conditions that include hypertension . Weight is improving with treatment. BMI  is above average; BMI management plan is completed. We discussed low calorie, low carb based diet program, portion control, increasing exercise, pharmacologic options including qsymia, and an joe-based approach such as Refinder by [employer] Pal or Lose It.     --This is a follow up and she is up 3.5lbs  --Goal to pack lunch daily (eating out 70% of meals).  --Goal to have no more than 1 soda per week  --Restart weighing    Orders:  -     Phentermine-Topiramate (Qsymia) 11.25-69 MG capsule sustained-release 24 hr; Take 11 mg by mouth Daily for 30 days.  Dispense: 30 capsule; Refill: 1    We discussed the risks, benefits, and limitations of treatments. Continue medications and OTC supplements as discussed. Patient verbalizes understanding of and agreement with management plan.     Follow Up   Return in about 4 weeks (around 5/26/2025).  Patient was given instructions and " counseling regarding her condition or for health maintenance advice. Please see specific information pulled into the AVS if appropriate.     I spent 40 minutes on this date of service. This time includes time spent by me in the following activities:preparing for the visit, counseling and educating the patient/family/caregiver, ordering medications, tests, or procedures and documenting information in the medical record.    Chuyita Johnson, APRN  04/28/2025

## 2025-04-28 NOTE — ASSESSMENT & PLAN NOTE
Patient's (Body mass index is 35.05 kg/m².) indicates that they are obese (BMI >30) with health conditions that include hypertension . Weight is improving with treatment. BMI  is above average; BMI management plan is completed. We discussed low calorie, low carb based diet program, portion control, increasing exercise, pharmacologic options including qsymia, and an joe-based approach such as [employer] Pal or Lose It.     --This is a follow up and she is up 3.5lbs  --Goal to pack lunch daily (eating out 70% of meals).  --Goal to have no more than 1 soda per week  --Restart weighing

## 2025-05-28 RX ORDER — PHENTERMINE AND TOPIRAMATE 11.25; 69 MG/1; MG/1
11 CAPSULE, EXTENDED RELEASE ORAL DAILY
Qty: 30 CAPSULE | Refills: 0 | Status: CANCELLED | OUTPATIENT
Start: 2025-05-28 | End: 2025-06-27

## 2025-05-29 NOTE — PROGRESS NOTES
[facility]  [address]  [place] [address]     Office Note      Date: 2025  Patient Name: Dianna Caicedo  MRN: [id number]  : 1985    Subjective     Chief Complaint  Obesity Management follow-up    Dianna Caicedo presents to [facility] for obesity management.     Patient is unsure with weight loss progress. Appetite is moderately controlled. Reports no side effects of prescribed medications today. The patient is taking multivitamin and is not taking fish oil.  The patient is not using a food journal.  The patient rates current efforts as 5 out of 10. She is not taking Qsymia daily and forgot to take daily.     The patient is exercising with a FITT score of:    Frequency Intensity Time Strength Training   []   0, none []   0 []   0 [x]   0   [x]   1 (1-2x/week) [x]   1 (light) []   1 (<10 min) []   1 (1x/week)   []   2 (3-5x/week) []   2 (moderate) []   2 (10-20 min) []   2 (2x/week)   []   3 (daily) []   3 (moderately hard)  []   4 (very hard) [x]   3 (20-30 min)  []   4 (>30 min) []   3 (3-4x/week)     Review of Systems   Constitutional:  Negative for fatigue.        Positive for weight gain   HENT:  Negative for trouble swallowing.         Negative for throat swelling   Respiratory:  Negative for shortness of breath and wheezing.         Negative for snoring   Cardiovascular:  Negative for chest pain, palpitations and leg swelling.   Gastrointestinal:  Negative for abdominal pain, constipation, diarrhea, GERD and indigestion.   Endocrine: Negative for cold intolerance, heat intolerance, polydipsia, polyphagia and polyuria.        Negative for loss of hair  Negative for hirsutism     Genitourinary:         Denies menstrual irregularities   Musculoskeletal:  Negative for arthralgias.        Denies exercise limitations  Denies chronic pain   Skin:  Negative for dry skin.        Negative for acne   Neurological:   "Negative for headache and memory problem.        Negative for paresthesias   Psychiatric/Behavioral:  Negative for self-injury, sleep disturbance, suicidal ideas and depressed mood. The patient is not nervous/anxious.      Objective   Start weight: 243.8 pounds.    Total Loss lb/%Loss of beginning body weight (BBW): -10.2 lb/-4.18%  Change in weight since last visit: +2.8    Recent Weight History:   Wt Readings from Last 6 Encounters:   06/02/25 107 kg (235 lb 12.8 oz)   06/02/25 106 kg (233 lb 9.6 oz)   04/28/25 105 kg (230 lb 8 oz)   03/25/25 103 kg (227 lb 6.4 oz)   03/25/25 103 kg (227 lb)   02/27/25 102 kg (225 lb)     Body mass index is 35.52 kg/m².   Body composition analysis completed and showed:   Body Fat %: 47.9%    Measurements (in inches)  Waist Circumference: 43    Vital Signs:   /84   Pulse 80   Resp 18   Ht 172.7 cm (68\")   Wt 106 kg (233 lb 9.6 oz)   SpO2 98%   BMI 35.52 kg/m²       Physical Exam  Vitals reviewed.   Constitutional:       Appearance: She is obese. She is not ill-appearing.   Pulmonary:      Effort: Pulmonary effort is normal.   Neurological:      Mental Status: She is alert.   Psychiatric:         Attention and Perception: Attention and perception normal.         Behavior: Behavior is cooperative.        Result Review :     Common labs          12/23/2024    10:30 3/25/2025    09:45 6/2/2025    11:23   Common Labs   Hemoglobin A1C <4.30  4.60  4.50          Assessment / Plan            Diagnoses and all orders for this visit:    1. Obesity (BMI 30-39.9) (Primary)  Assessment & Plan:  Patient's (Body mass index is 35.52 kg/m².) indicates that they are obese (BMI >30) with health conditions that include hypertension and dyslipidemias . Weight is improving with treatment. BMI  is above average; BMI management plan is completed. We discussed low calorie, low carb based diet program, portion control, increasing exercise, pharmacologic options including qsymia, and an joe-based " approach such as MyFitness Pal or Lose It.   --This is a follow up visit and she is up a little over 2 pounds since last being seen.  --Currently taking Qsymia but missing days.  Plans to restart this and take daily.  We did discuss that if weight does not decrease while on this medication we will likely need to discontinue in the near future.  --The past she has had a history of using a GLP-1 with decreased blood glucose levels.  A1c today in potentially restart at lowest dose if tolerated.  --She has a hernia repair 7/23.  --Goal of reducing soda is which she is still working on.  Will set a goal of cutting out sodas completely only allowing 1/week as a treat.  Currently having around 3 or 4 sodas per week.   --Message sent to staff to start PA for zepbound. She does not tolerate generic immediate release phentermine - side effects(heart palpations) Xenical (contraindicated- post sleeve, malabsorption) , contrave (on current SSRI)     Orders:  -     Hemoglobin A1c; Future  -     Vitamin D,25-Hydroxy; Future  -     TSH; Future    2. Encounter for long-term current use of medication  -     Hemoglobin A1c; Future    3. Vitamin D deficiency  -     Vitamin D,25-Hydroxy; Future        We discussed the risks, benefits, and limitations of treatments. Continue medications and OTC supplements as discussed. Patient verbalizes understanding of and agreement with management plan.     Follow Up   Return in about 4 weeks (around 6/30/2025).  Patient was given instructions and counseling regarding her condition or for health maintenance advice. Please see specific information pulled into the AVS if appropriate.     I spent 40 minutes on this date of service. This time includes time spent by me in the following activities:preparing for the visit, counseling and educating the patient/family/caregiver, ordering medications, tests, or procedures and documenting information in the medical record.        Chuyita Johnson,  APRN  06/02/2025

## 2025-06-02 ENCOUNTER — OFFICE VISIT (OUTPATIENT)
Dept: OBSTETRICS AND GYNECOLOGY | Facility: CLINIC | Age: 40
End: 2025-06-02
Payer: OTHER GOVERNMENT

## 2025-06-02 ENCOUNTER — TELEPHONE (OUTPATIENT)
Dept: FAMILY MEDICINE CLINIC | Facility: CLINIC | Age: 40
End: 2025-06-02
Payer: OTHER GOVERNMENT

## 2025-06-02 ENCOUNTER — LAB (OUTPATIENT)
Dept: LAB | Facility: HOSPITAL | Age: 40
End: 2025-06-02
Payer: OTHER GOVERNMENT

## 2025-06-02 ENCOUNTER — OFFICE VISIT (OUTPATIENT)
Dept: BARIATRICS/WEIGHT MGMT | Facility: CLINIC | Age: 40
End: 2025-06-02
Payer: OTHER GOVERNMENT

## 2025-06-02 VITALS
OXYGEN SATURATION: 98 % | HEART RATE: 80 BPM | RESPIRATION RATE: 18 BRPM | DIASTOLIC BLOOD PRESSURE: 84 MMHG | HEIGHT: 68 IN | SYSTOLIC BLOOD PRESSURE: 124 MMHG | WEIGHT: 233.6 LBS | BODY MASS INDEX: 35.4 KG/M2

## 2025-06-02 VITALS
HEIGHT: 68 IN | WEIGHT: 235.8 LBS | DIASTOLIC BLOOD PRESSURE: 88 MMHG | SYSTOLIC BLOOD PRESSURE: 142 MMHG | BODY MASS INDEX: 35.74 KG/M2

## 2025-06-02 DIAGNOSIS — Z01.419 ENCOUNTER FOR ANNUAL ROUTINE GYNECOLOGICAL EXAMINATION: Primary | ICD-10-CM

## 2025-06-02 DIAGNOSIS — R30.0 DYSURIA: ICD-10-CM

## 2025-06-02 DIAGNOSIS — Z79.899 ENCOUNTER FOR LONG-TERM CURRENT USE OF MEDICATION: ICD-10-CM

## 2025-06-02 DIAGNOSIS — N76.0 ACUTE VAGINITIS: ICD-10-CM

## 2025-06-02 DIAGNOSIS — E55.9 VITAMIN D DEFICIENCY: ICD-10-CM

## 2025-06-02 DIAGNOSIS — E66.9 OBESITY (BMI 30-39.9): Primary | ICD-10-CM

## 2025-06-02 DIAGNOSIS — E66.9 OBESITY (BMI 30-39.9): ICD-10-CM

## 2025-06-02 PROBLEM — N99.820 POSTOPERATIVE VAGINAL BLEEDING FOLLOWING GENITOURINARY PROCEDURE: Status: RESOLVED | Noted: 2024-03-26 | Resolved: 2025-06-02

## 2025-06-02 PROBLEM — N93.9 ABNORMAL UTERINE BLEEDING (AUB): Status: RESOLVED | Noted: 2023-12-15 | Resolved: 2025-06-02

## 2025-06-02 PROBLEM — N94.6 DYSMENORRHEA: Status: RESOLVED | Noted: 2023-12-15 | Resolved: 2025-06-02

## 2025-06-02 PROBLEM — N97.9 FEMALE INFERTILITY: Status: RESOLVED | Noted: 2022-04-26 | Resolved: 2025-06-02

## 2025-06-02 LAB
25(OH)D3 SERPL-MCNC: 23.6 NG/ML (ref 30–100)
BILIRUB BLD-MCNC: NEGATIVE MG/DL
CLARITY, POC: CLEAR
COLOR UR: YELLOW
GLUCOSE UR STRIP-MCNC: NEGATIVE MG/DL
HBA1C MFR BLD: 4.5 % (ref 4.8–5.6)
KETONES UR QL: NEGATIVE
LEUKOCYTE EST, POC: NEGATIVE
NITRITE UR-MCNC: NEGATIVE MG/ML
PH UR: 6 [PH] (ref 5–8)
PROT UR STRIP-MCNC: NEGATIVE MG/DL
RBC # UR STRIP: NEGATIVE /UL
SP GR UR: 1.01 (ref 1–1.03)
TSH SERPL DL<=0.05 MIU/L-ACNC: 3.44 UIU/ML (ref 0.27–4.2)
UROBILINOGEN UR QL: NORMAL

## 2025-06-02 PROCEDURE — 82306 VITAMIN D 25 HYDROXY: CPT

## 2025-06-02 PROCEDURE — 84443 ASSAY THYROID STIM HORMONE: CPT

## 2025-06-02 PROCEDURE — 36415 COLL VENOUS BLD VENIPUNCTURE: CPT

## 2025-06-02 PROCEDURE — 99215 OFFICE O/P EST HI 40 MIN: CPT | Performed by: NURSE PRACTITIONER

## 2025-06-02 PROCEDURE — 83036 HEMOGLOBIN GLYCOSYLATED A1C: CPT

## 2025-06-02 PROCEDURE — 96372 THER/PROPH/DIAG INJ SC/IM: CPT | Performed by: NURSE PRACTITIONER

## 2025-06-02 RX ORDER — FLUCONAZOLE 150 MG/1
150 TABLET ORAL DAILY
Qty: 2 TABLET | Refills: 0 | Status: SHIPPED | OUTPATIENT
Start: 2025-06-02

## 2025-06-02 RX ADMIN — CYANOCOBALAMIN 1000 MCG: 1000 INJECTION, SOLUTION INTRAMUSCULAR; SUBCUTANEOUS at 08:32

## 2025-06-02 NOTE — ASSESSMENT & PLAN NOTE
Patient's (Body mass index is 35.52 kg/m².) indicates that they are obese (BMI >30) with health conditions that include hypertension and dyslipidemias . Weight is improving with treatment. BMI  is above average; BMI management plan is completed. We discussed low calorie, low carb based diet program, portion control, increasing exercise, pharmacologic options including qsymia, and an joe-based approach such as [employer] Pal or Lose It.   --This is a follow up visit and she is up a little over 2 pounds since last being seen.  --Currently taking Qsymia but missing days.  Plans to restart this and take daily.  We did discuss that if weight does not decrease while on this medication we will likely need to discontinue in the near future.  --The past she has had a history of using a GLP-1 with decreased blood glucose levels.  A1c today in potentially restart at lowest dose if tolerated.  --She has a hernia repair 7/23.  --Goal of reducing soda is which she is still working on.  Will set a goal of cutting out sodas completely only allowing 1/week as a treat.  Currently having around 3 or 4 sodas per week.   --Message sent to staff to start PA for zepbound. She does not tolerate generic immediate release phentermine - side effects(heart palpations) Xenical (contraindicated- post sleeve, malabsorption) , contrave (on current SSRI)
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
## 2025-06-02 NOTE — TELEPHONE ENCOUNTER
I didn’t know until I was pre registering for my appointment today that my GYN appointment that’s scheduled for 9:30 didn’t have an updated referral. It’s my one year follow up appointment from my hysterectomy.      I also should check that my sleep appointment for tomorrow has an updated one. It’s my yearly follow up appointment, if it’s needed.      Please call me asap [phone]

## 2025-06-02 NOTE — TELEPHONE ENCOUNTER
Referral obtained and uploaded to chart for Dr. Hauser.    Auth/order #[id number]  Case categoryApproved

## 2025-06-03 ENCOUNTER — OFFICE VISIT (OUTPATIENT)
Dept: SLEEP MEDICINE | Facility: CLINIC | Age: 40
End: 2025-06-03
Payer: OTHER GOVERNMENT

## 2025-06-03 ENCOUNTER — PRIOR AUTHORIZATION (OUTPATIENT)
Dept: BARIATRICS/WEIGHT MGMT | Facility: CLINIC | Age: 40
End: 2025-06-03
Payer: OTHER GOVERNMENT

## 2025-06-03 VITALS
BODY MASS INDEX: 35.77 KG/M2 | SYSTOLIC BLOOD PRESSURE: 140 MMHG | TEMPERATURE: 98.2 F | OXYGEN SATURATION: 97 % | DIASTOLIC BLOOD PRESSURE: 90 MMHG | HEIGHT: 68 IN | WEIGHT: 236 LBS | HEART RATE: 97 BPM

## 2025-06-03 DIAGNOSIS — G47.33 OSA ON CPAP: Primary | ICD-10-CM

## 2025-06-03 PROCEDURE — 99213 OFFICE O/P EST LOW 20 MIN: CPT | Performed by: NURSE PRACTITIONER

## 2025-06-03 NOTE — TELEPHONE ENCOUNTER
Dianna Caicedo (Key: [id number])  Zepbound 2.5MG/0.5ML pen-injectors  Form  [employer] Electronic PA Form (2017 N[employer])

## 2025-06-03 NOTE — PROGRESS NOTES
Chief Complaint:   Chief Complaint   Patient presents with    Follow-up    Sleep Apnea       HPI:    Dianna Caicedo is a 40 y.o. female here for follow-up of sleep apnea.  Patient was last seen 3/26/2024.  Patient continues to do well with CPAP therapy.  Patient is sleeping 7 to 9 hours nightly.  Patient goes to sleep quickly and does get up x 1.  Patient has an [place] score of 2/24.  Patient is doing well without concern or complaint regarding CPAP and will continue therapy.        Current medications are:   Current Outpatient Medications:     buPROPion XL (Wellbutrin XL) 150 MG 24 hr tablet, Take 1 tablet by mouth Every Morning., Disp: 30 tablet, Rfl: 5    famotidine (Pepcid) 20 MG tablet, Take 1 tablet by mouth 2 (Two) Times a Day As Needed for Heartburn., Disp: 180 tablet, Rfl: 1    fluconazole (Diflucan) 150 MG tablet, Take 1 tablet by mouth Daily. Take once, then again 3 days later, Disp: 2 tablet, Rfl: 0    FLUoxetine (PROzac) 40 MG capsule, Take 1 capsule by mouth Daily., Disp: 90 capsule, Rfl: 2    nystatin (MYCOSTATIN) 952287 UNIT/GM powder, Apply  topically to the appropriate area as directed 3 (Three) Times a Day. (Patient taking differently: Apply  topically to the appropriate area as directed As Needed.), Disp: 60 g, Rfl: 1    omeprazole (priLOSEC) 40 MG capsule, Take 1 capsule by mouth Daily., Disp: 90 capsule, Rfl: 3    Sodium Fluoride 5000 PPM 1.1 % paste, USE ONCE DAILY IN PLACE OF REGULAR TOOTHPASTE, Disp: , Rfl:     tiZANidine (ZANAFLEX) 2 MG tablet, Take 1 tablet by mouth At Night As Needed for Muscle Spasms., Disp: 30 tablet, Rfl: 11    Current Facility-Administered Medications:     cyanocobalamin injection 1,000 mcg, 1,000 mcg, Intramuscular, Q28 Days, Chuyita Johnson APRN, 1,000 mcg at 06/02/25 0832.      The patient's relevant past medical, surgical, family and social history were reviewed and updated in Epic as appropriate.       Review of Systems   Gastrointestinal:          "Heartburn   Musculoskeletal:  Positive for arthralgias and myalgias.   Neurological:  Positive for headaches.   Psychiatric/Behavioral:  Positive for dysphoric mood and sleep disturbance. The patient is nervous/anxious.    All other systems reviewed and are negative.        Objective:    Physical Exam  Constitutional:       Appearance: Normal appearance.   HENT:      Head: Normocephalic and atraumatic.      Mouth/Throat:      Comments: Class 3 airway  Cardiovascular:      Rate and Rhythm: Normal rate and regular rhythm.   Skin:     General: Skin is warm and dry.   Neurological:      Mental Status: She is alert and oriented to person, place, and time.   Psychiatric:         Mood and Affect: Mood normal.         Behavior: Behavior normal.         Thought Content: Thought content normal.         Judgment: Judgment normal.     /90   Pulse 97   Temp 98.2 °F (36.8 °C)   Ht 172.7 cm (67.99\")   Wt 107 kg (236 lb)   LMP 03/02/2024 (Exact Date)   SpO2 97%   BMI 35.89 kg/m²       CPAP Report    90/90 days of use  Greater than 4-hour use 97.8  90% pressure 9.9  AHI of 3.3  Settings 6-20  The patient continues to use and benefit from CPAP therapy.    ASSESSMENT/PLAN    Diagnoses and all orders for this visit:    1. NGUYEN on CPAP (Primary)  -     PAP Therapy        Refill supplies x 1 year.  Return to clinic 1 year or sooner as symptoms warrant.        Signed by  AVINASH Harris    Bozena 3, 2025      CC: Charisse Meyer PA-C         No ref. provider found      "

## 2025-06-04 LAB
A VAGINAE DNA VAG QL NAA+PROBE: NORMAL SCORE
BVAB2 DNA VAG QL NAA+PROBE: NORMAL SCORE
C ALBICANS DNA VAG QL NAA+PROBE: NEGATIVE
C GLABRATA DNA VAG QL NAA+PROBE: NEGATIVE
C KRUSEI DNA VAG QL NAA+PROBE: NEGATIVE
C LUSITANIAE DNA VAG QL NAA+PROBE: NEGATIVE
CANDIDA DNA VAG QL NAA+PROBE: NEGATIVE
MEGA1 DNA VAG QL NAA+PROBE: NORMAL SCORE
T VAGINALIS DNA VAG QL NAA+PROBE: NEGATIVE

## 2025-06-04 NOTE — TELEPHONE ENCOUNTER
Dianna Caicedo (Key: [id number])  Zepbound 2.5MG/0.5ML pen-injectors  Form  [employer] Electronic PA Form (2017 NCPDP)        CaseId:[id number];Status:Approved;Review Type:Prior Auth;Coverage Start Date:05/04/2025;Coverage End Date:06/04/2026;. Authorization Expiration Date: June 4, 2026.

## 2025-06-05 LAB
BACTERIA UR CULT: NO GROWTH
BACTERIA UR CULT: NORMAL
SPECIMEN STATUS: NORMAL

## 2025-06-09 ENCOUNTER — RESULTS FOLLOW-UP (OUTPATIENT)
Dept: BARIATRICS/WEIGHT MGMT | Facility: CLINIC | Age: 40
End: 2025-06-09
Payer: OTHER GOVERNMENT

## 2025-06-09 DIAGNOSIS — E55.9 VITAMIN D DEFICIENCY: Primary | ICD-10-CM

## 2025-06-09 RX ORDER — ERGOCALCIFEROL 1.25 MG/1
50000 CAPSULE, LIQUID FILLED ORAL WEEKLY
Qty: 8 CAPSULE | Refills: 0 | Status: SHIPPED | OUTPATIENT
Start: 2025-06-09

## 2025-07-15 ENCOUNTER — OFFICE VISIT (OUTPATIENT)
Dept: BARIATRICS/WEIGHT MGMT | Facility: CLINIC | Age: 40
End: 2025-07-15
Payer: OTHER GOVERNMENT

## 2025-07-15 ENCOUNTER — PRE-ADMISSION TESTING (OUTPATIENT)
Dept: PREADMISSION TESTING | Facility: HOSPITAL | Age: 40
End: 2025-07-15
Payer: OTHER GOVERNMENT

## 2025-07-15 ENCOUNTER — OFFICE VISIT (OUTPATIENT)
Age: 40
End: 2025-07-15
Payer: OTHER GOVERNMENT

## 2025-07-15 VITALS
TEMPERATURE: 98.2 F | HEART RATE: 73 BPM | OXYGEN SATURATION: 98 % | SYSTOLIC BLOOD PRESSURE: 122 MMHG | DIASTOLIC BLOOD PRESSURE: 80 MMHG | BODY MASS INDEX: 36.71 KG/M2 | HEIGHT: 68 IN | WEIGHT: 242.2 LBS | RESPIRATION RATE: 18 BRPM

## 2025-07-15 VITALS
WEIGHT: 242.2 LBS | BODY MASS INDEX: 36.71 KG/M2 | HEIGHT: 68 IN | OXYGEN SATURATION: 96 % | SYSTOLIC BLOOD PRESSURE: 130 MMHG | HEART RATE: 80 BPM | DIASTOLIC BLOOD PRESSURE: 82 MMHG

## 2025-07-15 VITALS
HEART RATE: 77 BPM | HEIGHT: 68 IN | WEIGHT: 243 LBS | DIASTOLIC BLOOD PRESSURE: 74 MMHG | BODY MASS INDEX: 36.83 KG/M2 | SYSTOLIC BLOOD PRESSURE: 122 MMHG

## 2025-07-15 VITALS — WEIGHT: 244.16 LBS | BODY MASS INDEX: 37 KG/M2 | HEIGHT: 68 IN

## 2025-07-15 DIAGNOSIS — K21.9 HIATAL HERNIA WITH GERD: Primary | ICD-10-CM

## 2025-07-15 DIAGNOSIS — E66.9 OBESITY (BMI 30-39.9): Primary | ICD-10-CM

## 2025-07-15 DIAGNOSIS — K44.9 HIATAL HERNIA WITH GERD: ICD-10-CM

## 2025-07-15 DIAGNOSIS — K44.9 HIATAL HERNIA WITH GERD: Primary | ICD-10-CM

## 2025-07-15 DIAGNOSIS — E66.811 OBESITY, CLASS I, BMI 30-34.9: ICD-10-CM

## 2025-07-15 DIAGNOSIS — K21.9 HIATAL HERNIA WITH GERD: ICD-10-CM

## 2025-07-15 DIAGNOSIS — F33.1 MODERATE EPISODE OF RECURRENT MAJOR DEPRESSIVE DISORDER: ICD-10-CM

## 2025-07-15 DIAGNOSIS — Z98.84 S/P BARIATRIC SURGERY: ICD-10-CM

## 2025-07-15 DIAGNOSIS — F41.1 GENERALIZED ANXIETY DISORDER: Primary | ICD-10-CM

## 2025-07-15 LAB
DEPRECATED RDW RBC AUTO: 37.9 FL (ref 37–54)
ERYTHROCYTE [DISTWIDTH] IN BLOOD BY AUTOMATED COUNT: 12.1 % (ref 12.3–15.4)
HCT VFR BLD AUTO: 42.5 % (ref 34–46.6)
HGB BLD-MCNC: 14.6 G/DL (ref 12–15.9)
MCH RBC QN AUTO: 29.5 PG (ref 26.6–33)
MCHC RBC AUTO-ENTMCNC: 34.4 G/DL (ref 31.5–35.7)
MCV RBC AUTO: 85.9 FL (ref 79–97)
PLATELET # BLD AUTO: 219 10*3/MM3 (ref 140–450)
PMV BLD AUTO: 8.8 FL (ref 6–12)
RBC # BLD AUTO: 4.95 10*6/MM3 (ref 3.77–5.28)
WBC NRBC COR # BLD AUTO: 7.18 10*3/MM3 (ref 3.4–10.8)

## 2025-07-15 PROCEDURE — 85027 COMPLETE CBC AUTOMATED: CPT

## 2025-07-15 PROCEDURE — 87081 CULTURE SCREEN ONLY: CPT

## 2025-07-15 PROCEDURE — 36415 COLL VENOUS BLD VENIPUNCTURE: CPT

## 2025-07-15 PROCEDURE — 93005 ELECTROCARDIOGRAM TRACING: CPT

## 2025-07-15 RX ORDER — TIRZEPATIDE 2.5 MG/.5ML
2.5 INJECTION, SOLUTION SUBCUTANEOUS WEEKLY
Qty: 2 ML | Refills: 1 | Status: SHIPPED | OUTPATIENT
Start: 2025-07-15 | End: 2025-07-15

## 2025-07-15 RX ORDER — FLUOXETINE HYDROCHLORIDE 40 MG/1
40 CAPSULE ORAL DAILY
Qty: 90 CAPSULE | Refills: 2 | Status: SHIPPED | OUTPATIENT
Start: 2025-07-15

## 2025-07-15 RX ORDER — TIRZEPATIDE 2.5 MG/.5ML
2.5 INJECTION, SOLUTION SUBCUTANEOUS WEEKLY
Qty: 2 ML | Refills: 1 | Status: SHIPPED | OUTPATIENT
Start: 2025-07-15

## 2025-07-15 RX ORDER — BUPROPION HYDROCHLORIDE 300 MG/1
300 TABLET ORAL EVERY MORNING
Qty: 30 TABLET | Refills: 1 | Status: SHIPPED | OUTPATIENT
Start: 2025-07-15

## 2025-07-15 NOTE — PROGRESS NOTES
"      [facility]             Follow Up Office Visit      Patient Name: Dianna Caicedo  : 1985   MRN: [id number]     Referring Provider: Charisse Meyer PA-C    Chief Complaint:      ICD-10-CM ICD-9-CM   1. Generalized anxiety disorder  F41.1 300.02   2. Moderate episode of recurrent major depressive disorder  F33.1 296.32      History of Present Illness:   Dianna Caicedo is a 40 y.o. female   History of Present Illness    The patient presents for evaluation of anxiety and depression. She reports feeling more balanced in the past 4 to 5 weeks, attributing this improvement to the addition of Wellbutrin to her treatment regimen. However, she mentions occasionally forgetting to take her medication and describes an incident where taking it at night resulted in an inability to sleep due to racing thoughts, stating her brain was \"bouncing everywhere.\" She is currently on Prozac, which she believes is beneficial as it motivates her to get up and complete tasks. 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 This change is causing significant worry as she has been working since her early teens and is not accustomed to being out of work. She is interested in medication adjustments to further target anxiety and depression. We discussed options and will start with increase of Wellbutrin. She is agreeable with this. We will follow up in 1 mo to see how she has tolerated this.      Previous Medication Trials:  Zoloft (ineffective)     Subjective      Review of Systems:   Review of Systems   Constitutional:  Negative for chills, fatigue and fever.   HENT:  Negative for congestion, hearing loss, sore throat and trouble swallowing.    Eyes:  Negative for blurred vision and double vision.   Respiratory:  Negative for cough, chest tightness and shortness of breath.    Cardiovascular:  Negative for " chest pain and palpitations.   Gastrointestinal:  Negative for abdominal pain, constipation, diarrhea, nausea and vomiting.   Endocrine: Negative for polydipsia and polyuria.   Genitourinary:  Negative for hematuria and urgency.   Musculoskeletal:  Negative for arthralgias.   Skin:  Negative for skin lesions and bruise.   Neurological:  Negative for dizziness, tremors, seizures and light-headedness.   Hematological:  Negative for adenopathy.     Screening Scores:   PHQ-9 : 4  CRISTOFER-7 : 5    Medications:     Current Outpatient Medications:     buPROPion XL (Wellbutrin XL) 150 MG 24 hr tablet, Take 1 tablet by mouth Every Morning., Disp: 30 tablet, Rfl: 5    famotidine (Pepcid) 20 MG tablet, Take 1 tablet by mouth 2 (Two) Times a Day As Needed for Heartburn., Disp: 180 tablet, Rfl: 1    FLUoxetine (PROzac) 40 MG capsule, Take 1 capsule by mouth Daily., Disp: 90 capsule, Rfl: 2    nystatin (MYCOSTATIN) 768267 UNIT/GM powder, Apply  topically to the appropriate area as directed 3 (Three) Times a Day. (Patient taking differently: Apply  topically to the appropriate area as directed As Needed.), Disp: 60 g, Rfl: 1    omeprazole (priLOSEC) 40 MG capsule, Take 1 capsule by mouth Daily., Disp: 90 capsule, Rfl: 3    Sodium Fluoride 5000 PPM 1.1 % paste, USE ONCE DAILY IN PLACE OF REGULAR TOOTHPASTE, Disp: , Rfl:     tiZANidine (ZANAFLEX) 2 MG tablet, Take 1 tablet by mouth At Night As Needed for Muscle Spasms., Disp: 30 tablet, Rfl: 11    vitamin D (ERGOCALCIFEROL) 1.25 MG (90220 UT) capsule capsule, Take 1 capsule by mouth 1 (One) Time Per Week., Disp: 8 capsule, Rfl: 0    buPROPion XL (Wellbutrin XL) 300 MG 24 hr tablet, Take 1 tablet by mouth Every Morning., Disp: 30 tablet, Rfl: 1    Current Facility-Administered Medications:     cyanocobalamin injection 1,000 mcg, 1,000 mcg, Intramuscular, Q28 Days, Matcheswala, Chuyita, APRN, 1,000 mcg at 06/02/25 0832    Medication Considerations:  YON reviewed and appropriate.  "    Allergies:   Allergies   Allergen Reactions    Nifedipine Shortness Of Breath, Swelling and Rash     Patient reports general swelling, slow onset shortness of breath, rash around wrists and ankles.  Has not taken other Ca channel blockers that she is aware of.    Sulfa Antibiotics Swelling     Throat swells    Phentermine Palpitations     Dose not higher doses 37.5 related to heart palpitations.        Objective     Vital Signs:   Vitals:    07/15/25 1049   BP: 130/82   Pulse: 80   SpO2: 96%   Weight: 110 kg (242 lb 3.2 oz)   Height: 172.7 cm (67.99\")     Body mass index is 36.83 kg/m².     Mental Status Exam:   MENTAL STATUS EXAM   General Appearance:  Cleanly groomed and dressed  Eye Contact:  Good eye contact  Attitude:  Cooperative  Motor Activity:  Normal gait, posture  Muscle Strength:  Normal  Speech:  Normal rate, tone, volume  Language:  Spontaneous  Mood and affect:  Normal, pleasant and appropriate  Hopelessness:  Denies  Thought Process:  Logical and goal-directed  Associations/ Thought Content:  No delusions  Hallucinations:  None  Suicidal Ideations:  Not present  Homicidal Ideation:  Not present  Sensorium:  Alert  Orientation:  Person, place, time and situation  Immediate Recall, Recent, and Remote Memory:  Intact  Attention Span/ Concentration:  Good  Fund of Knowledge:  Appropriate for age and educational level  Intellectual Functioning:  Average range  Insight:  Fair  Judgement:  Fair  Reliability:  Fair  Impulse Control:  Fair      SUICIDE RISK ASSESSMENT/CSSRS:  1. Does patient have thoughts of suicide? denies  2. Does patient have intent for suicide? denies  3. Does patient have a current plan for suicide? denies  4. History of suicide attempts: denies  5. Family history of suicide or attempts: denies  6. History of violent behaviors towards others or property or thoughts of committing suicide: denies  7. History of sexual aggression toward others: denies  8. Access to firearms or weapons: " denies    Assessment / Plan      Visit Diagnosis/Orders Placed This Visit:  Diagnoses and all orders for this visit:  Assessment & Plan  Problems:  - Anxiety  - Depression    Content of Therapy:  The session focused on the patient's increasing stress due to her 's worsening condition and her transition to becoming a full-time caregiver. The patient expressed concerns about the significant lifestyle change, as she has always worked full-time. Medication adherence issues were discussed, and strategies such as setting alarms and using a pill box were suggested. The patient reported feeling more balanced with the addition of Wellbutrin but occasionally forgets to take her medications. The potential benefits of increasing either Prozac or Wellbutrin were explored, with a decision to increase Wellbutrin to address motivation and depressive symptoms.    Clinical Impression:  The patient appears to be experiencing increased stress related to her 's health and her upcoming transition to full-time caregiving. Despite these stressors, she reports feeling more balanced with the addition of Wellbutrin over the past 4-5 weeks. However, she occasionally forgets to take her medications, which may impact her overall progress. The patient has shown a positive response to Prozac for anxiety management and Wellbutrin for depressive symptoms. There is no indication of increased irritability from Wellbutrin at this time.    Therapeutic Intervention:  - Discussed medication adherence strategies, including setting alarms and using a pill box.  - Explored the potential benefits of increasing Prozac versus Wellbutrin.  - Decided to increase Wellbutrin to address motivation and depressive symptoms.    Plan:  - Increase Wellbutrin dosage.  - Continue Prozac as prescribed.  - Set an alarm and use a pill box to maintain a consistent medication routine.  - Contact the office if increased irritability or other side effects  occur.    Follow-up:  - Follow-up appointment scheduled in 1 month to reassess medication efficacy and side effects.    Notes & Risk Factors:  - Upcoming hernia repair surgery next week.  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  - No current risk factors for harm to self or others noted.     1. Generalized anxiety disorder  2. Moderate episode of recurrent major depressive disorder  - Continue Prozac 40 mg po daily  - Increase Wellbutrin XL to 300 mg po daily  - Prefers to hold off on therapy at this time  - Follow up with writer in 1 mo  Labs Reviewed : labs to 10/7/24 reviewed  EKG Reviewed   UDS Reviewed   Chart Reviewed      Functional Status: Mild impairment      Prognosis: Fair with Ongoing Treatment     Impression/Formulation:  Patient appeared alert and oriented. Patient is receptive to assistance with maintaining a stable lifestyle.  Patient presents with history of     ICD-10-CM ICD-9-CM   1. Generalized anxiety disorder  F41.1 300.02   2. Moderate episode of recurrent major depressive disorder  F33.1 296.32     Treatment Plan:     Patient will continue supportive psychotherapy efforts and medications as indicated. Clinic will obtain release of information for current treatment team for continuity of care as needed. Patient will contact this office, call 911 or present to the nearest emergency room should suicidal or homicidal ideations occur.  Discussed medication options and treatment plan of prescribed medication(s) as well as the risks, benefits, and potential side effects. Patient ackowledged and verbally consented to continue with current treatment plan and was educated on the importance of compliance with treatment and follow-up appointments.     Quality Measures:  Tobacco: Dianna Friedman Sascha  reports that she has never smoked. She has never been exposed to tobacco smoke. She has never used smokeless tobacco. I have educated her on the risk of diseases from using tobacco  products such as cancer, COPD, and heart disease.       Depression (PHQ >11): Patient screened positive for depression based on a PHQ-9 score of 4 on 7/15/2025. Follow-up recommendations include: follow up with writer in 1 mo, continue medications as prescribed.     Follow Up:   Return in about 1 month (around 8/15/2025) for Medication Management.    Short-term goals: Patient will adhere to medication regimen and experience continued improvement in symptoms over the next 3 months.   Long-term goals: Patient will adhere to medication treatment plan and report improvement in symptoms over the next 6 months    Sara Mckeon MD  [facility] [address]     This is electronically signed by Sara Mckeon MD     Patient or patient representative verbalized consent for the use of Ambient Listening during the visit with  Sara Mckeon MD for chart documentation. 7/15/2025  10:48 EDT
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
## 2025-07-15 NOTE — ASSESSMENT & PLAN NOTE
Patient's (Body mass index is 36.95 kg/m².) indicates that they are obese (BMI >30) with health conditions that include obstructive sleep apnea . Weight is improving with treatment. BMI  is above average; BMI management plan is completed. We discussed low calorie, low carb based diet program, portion control, increasing exercise, pharmacologic options including zepbound, and an joe-based approach such as [facility] Pal or Lose It.

## 2025-07-15 NOTE — PAT
An arrival time for procedure was not provided during PAT visit. If patient had any questions or concerns about their arrival time, they were instructed to contact their surgeon/physician.  Additionally, if the patient referred to an arrival time that was acquired from their my chart account, patient was encouraged to verify that time with their surgeon/physician. Arrival times are NOT provided in Pre Admission Testing Department.    Patient denies any current skin issues.     Patient to apply Chlorhexadine wipes  to surgical area (as instructed) the night before procedure and the AM of procedure. Wipes provided.    Patient viewed general PAT education video as instructed in their preoperative information received from their surgeon.  Patient stated the general PAT education video was viewed in its entirety and survey completed.  Copies of PAT general education handouts (Incentive Spirometry, Meds to Beds Program, Patient Belongings, Pre-op skin preparation instructions, Blood Glucose testing, Visitor policy, Surgery FAQ, Code H) distributed to patient if not printed. Education related to the PAT pass and skin preparation for surgery (if applicable) completed in PAT as a reinforcement to PAT education video. Patient instructed to return PAT pass provided today as well as completed skin preparation sheet (if applicable) on the day of procedure.     Additionally if patient had not viewed video yet but intended to view it at home or in our waiting area, then referred them to the handout with QR code/link provided during PAT visit.  Encouraged patient/family to read PAT general education handouts thoroughly and notify PAT staff with any questions or concerns. Patient verbalized understanding of all information and priority content.    Patient instructed to drink 20 ounces of Gatorade or Gatorlyte (if diabetic) and it needs to be completed 1 hour (for Main OR patients) or 2 hours (scheduled  section & [facility]  patients) before given arrival time for procedure (NO RED Gatorade and NO Gatorade Zero).    Patient verbalized understanding.    Post-Surgery Information Instruction Sheet given to patient during Pre-Admission Testing Visit with verbal instructions to patient to return with PAT PASS on the day of surgery. Additionally, encouraged patient to review the information provided.    PATIENT EKG ON CHART AND IN EPIC FROM 07/15/2025

## 2025-07-16 LAB
MRSA SPEC QL CULT: NORMAL
QT INTERVAL: 392 MS
QTC INTERVAL: 437 MS

## 2025-07-22 ENCOUNTER — ANESTHESIA EVENT (OUTPATIENT)
Dept: PERIOP | Facility: HOSPITAL | Age: 40
End: 2025-07-22
Payer: OTHER GOVERNMENT

## 2025-07-23 ENCOUNTER — ANESTHESIA EVENT CONVERTED (OUTPATIENT)
Dept: ANESTHESIOLOGY | Facility: HOSPITAL | Age: 40
End: 2025-07-23
Payer: OTHER GOVERNMENT

## 2025-07-23 ENCOUNTER — ANESTHESIA (OUTPATIENT)
Dept: PERIOP | Facility: HOSPITAL | Age: 40
End: 2025-07-23
Payer: OTHER GOVERNMENT

## 2025-07-23 ENCOUNTER — HOSPITAL ENCOUNTER (OUTPATIENT)
Facility: HOSPITAL | Age: 40
Setting detail: HOSPITAL OUTPATIENT SURGERY
Discharge: HOME | End: 2025-07-23
Attending: SURGERY | Admitting: SURGERY
Payer: OTHER GOVERNMENT

## 2025-07-23 VITALS
DIASTOLIC BLOOD PRESSURE: 102 MMHG | OXYGEN SATURATION: 92 % | SYSTOLIC BLOOD PRESSURE: 165 MMHG | TEMPERATURE: 98.4 F | RESPIRATION RATE: 16 BRPM | HEART RATE: 90 BPM

## 2025-07-23 DIAGNOSIS — K44.9 HIATAL HERNIA WITH GERD: ICD-10-CM

## 2025-07-23 DIAGNOSIS — K21.9 HIATAL HERNIA WITH GERD: ICD-10-CM

## 2025-07-23 PROCEDURE — 25010000002 BUPIVACAINE (PF) 0.25 % SOLUTION: Performed by: ANESTHESIOLOGY

## 2025-07-23 PROCEDURE — 25810000003 SODIUM CHLORIDE 0.9 % SOLUTION: Performed by: PHYSICIAN ASSISTANT

## 2025-07-23 PROCEDURE — 25010000002 FENTANYL CITRATE (PF) 50 MCG/ML SOLUTION

## 2025-07-23 PROCEDURE — 25010000002 SUCCINYLCHOLINE PER 20 MG: Performed by: ANESTHESIOLOGY

## 2025-07-23 PROCEDURE — 25010000002 DEXAMETHASONE PER 1 MG: Performed by: ANESTHESIOLOGY

## 2025-07-23 PROCEDURE — 25010000002 HYDRALAZINE PER 20 MG: Performed by: ANESTHESIOLOGY

## 2025-07-23 PROCEDURE — 25010000002 PROPOFOL 10 MG/ML EMULSION: Performed by: ANESTHESIOLOGY

## 2025-07-23 PROCEDURE — 25010000002 SUGAMMADEX 200 MG/2ML SOLUTION: Performed by: ANESTHESIOLOGY

## 2025-07-23 PROCEDURE — 25010000003 LABETALOL 5 MG/ML SOLUTION: Performed by: ANESTHESIOLOGY

## 2025-07-23 PROCEDURE — 25010000002 HYDROMORPHONE 1 MG/ML SOLUTION

## 2025-07-23 PROCEDURE — 25010000002 GLYCOPYRROLATE 1 MG/5ML SOLUTION: Performed by: ANESTHESIOLOGY

## 2025-07-23 PROCEDURE — 25010000002 LIDOCAINE PF 1% 1 % SOLUTION: Performed by: ANESTHESIOLOGY

## 2025-07-23 PROCEDURE — 25010000002 FENTANYL CITRATE (PF) 100 MCG/2ML SOLUTION: Performed by: ANESTHESIOLOGY

## 2025-07-23 PROCEDURE — 25010000002 CEFAZOLIN PER 500 MG: Performed by: PHYSICIAN ASSISTANT

## 2025-07-23 PROCEDURE — 25010000002 ONDANSETRON PER 1 MG: Performed by: ANESTHESIOLOGY

## 2025-07-23 PROCEDURE — 43280 LAPAROSCOPY FUNDOPLASTY: CPT | Performed by: SURGERY

## 2025-07-23 PROCEDURE — 25010000002 DEXAMETHASONE SODIUM PHOSPHATE 10 MG/ML SOLUTION: Performed by: ANESTHESIOLOGY

## 2025-07-23 DEVICE — FLOSEAL WITH RECOTHROM - 10ML.
Type: IMPLANTABLE DEVICE | Site: ABDOMEN | Status: FUNCTIONAL
Brand: FLOSEAL HEMOSTATIC MATRIX

## 2025-07-23 DEVICE — PBT NON ABSORBABLE WOUND CLOSURE DEVICE
Type: IMPLANTABLE DEVICE | Site: ABDOMEN | Status: FUNCTIONAL
Brand: V-LOC

## 2025-07-23 RX ORDER — FENTANYL CITRATE 50 UG/ML
50 INJECTION, SOLUTION INTRAMUSCULAR; INTRAVENOUS
Status: DISCONTINUED | OUTPATIENT
Start: 2025-07-23 | End: 2025-07-23 | Stop reason: HOSPADM

## 2025-07-23 RX ORDER — DEXAMETHASONE SODIUM PHOSPHATE 4 MG/ML
INJECTION, SOLUTION INTRA-ARTICULAR; INTRALESIONAL; INTRAMUSCULAR; INTRAVENOUS; SOFT TISSUE AS NEEDED
Status: DISCONTINUED | OUTPATIENT
Start: 2025-07-23 | End: 2025-07-23 | Stop reason: SURG

## 2025-07-23 RX ORDER — GLYCOPYRROLATE 0.2 MG/ML
INJECTION INTRAMUSCULAR; INTRAVENOUS AS NEEDED
Status: DISCONTINUED | OUTPATIENT
Start: 2025-07-23 | End: 2025-07-23 | Stop reason: SURG

## 2025-07-23 RX ORDER — SIMETHICONE 80 MG
TABLET,CHEWABLE ORAL
Status: COMPLETED
Start: 2025-07-23 | End: 2025-07-23

## 2025-07-23 RX ORDER — SODIUM CHLORIDE 0.9 % (FLUSH) 0.9 %
10 SYRINGE (ML) INJECTION EVERY 12 HOURS SCHEDULED
Status: DISCONTINUED | OUTPATIENT
Start: 2025-07-23 | End: 2025-07-23 | Stop reason: HOSPADM

## 2025-07-23 RX ORDER — ONDANSETRON 2 MG/ML
INJECTION INTRAMUSCULAR; INTRAVENOUS AS NEEDED
Status: DISCONTINUED | OUTPATIENT
Start: 2025-07-23 | End: 2025-07-23 | Stop reason: SURG

## 2025-07-23 RX ORDER — EPHEDRINE SULFATE 50 MG/ML
INJECTION INTRAVENOUS AS NEEDED
Status: DISCONTINUED | OUTPATIENT
Start: 2025-07-23 | End: 2025-07-23 | Stop reason: SURG

## 2025-07-23 RX ORDER — HYDROMORPHONE HYDROCHLORIDE 1 MG/ML
0.5 INJECTION, SOLUTION INTRAMUSCULAR; INTRAVENOUS; SUBCUTANEOUS
Status: DISCONTINUED | OUTPATIENT
Start: 2025-07-23 | End: 2025-07-23 | Stop reason: HOSPADM

## 2025-07-23 RX ORDER — SODIUM CHLORIDE 9 MG/ML
40 INJECTION, SOLUTION INTRAVENOUS AS NEEDED
Status: DISCONTINUED | OUTPATIENT
Start: 2025-07-23 | End: 2025-07-23 | Stop reason: HOSPADM

## 2025-07-23 RX ORDER — SODIUM CHLORIDE 0.9 % (FLUSH) 0.9 %
3-10 SYRINGE (ML) INJECTION AS NEEDED
Status: DISCONTINUED | OUTPATIENT
Start: 2025-07-23 | End: 2025-07-23 | Stop reason: HOSPADM

## 2025-07-23 RX ORDER — FAMOTIDINE 10 MG/ML
20 INJECTION, SOLUTION INTRAVENOUS ONCE
Status: DISCONTINUED | OUTPATIENT
Start: 2025-07-23 | End: 2025-07-23 | Stop reason: HOSPADM

## 2025-07-23 RX ORDER — LABETALOL HYDROCHLORIDE 5 MG/ML
INJECTION, SOLUTION INTRAVENOUS AS NEEDED
Status: DISCONTINUED | OUTPATIENT
Start: 2025-07-23 | End: 2025-07-23 | Stop reason: SURG

## 2025-07-23 RX ORDER — MIDAZOLAM HYDROCHLORIDE 1 MG/ML
1 INJECTION, SOLUTION INTRAMUSCULAR; INTRAVENOUS
Status: DISCONTINUED | OUTPATIENT
Start: 2025-07-23 | End: 2025-07-23 | Stop reason: HOSPADM

## 2025-07-23 RX ORDER — ROCURONIUM BROMIDE 10 MG/ML
INJECTION, SOLUTION INTRAVENOUS AS NEEDED
Status: DISCONTINUED | OUTPATIENT
Start: 2025-07-23 | End: 2025-07-23 | Stop reason: SURG

## 2025-07-23 RX ORDER — PROPOFOL 10 MG/ML
VIAL (ML) INTRAVENOUS AS NEEDED
Status: DISCONTINUED | OUTPATIENT
Start: 2025-07-23 | End: 2025-07-23 | Stop reason: SURG

## 2025-07-23 RX ORDER — LIDOCAINE HYDROCHLORIDE 10 MG/ML
INJECTION, SOLUTION EPIDURAL; INFILTRATION; INTRACAUDAL; PERINEURAL AS NEEDED
Status: DISCONTINUED | OUTPATIENT
Start: 2025-07-23 | End: 2025-07-23 | Stop reason: SURG

## 2025-07-23 RX ORDER — MAGNESIUM HYDROXIDE 1200 MG/15ML
LIQUID ORAL AS NEEDED
Status: DISCONTINUED | OUTPATIENT
Start: 2025-07-23 | End: 2025-07-23 | Stop reason: HOSPADM

## 2025-07-23 RX ORDER — BUPIVACAINE HYDROCHLORIDE 2.5 MG/ML
INJECTION, SOLUTION EPIDURAL; INFILTRATION; INTRACAUDAL; PERINEURAL
Status: COMPLETED | OUTPATIENT
Start: 2025-07-23 | End: 2025-07-23

## 2025-07-23 RX ORDER — ONDANSETRON 2 MG/ML
4 INJECTION INTRAMUSCULAR; INTRAVENOUS ONCE AS NEEDED
Status: DISCONTINUED | OUTPATIENT
Start: 2025-07-23 | End: 2025-07-23 | Stop reason: HOSPADM

## 2025-07-23 RX ORDER — SUCCINYLCHOLINE CHLORIDE 20 MG/ML
INJECTION INTRAMUSCULAR; INTRAVENOUS AS NEEDED
Status: DISCONTINUED | OUTPATIENT
Start: 2025-07-23 | End: 2025-07-23 | Stop reason: SURG

## 2025-07-23 RX ORDER — ENOXAPARIN SODIUM 100 MG/ML
40 INJECTION SUBCUTANEOUS
Status: DISCONTINUED | OUTPATIENT
Start: 2025-07-23 | End: 2025-07-23 | Stop reason: HOSPADM

## 2025-07-23 RX ORDER — FENTANYL CITRATE 50 UG/ML
INJECTION, SOLUTION INTRAMUSCULAR; INTRAVENOUS
Status: COMPLETED
Start: 2025-07-23 | End: 2025-07-23

## 2025-07-23 RX ORDER — SODIUM CHLORIDE 9 MG/ML
150 INJECTION, SOLUTION INTRAVENOUS CONTINUOUS
Status: DISCONTINUED | OUTPATIENT
Start: 2025-07-23 | End: 2025-07-23 | Stop reason: HOSPADM

## 2025-07-23 RX ORDER — SIMETHICONE 80 MG
80 TABLET,CHEWABLE ORAL ONCE
Status: COMPLETED | OUTPATIENT
Start: 2025-07-23 | End: 2025-07-23

## 2025-07-23 RX ORDER — LIDOCAINE HYDROCHLORIDE 10 MG/ML
0.5 INJECTION, SOLUTION EPIDURAL; INFILTRATION; INTRACAUDAL; PERINEURAL ONCE AS NEEDED
Status: DISCONTINUED | OUTPATIENT
Start: 2025-07-23 | End: 2025-07-23 | Stop reason: HOSPADM

## 2025-07-23 RX ORDER — FENTANYL CITRATE 50 UG/ML
INJECTION, SOLUTION INTRAMUSCULAR; INTRAVENOUS AS NEEDED
Status: DISCONTINUED | OUTPATIENT
Start: 2025-07-23 | End: 2025-07-23 | Stop reason: SURG

## 2025-07-23 RX ORDER — LABETALOL HYDROCHLORIDE 5 MG/ML
5 INJECTION, SOLUTION INTRAVENOUS
Status: DISCONTINUED | OUTPATIENT
Start: 2025-07-23 | End: 2025-07-23 | Stop reason: HOSPADM

## 2025-07-23 RX ORDER — HYDRALAZINE HYDROCHLORIDE 20 MG/ML
INJECTION INTRAMUSCULAR; INTRAVENOUS AS NEEDED
Status: DISCONTINUED | OUTPATIENT
Start: 2025-07-23 | End: 2025-07-23 | Stop reason: SURG

## 2025-07-23 RX ORDER — SODIUM CHLORIDE, SODIUM LACTATE, POTASSIUM CHLORIDE, CALCIUM CHLORIDE 600; 310; 30; 20 MG/100ML; MG/100ML; MG/100ML; MG/100ML
9 INJECTION, SOLUTION INTRAVENOUS CONTINUOUS
Status: DISCONTINUED | OUTPATIENT
Start: 2025-07-24 | End: 2025-07-23 | Stop reason: HOSPADM

## 2025-07-23 RX ORDER — DEXAMETHASONE SODIUM PHOSPHATE 10 MG/ML
INJECTION, SOLUTION INTRAMUSCULAR; INTRAVENOUS
Status: COMPLETED | OUTPATIENT
Start: 2025-07-23 | End: 2025-07-23

## 2025-07-23 RX ORDER — LABETALOL HYDROCHLORIDE 5 MG/ML
INJECTION, SOLUTION INTRAVENOUS
Status: COMPLETED
Start: 2025-07-23 | End: 2025-07-23

## 2025-07-23 RX ORDER — OXYCODONE HYDROCHLORIDE 5 MG/1
5 TABLET ORAL EVERY 4 HOURS PRN
Qty: 15 TABLET | Refills: 0 | Status: SHIPPED | OUTPATIENT
Start: 2025-07-23

## 2025-07-23 RX ORDER — SODIUM CHLORIDE 0.9 % (FLUSH) 0.9 %
3 SYRINGE (ML) INJECTION EVERY 12 HOURS SCHEDULED
Status: DISCONTINUED | OUTPATIENT
Start: 2025-07-23 | End: 2025-07-23 | Stop reason: HOSPADM

## 2025-07-23 RX ORDER — FAMOTIDINE 20 MG/1
20 TABLET, FILM COATED ORAL ONCE
Status: COMPLETED | OUTPATIENT
Start: 2025-07-23 | End: 2025-07-23

## 2025-07-23 RX ORDER — SODIUM CHLORIDE 0.9 % (FLUSH) 0.9 %
10 SYRINGE (ML) INJECTION AS NEEDED
Status: DISCONTINUED | OUTPATIENT
Start: 2025-07-23 | End: 2025-07-23 | Stop reason: HOSPADM

## 2025-07-23 RX ADMIN — FENTANYL CITRATE 50 MCG: 50 INJECTION, SOLUTION INTRAMUSCULAR; INTRAVENOUS at 08:03

## 2025-07-23 RX ADMIN — Medication 5 MG: at 09:54

## 2025-07-23 RX ADMIN — ONDANSETRON 4 MG: 2 INJECTION, SOLUTION INTRAMUSCULAR; INTRAVENOUS at 08:46

## 2025-07-23 RX ADMIN — HYDROMORPHONE HYDROCHLORIDE 0.5 MG: 1 INJECTION, SOLUTION INTRAMUSCULAR; INTRAVENOUS; SUBCUTANEOUS at 09:58

## 2025-07-23 RX ADMIN — SIMETHICONE 80 MG: 80 TABLET, CHEWABLE ORAL at 10:39

## 2025-07-23 RX ADMIN — HYDRALAZINE HYDROCHLORIDE 5 MG: 20 INJECTION INTRAMUSCULAR; INTRAVENOUS at 08:31

## 2025-07-23 RX ADMIN — Medication 80 MG: at 10:39

## 2025-07-23 RX ADMIN — FENTANYL CITRATE 50 MCG: 50 INJECTION, SOLUTION INTRAMUSCULAR; INTRAVENOUS at 09:26

## 2025-07-23 RX ADMIN — FAMOTIDINE 20 MG: 20 TABLET, FILM COATED ORAL at 06:36

## 2025-07-23 RX ADMIN — SODIUM CHLORIDE 500 ML: 9 INJECTION, SOLUTION INTRAVENOUS at 06:36

## 2025-07-23 RX ADMIN — TRANEXAMIC ACID 1000 MG: 100 INJECTION, SOLUTION INTRAVENOUS at 07:38

## 2025-07-23 RX ADMIN — PROPOFOL 25 MCG/KG/MIN: 10 INJECTION, EMULSION INTRAVENOUS at 07:37

## 2025-07-23 RX ADMIN — LIDOCAINE HYDROCHLORIDE 50 MG: 10 INJECTION, SOLUTION EPIDURAL; INFILTRATION; INTRACAUDAL at 07:33

## 2025-07-23 RX ADMIN — EPHEDRINE SULFATE 15 MG: 50 INJECTION INTRAVENOUS at 08:02

## 2025-07-23 RX ADMIN — HYDROMORPHONE HYDROCHLORIDE 0.5 MG: 1 INJECTION, SOLUTION INTRAMUSCULAR; INTRAVENOUS; SUBCUTANEOUS at 09:37

## 2025-07-23 RX ADMIN — Medication 10 MG: at 08:11

## 2025-07-23 RX ADMIN — Medication 10 MG: at 08:06

## 2025-07-23 RX ADMIN — ROCURONIUM BROMIDE 10 MG: 10 INJECTION INTRAVENOUS at 07:33

## 2025-07-23 RX ADMIN — SUGAMMADEX 200 MG: 100 INJECTION, SOLUTION INTRAVENOUS at 09:00

## 2025-07-23 RX ADMIN — LABETALOL HYDROCHLORIDE 5 MG: 5 INJECTION, SOLUTION INTRAVENOUS at 09:54

## 2025-07-23 RX ADMIN — BUPIVACAINE HYDROCHLORIDE 30 ML: 2.5 INJECTION, SOLUTION EPIDURAL; INFILTRATION; INTRACAUDAL; PERINEURAL at 07:36

## 2025-07-23 RX ADMIN — ROCURONIUM BROMIDE 40 MG: 10 INJECTION INTRAVENOUS at 07:48

## 2025-07-23 RX ADMIN — SUCCINYLCHOLINE CHLORIDE 170 MG: 20 INJECTION, SOLUTION INTRAMUSCULAR; INTRAVENOUS at 07:33

## 2025-07-23 RX ADMIN — SODIUM CHLORIDE: 9 INJECTION, SOLUTION INTRAVENOUS at 08:10

## 2025-07-23 RX ADMIN — PROPOFOL 200 MG: 10 INJECTION, EMULSION INTRAVENOUS at 07:33

## 2025-07-23 RX ADMIN — DEXAMETHASONE SODIUM PHOSPHATE 4 MG: 10 INJECTION INTRAMUSCULAR; INTRAVENOUS at 07:36

## 2025-07-23 RX ADMIN — GLYCOPYRROLATE 0.4 MCG: 0.2 INJECTION, SOLUTION INTRAMUSCULAR; INTRAVENOUS at 07:59

## 2025-07-23 RX ADMIN — SODIUM CHLORIDE 2000 MG: 900 INJECTION INTRAVENOUS at 07:41

## 2025-07-23 RX ADMIN — SODIUM CHLORIDE 150 ML/HR: 9 INJECTION, SOLUTION INTRAVENOUS at 06:54

## 2025-07-23 RX ADMIN — HYDRALAZINE HYDROCHLORIDE 5 MG: 20 INJECTION INTRAMUSCULAR; INTRAVENOUS at 08:34

## 2025-07-23 RX ADMIN — DEXAMETHASONE SODIUM PHOSPHATE 4 MG: 4 INJECTION, SOLUTION INTRA-ARTICULAR; INTRALESIONAL; INTRAMUSCULAR; INTRAVENOUS; SOFT TISSUE at 07:39

## 2025-07-23 RX ADMIN — FENTANYL CITRATE 50 MCG: 50 INJECTION, SOLUTION INTRAMUSCULAR; INTRAVENOUS at 07:33

## 2025-07-23 NOTE — OP NOTE
Preoperative Diagnosis:              Recurrent hiatal hernia and GE reflux disease status post laparoscopic sleeve gastrectomy, hiatal hernia repair, and cholecystectomy 10/13/2022                        Postoperative Diagnosis:                                Same     Procedure:                                                   Laparoscopic recurrent sliding hiatal hernia repair (not paraesophageal) with falciform ligament reinforcement                                                                                Surgeon:                                                       KRYS Mittal MD     Anesthesia:                                                   GETA     EBL:                                                              Min     Fluids:                                                           Crystalloid     Specimens:                                                   None     Drains:                                                           None     Counts:                                                          Correct     Complications:                                               None     Indications:   This is a 40-year-old morbidly obese female known to me status post uneventful laparoscopic sleeve gastrectomy, hiatal hernia repair and cholecystectomy on 10/13/2022.  Preoperatively and intraoperatively the hiatal hernia was small, Z-line was around 40 cm, the sleeve was done by Titan technique, crural repair posterior with a running nonabsorbable 2-0 V-loc suture.  She presented with recurrent severe reflux symptoms not well-controlled with maximal medical therapy which persisted after stopping her GLP-1 medications.  Upper GI in October 2024 showed mild reflux mild esophageal dysmotility small sliding hiatal hernia.  EGD in December 2024 showed recurrent hiatal hernia with linear erosive esophagitis, Z-line 37 cm no twisting or narrowing at the angularis, no bile in the stomach.  Distal  esophageal biopsies showed mild nonspecific changes, otherwise unremarkable.  I recommended she proceed with laparoscopic robotic assisted recurrent hiatal hernia repair and revision of her sleeve gastrectomy to Na-en-Y gastric bypass however she declined.  She was offered second opinion(s).  Ultimately she decided proceed with laparoscopic recurrent hiatal hernia repair with the understanding there is no guarantee this will alleviate her symptoms.  Please see our office notes.  Risks, benefits, and alternative therapies were discussed and she wishes to proceed with laparoscopic recurrent hiatal hernia repair and EGD     Operative technique:      The patient was brought to the operating room, and placed supine upon the operating room table.  SCD hose were placed, she underwent uneventful general endotracheal anesthesia per the anesthesiology staff, she received IV Ancef and subcutaneous Lovenox, the anesthesiology staff performed a TAP block, and the abdomen was prepped and draped with ChloraPrep in a sterile fashion, an Ioban was used as well, a Blackwell catheter was not placed.    The peritoneal cavity was entered in the upper abdomen to the left of midline using an 11 mm fascial splitting trocar utilizing an Optiview technique and the abdomen was insufflated to a pressure of 15 mmHg with CO2 gas.  Exploratory laparoscopy revealed no evidence of injury from the entrance technique, an enlarged smooth fatty appearing liver, no other abnormalities noted.    Remaining trocars were placed under direct visualization including an additional 11 mm fascial splitting trocar in the left mid abdomen and 5 mm trocars in the right and left lateral abdomen.    Through a stab incision in the epigastrium a Noreen retractor was placed and used to elevate the left lobe of the liver.  The sleeve appeared to be resting nicely, there were some adhesions around the hiatus without an obvious recurrent hiatal hernia noted.  Several  photos obtained during the procedure.    Omental adhesions to the lateral sleeve were taken down with the Enseal device.  The left ramos was exposed.  The recurrent hiatal hernia was not readily apparent.  Adhesions in the area of the previous pars flaccida were divided, there was not a replaced hepatic vessel.  The right ramos was exposed.  Once again the recurrent hiatal hernia was not readily apparent.  Previous posterior crural suture material appeared intact.  As expected a lot of scarring from previous dissection of the hiatus.  Some mild oozing, no uncontrolled bleeding, oozing was controlled with Floseal and Ray-Ramiro's as needed.    The hernia sac was incised along the base of the left ramos and this was extended up and across the phrenoesophageal membrane.  The hernia sac was incised along the base of the right ramos and this was extended up and across the phrenoesophageal membrane.    The hernia sac and it's contents were dissected out of the mediastinum and reduced below the level of the crura.    There was not a paraesophageal component.  Quite a bit of scar tissue encountered from previous repair and dissection proceeded to fresh areolar planes.  The GE junction was  lengthened to well below the level of the crura by dissecting well into the mediastinum.  A latex free drain was used temporarily for esophageal retraction.   The crura were dissected to their meeting point inferiorly.  Approximately 3 cm of intra-abdominal esophagus obtained.  The recurrent sliding hiatal hernia repair was then performed posteriorly using a running nonabsorbable 2-0 V-Loc suture to appropriate tension.     The falciform ligament was amputated at its base and mobilized up to the level of the liver and passed behind the esophagus in front of the crural repair and sutured as a sling over the angle of His using a running nonabsorbable 2-0 V-Loc suture.  A second running nonabsorbable 2-0 V-loc suture was used to suture the GE  junction medially to the falciform ligament medially.  Upon completion photodocumentation again obtained.  The sleeve was also noted to be resting nicely.        The Noreen retractor was removed.   Remaining trocars were removed under direct visualization, no bleeding noted from their sites.  Skin in each incision was closed using 3-0 Monocryl plus in an interrupted subcuticular stitch followed by skin glue.     The standard flexible endoscope was then advanced into the posterior pharynx and esophagus intubated.  The endoscope advanced easily through the esophagus, there were some retained secretions, some mild tertiary spasms, no distention.  No signs of eosinophilic esophagitis in the midesophagus however on reaching the distal esophagus there was chronic thickening of the distal esophageal mucosa and inflammation without erosions.  Also some carditis with edema.  No Amaro's esophagus or recurrent hiatal hernia.  Z-line roughly 39 cm.  The endoscope advanced through the hiatus without resistance into the gastric sleeve.  It was a nice uniform tube, no twisting or narrowing at the angularis.  A few particulate bile-stained secretions noted in the stomach.  No visible gastritis.  Pylorus not in spasm or deformed.  The endoscope was advanced into the duodenal bulb and bile suctioned free.  The endoscope was slowly withdrawn, no new abnormalities noted.  Endoscopic photos obtained of the GE junction angularis and pylorus.    The patient tolerated the procedure well without complication, was taken to the recovery room in stable condition.

## 2025-07-23 NOTE — BRIEF OP NOTE
HIATAL HERNIA REPAIR LAPAROSCOPIC, ESOPHAGOGASTRODUODENOSCOPY  Progress Note    Dianna Caicedo  7/23/2025    Pre-op Diagnosis:   Hiatal hernia with GERD [K44.9, K21.9]       Post-Op Diagnosis Codes:     * Hiatal hernia with GERD [K44.9, K21.9]     * S/P laparoscopic sleeve gastrectomy [Z98.84]    Procedure(s):      Procedure(s):  RECURRENT HIATAL HERNIA REPAIR LAPAROSCOPIC w/ possible mesh  ESOPHAGOGASTRODUODENOSCOPY              Surgeon(s):  Maurice Mittal MD    Anesthesia: General with Block    Staff:   Circulator: Eileen Clements RN  Scrub Person: Leni Crisostomo  Nursing Assistant: Rigoberto Matias PCT       Estimated Blood Loss: 25 mL    Urine Voided: * No values recorded between 7/23/2025  7:30 AM and 7/23/2025  9:00 AM *    Specimens:                None      Drains: * No LDAs found *    Findings:       Complications: None          Maurice Mittal MD     Date: 7/23/2025  Time: 09:04 EDT

## 2025-07-23 NOTE — NURSING NOTE
Caregiver Telephone Number    Phone Number for Ride/Caregiver: ANDERSON-[phone]     Who will be staying with you post procedure for the next 24 hours? Name and Phone Number?: TAEE-[phone]

## 2025-07-23 NOTE — ANESTHESIA PROCEDURE NOTES
"Peripheral Block      Patient reassessed immediately prior to procedure    Patient location during procedure: OR  Reason for block: at surgeon's request and post-op pain management  Performed byNA: Iram Petersen SRNA  Preanesthetic Checklist  Completed: patient identified, IV checked, site marked, risks and benefits discussed, surgical consent, monitors and equipment checked, pre-op evaluation and timeout performed  Prep:  Pt Position: supine  Sterile barriers:cap, gloves, mask and washed/disinfected hands  Prep: ChloraPrep  Patient monitoring: blood pressure monitoring, continuous pulse oximetry and EKG  Procedure    Sedation: yes  Performed under: general  Guidance:ultrasound guided  Images:still images obtained, printed/placed on chart    Laterality:Bilateral  Block Type:TAP  Injection Technique:single-shot  Needle Type:short-bevel and echogenic  Needle Gauge:20 G  Resistance on Injection: none    Medications Used: bupivacaine PF (MARCAINE) 0.25 % injection - Injection   30 mL - 7/23/2025 7:36:00 AM  dexamethasone sodium phosphate injection - Injection   4 mg - 7/23/2025 7:36:00 AM      Medications  Comment:Block Injection:  LA dose divided between Right and Left block        Post Assessment  Injection Assessment: negative aspiration for heme, incremental injection and no paresthesia on injection  Patient Tolerance:comfortable throughout block  Complications:no  Additional Notes    Subcostal TAPs    A high-frequency linear transducer, with sterile cover, was placed sub-xiphoid to identify Linea Alba, right and left Rectus Abdominus Muscles (KHANG). The transducer was moved either right or left subcostally to identify the KHANG and the Transverse Abdominus Muscle (ORTEGA). The insertion site was prepped in sterile fashion and then localized with 2-5 ml of 1% Lidocaine. Using ultrasound-guidance, a 20-gauge B-Torres 4\" Ultraplex 360 non-stimulating echogenic needle was advanced in plane, from medial to lateral, until the " tip of the needle was in the fascial plane between the KHANG and ORTEGA. 1-3ml of preservative free normal saline was used to hydro-dissect the fascial planes. After the fascial plane was verified, the local anesthetic (LA) was injected. The procedure was repeated on the opposite side for bilateral coverage. Aspiration every 5 ml to prevent intravascular injection. Injection was completed with negative aspiration of blood and negative intravascular injection. Injection pressures were normal with minimal resistance. The subcostal approach to the TAP nerve block ideally anesthetizes the intercostal nerves T6-T9.    Performed by: Jarrod Reid MD

## 2025-07-23 NOTE — ANESTHESIA POSTPROCEDURE EVALUATION
Patient: Dianna Caicedo    Procedure Summary       Date: 07/23/25 Room / Location:  [facility] /  [facility]    Anesthesia Start: 0730 Anesthesia Stop: 0915    Procedures:       RECURRENT HIATAL HERNIA REPAIR LAPAROSCOPIC w/ possible mesh (Abdomen)      ESOPHAGOGASTRODUODENOSCOPY (Esophagus) Diagnosis:       Hiatal hernia with GERD      S/P laparoscopic sleeve gastrectomy      (Hiatal hernia with GERD [K44.9, K21.9])    Surgeons: Maurice Mittal MD Provider: Jarrod Reid MD    Anesthesia Type: general with block ASA Status: 3            Anesthesia Type: general with block    Vitals  Vitals Value Taken Time   BP     Temp     Pulse     Resp     SpO2 92 % 07/23/25 09:15   Vitals shown include unfiled device data.        Post Anesthesia Care and Evaluation    Patient location during evaluation: PACU  Patient participation: complete - patient participated  Level of consciousness: awake and alert  Pain management: adequate    Airway patency: patent  Anesthetic complications: No anesthetic complications  PONV Status: none  Cardiovascular status: hemodynamically stable and acceptable  Respiratory status: nonlabored ventilation, acceptable and nasal cannula  Hydration status: acceptable

## 2025-07-23 NOTE — INTERVAL H&P NOTE
H&P updated. The patient was examined and the following changes are noted:  Mother with her in preop.  Plans outpt, discharge instructions discussed, has the post HHR diet.  Says has zofran at home.  MRSA screen negative, Hb 14.6

## 2025-07-23 NOTE — ANESTHESIA PROCEDURE NOTES
Airway  Reason: elective    Date/Time: 7/23/2025 7:35 AM  Airway not difficult    General Information and Staff    Patient location during procedure: OR  CRNA/CAA: Bunny Morley CRNA    Indications and Patient Condition  Indications for airway management: airway protection    Preoxygenated: yes  MILS not maintained throughout    Mask difficulty assessment: 0 - not attempted    Final Airway Details    Final airway type: endotracheal airway      Successful airway: ETT  Cuffed: yes   Successful intubation technique: video laryngoscopy  Endotracheal tube insertion site: oral  Blade: Sutton  Blade size: 3  ETT size (mm): 7.5  Cormack-Lehane Classification: grade I - full view of glottis  Placement verified by: chest auscultation and capnometry   Measured from: lips  ETT/EBT  to lips (cm): 22  Number of attempts at approach: 1  Assessment: lips, teeth, and gum same as pre-op and atraumatic intubation    Additional Comments  Negative epigastric sounds, Breath sound equal bilaterally with symmetric chest rise and fall

## 2025-07-23 NOTE — ANESTHESIA PREPROCEDURE EVALUATION
Anesthesia Evaluation     Patient summary reviewed, Nursing notes reviewed and pregnancy test completed   no history of anesthetic complications:   NPO Solid Status: > 8 hours  NPO Liquid Status: > 2 hours           Airway   Mallampati: I  TM distance: >3 FB  Neck ROM: full  No difficulty expected  Dental - normal exam     Pulmonary - normal exam   (+) ,sleep apnea on CPAP  (-) asthma, recent URI, not a smoker  Cardiovascular - normal exam  Exercise tolerance: good (4-7 METS)    ECG reviewed  PT is on anticoagulation therapy    (+) hypertension, DVT resolved, hyperlipidemia  (-) past MI, dysrhythmias, angina, CHF, cardiac stents    ROS comment: 2/2020-·Cardiac chamber sizes and wall thicknesses are normal.  · Global and segmental LV and RV systolic function is normal.  · There is insufficient tricuspid regurgitation to allow for the estimation of right ventricular systolic pressure.  · Michele's sign is absent.  · The aortic and mitral valves are structurally and functionally normal.  · No other important findings are noted on this study.        3/5/24- acceptable CV risk for surgery; no fruther ardiac testing necessary.    1/2008+tilt table test     Neuro/Psych  (+) headaches, syncope (no recurrence since dec), numbness, psychiatric history Anxiety  (-) seizures, CVA  GI/Hepatic/Renal/Endo    (+) obesity, hiatal hernia, GERD, thyroid problem hypothyroidism  (-) morbid obesity, no renal disease, diabetes    Musculoskeletal     Abdominal    Substance History - negative use     OB/GYN    (-)  Pregnant, Preeclampsia and history of pregnancy induced hypertension        Other   arthritis,     ROS/Med Hx Other: DVT in pregnancy no chronic Rx now   +Gerd, none currently          Phys Exam Other: McG3 G1 V 2024                  Anesthesia Plan    ASA 3     general with block     (TAPs )  intravenous induction     Anesthetic plan, risks, benefits, and alternatives have been provided, discussed and informed consent has  been obtained with: patient.    Plan discussed with CRNA.      CODE STATUS:

## 2025-07-28 ENCOUNTER — TELEPHONE (OUTPATIENT)
Dept: BARIATRICS/WEIGHT MGMT | Facility: CLINIC | Age: 40
End: 2025-07-28
Payer: OTHER GOVERNMENT

## 2025-07-28 NOTE — TELEPHONE ENCOUNTER
Pt called, stated that for the last 24-36 hours she has had severe lower back pain that radiates to her flank areas. She stated that she cannot take a deep breath, has to take small shallow breaths or the pain becomes unbearable. She stated that she cannot get comfortable no matter the position.     She has take the Oxycodone sporadically over the weekend, with her last dose being last night at 9:30 pm. She stated that she also took Tizanadine yesterday evening at 6 pm and last dose at 2 am this morning. She stated that she has also taken Tylenol this morning at 11:30 am. She has gotten no relief in her pain with the medication. She has been alternating ice and heat. She also went to see a Chiropractor for an adjustment that helped some but not enough to make her comfortable.     She denied any other symptom or issue like abd pain, n/v, fever/chills. Please advise, thanks!

## 2025-07-28 NOTE — TELEPHONE ENCOUNTER
Called pt, advised that she is welcome to treat her back pain however she would pre-surgery with medications such as NSAIDs or Steroids. I advised if her symptoms worsen/persist or she has difficulty breathing then she should seek emergency medical treatment. Pt understood with no further questions or concerns.

## 2025-08-06 ENCOUNTER — OFFICE VISIT (OUTPATIENT)
Dept: BARIATRICS/WEIGHT MGMT | Facility: CLINIC | Age: 40
End: 2025-08-06
Payer: OTHER GOVERNMENT

## 2025-08-06 VITALS
TEMPERATURE: 98.6 F | WEIGHT: 231.2 LBS | SYSTOLIC BLOOD PRESSURE: 124 MMHG | OXYGEN SATURATION: 100 % | BODY MASS INDEX: 35.04 KG/M2 | HEART RATE: 97 BPM | RESPIRATION RATE: 16 BRPM | HEIGHT: 68 IN | DIASTOLIC BLOOD PRESSURE: 72 MMHG

## 2025-08-06 DIAGNOSIS — Z48.89 POSTOPERATIVE VISIT: Primary | ICD-10-CM

## 2025-08-06 PROCEDURE — 99024 POSTOP FOLLOW-UP VISIT: CPT | Performed by: PHYSICIAN ASSISTANT

## 2025-08-11 ENCOUNTER — OFFICE VISIT (OUTPATIENT)
Age: 40
End: 2025-08-11
Payer: OTHER GOVERNMENT

## 2025-08-11 ENCOUNTER — OFFICE VISIT (OUTPATIENT)
Dept: BARIATRICS/WEIGHT MGMT | Facility: CLINIC | Age: 40
End: 2025-08-11
Payer: OTHER GOVERNMENT

## 2025-08-11 VITALS
DIASTOLIC BLOOD PRESSURE: 84 MMHG | BODY MASS INDEX: 35.31 KG/M2 | HEIGHT: 68 IN | OXYGEN SATURATION: 98 % | HEART RATE: 60 BPM | WEIGHT: 233 LBS | SYSTOLIC BLOOD PRESSURE: 136 MMHG

## 2025-08-11 VITALS
SYSTOLIC BLOOD PRESSURE: 124 MMHG | HEIGHT: 68 IN | DIASTOLIC BLOOD PRESSURE: 86 MMHG | BODY MASS INDEX: 35.31 KG/M2 | WEIGHT: 233 LBS | HEART RATE: 72 BPM

## 2025-08-11 DIAGNOSIS — K91.89 POSTOPERATIVE SURGICAL COMPLICATION INVOLVING DIGESTIVE SYSTEM ASSOCIATED WITH DIGESTIVE SYSTEM PROCEDURE, UNSPECIFIED COMPLICATION: ICD-10-CM

## 2025-08-11 DIAGNOSIS — F41.1 GENERALIZED ANXIETY DISORDER: Primary | ICD-10-CM

## 2025-08-11 DIAGNOSIS — F33.1 MODERATE EPISODE OF RECURRENT MAJOR DEPRESSIVE DISORDER: ICD-10-CM

## 2025-08-11 DIAGNOSIS — E66.9 OBESITY (BMI 30-39.9): Primary | ICD-10-CM

## 2025-08-11 PROCEDURE — 99214 OFFICE O/P EST MOD 30 MIN: CPT | Performed by: STUDENT IN AN ORGANIZED HEALTH CARE EDUCATION/TRAINING PROGRAM

## 2025-08-11 PROCEDURE — 96127 BRIEF EMOTIONAL/BEHAV ASSMT: CPT | Performed by: STUDENT IN AN ORGANIZED HEALTH CARE EDUCATION/TRAINING PROGRAM

## 2025-08-11 PROCEDURE — 99215 OFFICE O/P EST HI 40 MIN: CPT | Performed by: NURSE PRACTITIONER

## 2025-08-27 ENCOUNTER — OFFICE VISIT (OUTPATIENT)
Dept: FAMILY MEDICINE CLINIC | Facility: CLINIC | Age: 40
End: 2025-08-27
Payer: OTHER GOVERNMENT

## 2025-08-27 VITALS
HEART RATE: 70 BPM | SYSTOLIC BLOOD PRESSURE: 126 MMHG | BODY MASS INDEX: 35.31 KG/M2 | HEIGHT: 68 IN | WEIGHT: 233 LBS | DIASTOLIC BLOOD PRESSURE: 78 MMHG | TEMPERATURE: 97.9 F | OXYGEN SATURATION: 99 %

## 2025-08-27 DIAGNOSIS — R05.9 COUGH, UNSPECIFIED TYPE: ICD-10-CM

## 2025-08-27 DIAGNOSIS — J40 BRONCHITIS: Primary | ICD-10-CM

## 2025-08-27 DIAGNOSIS — R52 BODY ACHES: ICD-10-CM

## 2025-08-27 LAB
EXPIRATION DATE: NORMAL
FLUAV AG UPPER RESP QL IA.RAPID: NOT DETECTED
FLUBV AG UPPER RESP QL IA.RAPID: NOT DETECTED
INTERNAL CONTROL: NORMAL
Lab: NORMAL
SARS-COV-2 AG UPPER RESP QL IA.RAPID: NOT DETECTED

## 2025-08-27 RX ORDER — AZITHROMYCIN 250 MG/1
TABLET, FILM COATED ORAL
Qty: 6 TABLET | Refills: 0 | Status: SHIPPED | OUTPATIENT
Start: 2025-08-27

## 2025-08-27 RX ORDER — PREDNISONE 20 MG/1
20 TABLET ORAL 2 TIMES DAILY
Qty: 10 TABLET | Refills: 0 | Status: SHIPPED | OUTPATIENT
Start: 2025-08-27

## 2025-08-28 ENCOUNTER — TELEPHONE (OUTPATIENT)
Dept: FAMILY MEDICINE CLINIC | Facility: CLINIC | Age: 40
End: 2025-08-28
Payer: OTHER GOVERNMENT

## (undated) DEVICE — TROCAR: Brand: KII FIOS FIRST ENTRY

## (undated) DEVICE — ENDOPATH XCEL BLADELESS TROCARS WITH STABILITY SLEEVES: Brand: ENDOPATH XCEL

## (undated) DEVICE — ENSEAL LAPAROSCOPIC TISSUE SEALER G2 ARTICULATING CURVED JAW FOR USE WITH G2 GENERATOR 5MM DIAMETER 45CM SHAFT LENGTH: Brand: ENSEAL

## (undated) DEVICE — TRENDELENBURG WINGPAD POSITIONING KIT DELUXE - WITHOUT BODY STRAP: Brand: SOULE MEDICAL

## (undated) DEVICE — LAPAROVUE VISIBILITY SYSTEM LAPAROSCOPIC SOLUTIONS: Brand: LAPAROVUE

## (undated) DEVICE — Device

## (undated) DEVICE — PK BARIATRIC 10

## (undated) DEVICE — APPL COTN TP PLSTC 6IN STRL LF PK/2

## (undated) DEVICE — OCCL COLPO PNEUMO  STRL

## (undated) DEVICE — PATIENT RETURN ELECTRODE, SINGLE-USE, CONTACT QUALITY MONITORING, ADULT, WITH 9FT CORD, FOR PATIENTS WEIGING OVER 33LBS. (15KG): Brand: MEGADYNE

## (undated) DEVICE — BLANKT WARM UPPR/BDY ARM/OUT 57X196CM

## (undated) DEVICE — APL DUPLOSPRAYER MIS 40CM

## (undated) DEVICE — ANTIBACTERIAL VIOLET BRAIDED (POLYGLACTIN 910), SYNTHETIC ABSORBABLE SUTURE: Brand: COATED VICRYL

## (undated) DEVICE — GLV SURG SENSICARE PI MIC PF SZ9 LF STRL

## (undated) DEVICE — SCRB SURG BACTOSHIELD CHG 4PCT 4OZ

## (undated) DEVICE — APPL CHLORAPREP TINTED 26ML TEAL

## (undated) DEVICE — ARM DRAPE

## (undated) DEVICE — SEAL

## (undated) DEVICE — GLV SURG SENSICARE PI MIC PF SZ8 LF STRL

## (undated) DEVICE — GLV SURG SENSICARE PI MIC PF SZ8.5 LF STRL

## (undated) DEVICE — APPL ENDO FOR/FLOSEAL/DEL 1P/U LF STRL

## (undated) DEVICE — LAPAROSCOPIC SMOKE FILTRATION SYSTEM: Brand: PALL LAPAROSHIELD® PLUS LAPAROSCOPIC SMOKE FILTRATION SYSTEM

## (undated) DEVICE — PENROSE DRAIN 18 X .5" SILICONE: Brand: MEDLINE

## (undated) DEVICE — Device: Brand: STANDARD BOUGIE, 38FR

## (undated) DEVICE — ANTIBACTERIAL UNDYED BRAIDED (POLYGLACTIN 910), SYNTHETIC ABSORBABLE SUTURE: Brand: COATED VICRYL

## (undated) DEVICE — SHT AIR TRANSFR COMFRT GLIDE LAT 40X80IN

## (undated) DEVICE — GLV SURG SENSICARE SLT PF LF 9 STRL

## (undated) DEVICE — SNAP KOVER: Brand: UNBRANDED

## (undated) DEVICE — BLADELESS OBTURATOR: Brand: WECK VISTA

## (undated) DEVICE — TROC BLADLES ANCHORPORT/OPTI LP 8X120MM 1P/U

## (undated) DEVICE — GOWN,NON-REINFORCED,SIRUS,SET IN SLV,XXL: Brand: MEDLINE

## (undated) DEVICE — SYR CONTRL LUERLOK 10CC

## (undated) DEVICE — INTENDED USE FOR SURGICAL MARKING ON INTACT SKIN, ALSO PROVIDES A PERMANENT METHOD OF IDENTIFYING OBJECTS IN THE OPERATING ROOM: Brand: WRITESITE® REGULAR TIP SKIN MARKER

## (undated) DEVICE — ST TBG CONN PNEUMOCLEAR EVAC SMOKE HEAT/HUMID

## (undated) DEVICE — TIP COVER ACCESSORY

## (undated) DEVICE — COLUMN DRAPE

## (undated) DEVICE — SYR LUERLOK 30CC

## (undated) DEVICE — ADHS SKIN PREMIERPRO EXOFIN TOPICAL HI/VISC .5ML

## (undated) DEVICE — PK MAJ GYN DAVINCI 10

## (undated) DEVICE — ENDOPATH 5MM ENDOSCOPIC BLUNT TIP DISSECTORS (12 POUCHES CONTAINING 3 DISSECTORS EACH): Brand: ENDOPATH

## (undated) DEVICE — [HIGH FLOW INSUFFLATOR,  DO NOT USE IF PACKAGE IS DAMAGED,  KEEP DRY,  KEEP AWAY FROM SUNLIGHT,  PROTECT FROM HEAT AND RADIOACTIVE SOURCES.]: Brand: PNEUMOSURE

## (undated) DEVICE — MANIP UTER ELEVATOR/PRO W/OCCLUDORBALLOON/BALN 35MM MD STRL

## (undated) DEVICE — ENDOPATH PNEUMONEEDLE INSUFFLATION NEEDLES WITH LUER LOCK CONNECTORS 120MM: Brand: ENDOPATH

## (undated) DEVICE — GOWN,SIRUS,NONRNF,SETINSLV,2XL,18/CS: Brand: MEDLINE

## (undated) DEVICE — MICRO HVTSA, 0.5G AND HVTSA SOURCEMARK PRODUCT CODE M1206 AND M1206-01: Brand: EXOFIN MICRO HVTSA, 0.5G

## (undated) DEVICE — SYNCHROSEAL: Brand: DA VINCI ENERGY

## (undated) DEVICE — Device: Brand: DEFENDO AIR/WATER/SUCTION AND BIOPSY VALVE

## (undated) DEVICE — CONN TBG Y 5 IN 1 LF STRL

## (undated) DEVICE — SUT MONOCRYL PLS ANTIB UND 3/0  PS1 27IN

## (undated) DEVICE — TROC STANDARDTROCAR FOR/TITANSGS 19MM DISP STRL

## (undated) DEVICE — CONTN GRAD MEAS TRIANG 32OZ BLK

## (undated) DEVICE — ENDOPATH XCEL UNIVERSAL TROCAR STABLILITY SLEEVES: Brand: ENDOPATH XCEL

## (undated) DEVICE — UNDRPD COMFRT GLD DRYPAD 36X57IN